# Patient Record
Sex: FEMALE | Race: WHITE | NOT HISPANIC OR LATINO | Employment: OTHER | URBAN - METROPOLITAN AREA
[De-identification: names, ages, dates, MRNs, and addresses within clinical notes are randomized per-mention and may not be internally consistent; named-entity substitution may affect disease eponyms.]

---

## 2017-02-23 ENCOUNTER — HOSPITAL ENCOUNTER (OUTPATIENT)
Dept: RADIOLOGY | Facility: MEDICAL CENTER | Age: 67
End: 2017-02-23
Attending: NEUROLOGICAL SURGERY
Payer: MEDICARE

## 2017-02-23 ENCOUNTER — HOSPITAL ENCOUNTER (OUTPATIENT)
Dept: RADIOLOGY | Facility: MEDICAL CENTER | Age: 67
DRG: 460 | End: 2017-02-23
Attending: NEUROLOGICAL SURGERY
Payer: MEDICARE

## 2017-02-23 DIAGNOSIS — M48.061 SPINAL STENOSIS, LUMBAR REGION, WITHOUT NEUROGENIC CLAUDICATION: ICD-10-CM

## 2017-02-23 DIAGNOSIS — Z01.812 PRE-OPERATIVE LABORATORY EXAMINATION: ICD-10-CM

## 2017-02-23 LAB
25(OH)D3 SERPL-MCNC: 29 NG/ML (ref 30–100)
ANION GAP SERPL CALC-SCNC: 10 MMOL/L (ref 0–11.9)
APPEARANCE UR: CLEAR
APTT PPP: 31.6 SEC (ref 24.7–36)
BACTERIA #/AREA URNS HPF: ABNORMAL /HPF
BASOPHILS # BLD AUTO: 0.5 % (ref 0–1.8)
BASOPHILS # BLD: 0.02 K/UL (ref 0–0.12)
BILIRUB UR QL STRIP.AUTO: NEGATIVE
BUN SERPL-MCNC: 25 MG/DL (ref 8–22)
CALCIUM SERPL-MCNC: 10.8 MG/DL (ref 8.5–10.5)
CHLORIDE SERPL-SCNC: 104 MMOL/L (ref 96–112)
CO2 SERPL-SCNC: 24 MMOL/L (ref 20–33)
COLOR UR: ABNORMAL
CREAT SERPL-MCNC: 0.93 MG/DL (ref 0.5–1.4)
CULTURE IF INDICATED INDCX: YES UA CULTURE
EOSINOPHIL # BLD AUTO: 0.1 K/UL (ref 0–0.51)
EOSINOPHIL NFR BLD: 2.4 % (ref 0–6.9)
ERYTHROCYTE [DISTWIDTH] IN BLOOD BY AUTOMATED COUNT: 41.8 FL (ref 35.9–50)
GFR SERPL CREATININE-BSD FRML MDRD: >60 ML/MIN/1.73 M 2
GLUCOSE SERPL-MCNC: 94 MG/DL (ref 65–99)
GLUCOSE UR STRIP.AUTO-MCNC: NEGATIVE MG/DL
HCT VFR BLD AUTO: 38.8 % (ref 37–47)
HGB BLD-MCNC: 13.2 G/DL (ref 12–16)
IMM GRANULOCYTES # BLD AUTO: 0.01 K/UL (ref 0–0.11)
IMM GRANULOCYTES NFR BLD AUTO: 0.2 % (ref 0–0.9)
INR PPP: 1.11 (ref 0.87–1.13)
KETONES UR STRIP.AUTO-MCNC: NEGATIVE MG/DL
LEUKOCYTE ESTERASE UR QL STRIP.AUTO: ABNORMAL
LYMPHOCYTES # BLD AUTO: 1.29 K/UL (ref 1–4.8)
LYMPHOCYTES NFR BLD: 30.7 % (ref 22–41)
MCH RBC QN AUTO: 31.3 PG (ref 27–33)
MCHC RBC AUTO-ENTMCNC: 34 G/DL (ref 33.6–35)
MCV RBC AUTO: 91.9 FL (ref 81.4–97.8)
MICRO URNS: ABNORMAL
MONOCYTES # BLD AUTO: 0.19 K/UL (ref 0–0.85)
MONOCYTES NFR BLD AUTO: 4.5 % (ref 0–13.4)
MUCOUS THREADS #/AREA URNS HPF: ABNORMAL /HPF
NEUTROPHILS # BLD AUTO: 2.59 K/UL (ref 2–7.15)
NEUTROPHILS NFR BLD: 61.7 % (ref 44–72)
NITRITE UR QL STRIP.AUTO: NEGATIVE
NRBC # BLD AUTO: 0 K/UL
NRBC BLD AUTO-RTO: 0 /100 WBC
PH UR STRIP.AUTO: 7 [PH]
PLATELET # BLD AUTO: 101 K/UL (ref 164–446)
PMV BLD AUTO: 9.9 FL (ref 9–12.9)
POTASSIUM SERPL-SCNC: 3.9 MMOL/L (ref 3.6–5.5)
PROT UR QL STRIP: NEGATIVE MG/DL
PROTHROMBIN TIME: 14.7 SEC (ref 12–14.6)
RBC # BLD AUTO: 4.22 M/UL (ref 4.2–5.4)
RBC # URNS HPF: ABNORMAL /HPF
RBC UR QL AUTO: NEGATIVE
SODIUM SERPL-SCNC: 138 MMOL/L (ref 135–145)
SP GR UR STRIP.AUTO: 1.02
WBC # BLD AUTO: 4.2 K/UL (ref 4.8–10.8)
WBC #/AREA URNS HPF: ABNORMAL /HPF

## 2017-02-23 PROCEDURE — 72110 X-RAY EXAM L-2 SPINE 4/>VWS: CPT

## 2017-02-23 PROCEDURE — 81001 URINALYSIS AUTO W/SCOPE: CPT

## 2017-02-23 PROCEDURE — 82306 VITAMIN D 25 HYDROXY: CPT

## 2017-02-23 PROCEDURE — 85610 PROTHROMBIN TIME: CPT

## 2017-02-23 PROCEDURE — 87086 URINE CULTURE/COLONY COUNT: CPT

## 2017-02-23 PROCEDURE — 87077 CULTURE AEROBIC IDENTIFY: CPT

## 2017-02-23 PROCEDURE — 87186 SC STD MICRODIL/AGAR DIL: CPT

## 2017-02-23 PROCEDURE — 71010 DX-CHEST-LIMITED (1 VIEW): CPT

## 2017-02-23 PROCEDURE — 85730 THROMBOPLASTIN TIME PARTIAL: CPT

## 2017-02-23 PROCEDURE — 36415 COLL VENOUS BLD VENIPUNCTURE: CPT

## 2017-02-23 PROCEDURE — 85025 COMPLETE CBC W/AUTO DIFF WBC: CPT

## 2017-02-23 PROCEDURE — 80048 BASIC METABOLIC PNL TOTAL CA: CPT

## 2017-02-23 RX ORDER — FLUOXETINE 10 MG/1
10 CAPSULE ORAL DAILY
Status: ON HOLD | COMMUNITY
End: 2019-12-19

## 2017-02-23 RX ORDER — NICOTINE POLACRILEX 4 MG/1
1 GUM, CHEWING ORAL DAILY
Status: ON HOLD | COMMUNITY
End: 2019-12-05

## 2017-02-23 RX ORDER — ALLOPURINOL 100 MG/1
100 TABLET ORAL DAILY
Status: ON HOLD | COMMUNITY
End: 2019-12-19

## 2017-02-24 LAB — EKG IMPRESSION: NORMAL

## 2017-02-25 LAB
BACTERIA UR CULT: ABNORMAL
BACTERIA UR CULT: ABNORMAL
SIGNIFICANT IND 70042: ABNORMAL
SITE SITE: ABNORMAL
SOURCE SOURCE: ABNORMAL

## 2017-03-08 ENCOUNTER — HOSPITAL ENCOUNTER (INPATIENT)
Facility: MEDICAL CENTER | Age: 67
LOS: 3 days | DRG: 460 | End: 2017-03-11
Attending: NEUROLOGICAL SURGERY | Admitting: NEUROLOGICAL SURGERY
Payer: MEDICARE

## 2017-03-08 ENCOUNTER — APPOINTMENT (OUTPATIENT)
Dept: RADIOLOGY | Facility: MEDICAL CENTER | Age: 67
DRG: 460 | End: 2017-03-08
Attending: NEUROLOGICAL SURGERY
Payer: MEDICARE

## 2017-03-08 PROBLEM — M48.061 SPINAL STENOSIS, LUMBAR REGION, WITHOUT NEUROGENIC CLAUDICATION: Status: ACTIVE | Noted: 2017-03-08

## 2017-03-08 LAB
ABO GROUP BLD: NORMAL
ABO GROUP BLD: NORMAL
BLD GP AB SCN SERPL QL: NORMAL
ERYTHROCYTE [DISTWIDTH] IN BLOOD BY AUTOMATED COUNT: 42.2 FL (ref 35.9–50)
GLUCOSE BLD-MCNC: 115 MG/DL (ref 65–99)
GLUCOSE BLD-MCNC: 117 MG/DL (ref 65–99)
HCT VFR BLD AUTO: 36.6 % (ref 37–47)
HGB BLD-MCNC: 12.3 G/DL (ref 12–16)
MCH RBC QN AUTO: 30.9 PG (ref 27–33)
MCHC RBC AUTO-ENTMCNC: 33.6 G/DL (ref 33.6–35)
MCV RBC AUTO: 92 FL (ref 81.4–97.8)
PLATELET # BLD AUTO: 90 K/UL (ref 164–446)
PMV BLD AUTO: 10.1 FL (ref 9–12.9)
RBC # BLD AUTO: 3.98 M/UL (ref 4.2–5.4)
RH BLD: NORMAL
WBC # BLD AUTO: 3.8 K/UL (ref 4.8–10.8)

## 2017-03-08 PROCEDURE — 500819 HCHG MARKERS, PASSIVE REFLECTIVE: Performed by: NEUROLOGICAL SURGERY

## 2017-03-08 PROCEDURE — 700111 HCHG RX REV CODE 636 W/ 250 OVERRIDE (IP): Performed by: PHYSICIAN ASSISTANT

## 2017-03-08 PROCEDURE — 501838 HCHG SUTURE GENERAL: Performed by: NEUROLOGICAL SURGERY

## 2017-03-08 PROCEDURE — C1713 ANCHOR/SCREW BN/BN,TIS/BN: HCPCS | Performed by: NEUROLOGICAL SURGERY

## 2017-03-08 PROCEDURE — A9270 NON-COVERED ITEM OR SERVICE: HCPCS | Performed by: PHYSICIAN ASSISTANT

## 2017-03-08 PROCEDURE — 700102 HCHG RX REV CODE 250 W/ 637 OVERRIDE(OP)

## 2017-03-08 PROCEDURE — 770001 HCHG ROOM/CARE - MED/SURG/GYN PRIV*

## 2017-03-08 PROCEDURE — 86850 RBC ANTIBODY SCREEN: CPT

## 2017-03-08 PROCEDURE — 503195 HCHG SEALER, BIPOLAR AQUAMANTYS: Performed by: NEUROLOGICAL SURGERY

## 2017-03-08 PROCEDURE — 700111 HCHG RX REV CODE 636 W/ 250 OVERRIDE (IP)

## 2017-03-08 PROCEDURE — 01NB0ZZ RELEASE LUMBAR NERVE, OPEN APPROACH: ICD-10-PCS | Performed by: NEUROLOGICAL SURGERY

## 2017-03-08 PROCEDURE — 86901 BLOOD TYPING SEROLOGIC RH(D): CPT

## 2017-03-08 PROCEDURE — A9270 NON-COVERED ITEM OR SERVICE: HCPCS

## 2017-03-08 PROCEDURE — 160035 HCHG PACU - 1ST 60 MINS PHASE I: Performed by: NEUROLOGICAL SURGERY

## 2017-03-08 PROCEDURE — 82962 GLUCOSE BLOOD TEST: CPT

## 2017-03-08 PROCEDURE — 500105 HCHG BONE MILL BM200: Performed by: NEUROLOGICAL SURGERY

## 2017-03-08 PROCEDURE — 502240 HCHG MISC OR SUPPLY RC 0272: Performed by: NEUROLOGICAL SURGERY

## 2017-03-08 PROCEDURE — 700101 HCHG RX REV CODE 250

## 2017-03-08 PROCEDURE — 700102 HCHG RX REV CODE 250 W/ 637 OVERRIDE(OP): Performed by: PHYSICIAN ASSISTANT

## 2017-03-08 PROCEDURE — 86900 BLOOD TYPING SEROLOGIC ABO: CPT

## 2017-03-08 PROCEDURE — 160002 HCHG RECOVERY MINUTES (STAT): Performed by: NEUROLOGICAL SURGERY

## 2017-03-08 PROCEDURE — A4606 OXYGEN PROBE USED W OXIMETER: HCPCS | Performed by: NEUROLOGICAL SURGERY

## 2017-03-08 PROCEDURE — A4314 CATH W/DRAINAGE 2-WAY LATEX: HCPCS | Performed by: NEUROLOGICAL SURGERY

## 2017-03-08 PROCEDURE — 160041 HCHG SURGERY MINUTES - EA ADDL 1 MIN LEVEL 4: Performed by: NEUROLOGICAL SURGERY

## 2017-03-08 PROCEDURE — 160009 HCHG ANES TIME/MIN: Performed by: NEUROLOGICAL SURGERY

## 2017-03-08 PROCEDURE — 160029 HCHG SURGERY MINUTES - 1ST 30 MINS LEVEL 4: Performed by: NEUROLOGICAL SURGERY

## 2017-03-08 PROCEDURE — 160048 HCHG OR STATISTICAL LEVEL 1-5: Performed by: NEUROLOGICAL SURGERY

## 2017-03-08 PROCEDURE — 500367 HCHG DRAIN KIT, HEMOVAC: Performed by: NEUROLOGICAL SURGERY

## 2017-03-08 PROCEDURE — 110371 HCHG SHELL REV 272: Performed by: NEUROLOGICAL SURGERY

## 2017-03-08 PROCEDURE — 700111 HCHG RX REV CODE 636 W/ 250 OVERRIDE (IP): Performed by: NEUROLOGICAL SURGERY

## 2017-03-08 PROCEDURE — 500002 HCHG ADHESIVE, DERMABOND: Performed by: NEUROLOGICAL SURGERY

## 2017-03-08 PROCEDURE — A6222 GAUZE <=16 IN NO W/SAL W/O B: HCPCS | Performed by: NEUROLOGICAL SURGERY

## 2017-03-08 PROCEDURE — 72100 X-RAY EXAM L-S SPINE 2/3 VWS: CPT

## 2017-03-08 PROCEDURE — 0QH004Z INSERTION OF INTERNAL FIXATION DEVICE INTO LUMBAR VERTEBRA, OPEN APPROACH: ICD-10-PCS | Performed by: NEUROLOGICAL SURGERY

## 2017-03-08 PROCEDURE — 0SG00A1 FUSION OF LUMBAR VERTEBRAL JOINT WITH INTERBODY FUSION DEVICE, POSTERIOR APPROACH, POSTERIOR COLUMN, OPEN APPROACH: ICD-10-PCS | Performed by: NEUROLOGICAL SURGERY

## 2017-03-08 PROCEDURE — 160036 HCHG PACU - EA ADDL 30 MINS PHASE I: Performed by: NEUROLOGICAL SURGERY

## 2017-03-08 PROCEDURE — 502626 HCHG SURGIFLO HEMOSTATIC MATRIX 6ML: Performed by: NEUROLOGICAL SURGERY

## 2017-03-08 PROCEDURE — 502708 HCHG CATHETER, ON-Q SILVER SKR: Performed by: NEUROLOGICAL SURGERY

## 2017-03-08 PROCEDURE — 95940 IONM IN OPERATNG ROOM 15 MIN: CPT | Performed by: NEUROLOGICAL SURGERY

## 2017-03-08 PROCEDURE — 502000 HCHG MISC OR IMPLANTS RC 0278: Performed by: NEUROLOGICAL SURGERY

## 2017-03-08 PROCEDURE — 700112 HCHG RX REV CODE 229: Performed by: PHYSICIAN ASSISTANT

## 2017-03-08 PROCEDURE — 110382 HCHG SHELL REV 271: Performed by: NEUROLOGICAL SURGERY

## 2017-03-08 PROCEDURE — 85027 COMPLETE CBC AUTOMATED: CPT

## 2017-03-08 DEVICE — DBM CHUNKY PROGENIX PLS 10.CC: Type: IMPLANTABLE DEVICE | Status: FUNCTIONAL

## 2017-03-08 DEVICE — IMPLANTABLE DEVICE: Type: IMPLANTABLE DEVICE | Status: FUNCTIONAL

## 2017-03-08 RX ORDER — CEFAZOLIN SODIUM 2 G/100ML
2 INJECTION, SOLUTION INTRAVENOUS EVERY 8 HOURS
Status: COMPLETED | OUTPATIENT
Start: 2017-03-08 | End: 2017-03-09

## 2017-03-08 RX ORDER — ONDANSETRON 4 MG/1
4 TABLET, ORALLY DISINTEGRATING ORAL EVERY 4 HOURS PRN
Status: DISCONTINUED | OUTPATIENT
Start: 2017-03-08 | End: 2017-03-11 | Stop reason: HOSPADM

## 2017-03-08 RX ORDER — SODIUM CHLORIDE AND POTASSIUM CHLORIDE 150; 900 MG/100ML; MG/100ML
INJECTION, SOLUTION INTRAVENOUS CONTINUOUS
Status: DISCONTINUED | OUTPATIENT
Start: 2017-03-08 | End: 2017-03-11 | Stop reason: HOSPADM

## 2017-03-08 RX ORDER — METHOCARBAMOL 750 MG/1
750 TABLET, FILM COATED ORAL EVERY 8 HOURS PRN
Status: DISCONTINUED | OUTPATIENT
Start: 2017-03-08 | End: 2017-03-11 | Stop reason: HOSPADM

## 2017-03-08 RX ORDER — OXYCODONE HCL 5 MG/5 ML
SOLUTION, ORAL ORAL
Status: COMPLETED
Start: 2017-03-08 | End: 2017-03-08

## 2017-03-08 RX ORDER — ROPIVACAINE HYDROCHLORIDE 2 MG/ML
INJECTION, SOLUTION EPIDURAL; INFILTRATION; PERINEURAL
Status: DISCONTINUED | OUTPATIENT
Start: 2017-03-08 | End: 2017-03-08 | Stop reason: HOSPADM

## 2017-03-08 RX ORDER — LISINOPRIL 20 MG/1
20 TABLET ORAL DAILY
Status: DISCONTINUED | OUTPATIENT
Start: 2017-03-08 | End: 2017-03-11 | Stop reason: HOSPADM

## 2017-03-08 RX ORDER — BISACODYL 10 MG
10 SUPPOSITORY, RECTAL RECTAL
Status: DISCONTINUED | OUTPATIENT
Start: 2017-03-08 | End: 2017-03-11 | Stop reason: HOSPADM

## 2017-03-08 RX ORDER — HYDROCHLOROTHIAZIDE 25 MG/1
50 TABLET ORAL DAILY
Status: DISCONTINUED | OUTPATIENT
Start: 2017-03-08 | End: 2017-03-11 | Stop reason: HOSPADM

## 2017-03-08 RX ORDER — DEXAMETHASONE SODIUM PHOSPHATE 4 MG/ML
4 INJECTION, SOLUTION INTRA-ARTICULAR; INTRALESIONAL; INTRAMUSCULAR; INTRAVENOUS; SOFT TISSUE EVERY 6 HOURS
Status: COMPLETED | OUTPATIENT
Start: 2017-03-08 | End: 2017-03-09

## 2017-03-08 RX ORDER — DIPHENHYDRAMINE HCL 25 MG
25 TABLET ORAL EVERY 6 HOURS PRN
Status: DISCONTINUED | OUTPATIENT
Start: 2017-03-08 | End: 2017-03-11 | Stop reason: HOSPADM

## 2017-03-08 RX ORDER — DOCUSATE SODIUM 100 MG/1
100 CAPSULE, LIQUID FILLED ORAL 2 TIMES DAILY
Status: DISCONTINUED | OUTPATIENT
Start: 2017-03-08 | End: 2017-03-11 | Stop reason: HOSPADM

## 2017-03-08 RX ORDER — SODIUM CHLORIDE, SODIUM LACTATE, POTASSIUM CHLORIDE, CALCIUM CHLORIDE 600; 310; 30; 20 MG/100ML; MG/100ML; MG/100ML; MG/100ML
1000 INJECTION, SOLUTION INTRAVENOUS
Status: COMPLETED | OUTPATIENT
Start: 2017-03-08 | End: 2017-03-08

## 2017-03-08 RX ORDER — GLIMEPIRIDE 2 MG/1
1 TABLET ORAL EVERY MORNING
Status: DISCONTINUED | OUTPATIENT
Start: 2017-03-08 | End: 2017-03-11 | Stop reason: HOSPADM

## 2017-03-08 RX ORDER — VANCOMYCIN HCL 900 MCG/MG
POWDER (GRAM) MISCELLANEOUS
Status: DISCONTINUED | OUTPATIENT
Start: 2017-03-08 | End: 2017-03-08 | Stop reason: HOSPADM

## 2017-03-08 RX ORDER — ZOLPIDEM TARTRATE 5 MG/1
5 TABLET ORAL
Status: DISCONTINUED | OUTPATIENT
Start: 2017-03-09 | End: 2017-03-11 | Stop reason: HOSPADM

## 2017-03-08 RX ORDER — MAGNESIUM HYDROXIDE 1200 MG/15ML
LIQUID ORAL
Status: DISCONTINUED | OUTPATIENT
Start: 2017-03-08 | End: 2017-03-08 | Stop reason: HOSPADM

## 2017-03-08 RX ORDER — ATORVASTATIN CALCIUM 20 MG/1
20 TABLET, FILM COATED ORAL DAILY
COMMUNITY

## 2017-03-08 RX ORDER — LIDOCAINE HYDROCHLORIDE 10 MG/ML
INJECTION, SOLUTION INFILTRATION; PERINEURAL
Status: COMPLETED
Start: 2017-03-08 | End: 2017-03-08

## 2017-03-08 RX ORDER — ACETAMINOPHEN 500 MG
TABLET ORAL
Status: DISPENSED
Start: 2017-03-08 | End: 2017-03-09

## 2017-03-08 RX ORDER — ONDANSETRON 2 MG/ML
4 INJECTION INTRAMUSCULAR; INTRAVENOUS EVERY 4 HOURS PRN
Status: DISCONTINUED | OUTPATIENT
Start: 2017-03-08 | End: 2017-03-11 | Stop reason: HOSPADM

## 2017-03-08 RX ORDER — CELECOXIB 200 MG/1
CAPSULE ORAL
Status: DISPENSED
Start: 2017-03-08 | End: 2017-03-09

## 2017-03-08 RX ORDER — SODIUM CHLORIDE, SODIUM LACTATE, POTASSIUM CHLORIDE, AND CALCIUM CHLORIDE .6; .31; .03; .02 G/100ML; G/100ML; G/100ML; G/100ML
IRRIGANT IRRIGATION
Status: DISCONTINUED | OUTPATIENT
Start: 2017-03-08 | End: 2017-03-08 | Stop reason: HOSPADM

## 2017-03-08 RX ORDER — HYDROMORPHONE HYDROCHLORIDE 2 MG/ML
INJECTION, SOLUTION INTRAMUSCULAR; INTRAVENOUS; SUBCUTANEOUS
Status: COMPLETED
Start: 2017-03-08 | End: 2017-03-08

## 2017-03-08 RX ORDER — AMOXICILLIN 250 MG
1 CAPSULE ORAL NIGHTLY
Status: DISCONTINUED | OUTPATIENT
Start: 2017-03-08 | End: 2017-03-11 | Stop reason: HOSPADM

## 2017-03-08 RX ORDER — MEPERIDINE HYDROCHLORIDE 25 MG/ML
INJECTION INTRAMUSCULAR; INTRAVENOUS; SUBCUTANEOUS
Status: COMPLETED
Start: 2017-03-08 | End: 2017-03-08

## 2017-03-08 RX ORDER — ALLOPURINOL 100 MG/1
100 TABLET ORAL DAILY
Status: DISCONTINUED | OUTPATIENT
Start: 2017-03-08 | End: 2017-03-11 | Stop reason: HOSPADM

## 2017-03-08 RX ORDER — AMOXICILLIN 250 MG
1 CAPSULE ORAL
Status: DISCONTINUED | OUTPATIENT
Start: 2017-03-08 | End: 2017-03-11 | Stop reason: HOSPADM

## 2017-03-08 RX ORDER — ATORVASTATIN CALCIUM 40 MG/1
20 TABLET, FILM COATED ORAL DAILY
Status: DISCONTINUED | OUTPATIENT
Start: 2017-03-08 | End: 2017-03-11 | Stop reason: HOSPADM

## 2017-03-08 RX ORDER — POLYETHYLENE GLYCOL 3350 17 G/17G
1 POWDER, FOR SOLUTION ORAL 2 TIMES DAILY PRN
Status: DISCONTINUED | OUTPATIENT
Start: 2017-03-08 | End: 2017-03-11 | Stop reason: HOSPADM

## 2017-03-08 RX ORDER — FLUOXETINE 10 MG/1
10 CAPSULE ORAL DAILY
Status: DISCONTINUED | OUTPATIENT
Start: 2017-03-08 | End: 2017-03-11 | Stop reason: HOSPADM

## 2017-03-08 RX ORDER — BUPIVACAINE HYDROCHLORIDE AND EPINEPHRINE 5; 5 MG/ML; UG/ML
INJECTION, SOLUTION EPIDURAL; INTRACAUDAL; PERINEURAL
Status: DISCONTINUED | OUTPATIENT
Start: 2017-03-08 | End: 2017-03-08 | Stop reason: HOSPADM

## 2017-03-08 RX ORDER — GABAPENTIN 300 MG/1
CAPSULE ORAL
Status: DISPENSED
Start: 2017-03-08 | End: 2017-03-09

## 2017-03-08 RX ORDER — ENEMA 19; 7 G/133ML; G/133ML
1 ENEMA RECTAL
Status: DISCONTINUED | OUTPATIENT
Start: 2017-03-08 | End: 2017-03-11 | Stop reason: HOSPADM

## 2017-03-08 RX ORDER — DIPHENHYDRAMINE HYDROCHLORIDE 50 MG/ML
25 INJECTION INTRAMUSCULAR; INTRAVENOUS EVERY 6 HOURS PRN
Status: DISCONTINUED | OUTPATIENT
Start: 2017-03-08 | End: 2017-03-11 | Stop reason: HOSPADM

## 2017-03-08 RX ORDER — LABETALOL HYDROCHLORIDE 5 MG/ML
10 INJECTION, SOLUTION INTRAVENOUS
Status: DISCONTINUED | OUTPATIENT
Start: 2017-03-08 | End: 2017-03-11 | Stop reason: HOSPADM

## 2017-03-08 RX ADMIN — DEXAMETHASONE SODIUM PHOSPHATE 4 MG: 4 INJECTION, SOLUTION INTRAMUSCULAR; INTRAVENOUS at 19:28

## 2017-03-08 RX ADMIN — DEXAMETHASONE SODIUM PHOSPHATE 4 MG: 4 INJECTION, SOLUTION INTRAMUSCULAR; INTRAVENOUS at 23:31

## 2017-03-08 RX ADMIN — STANDARDIZED SENNA CONCENTRATE AND DOCUSATE SODIUM 1 TABLET: 8.6; 5 TABLET, FILM COATED ORAL at 19:28

## 2017-03-08 RX ADMIN — OXYCODONE HYDROCHLORIDE 10 MG: 5 SOLUTION ORAL at 16:55

## 2017-03-08 RX ADMIN — HYDROMORPHONE HYDROCHLORIDE 0.5 MG: 2 INJECTION INTRAMUSCULAR; INTRAVENOUS; SUBCUTANEOUS at 17:06

## 2017-03-08 RX ADMIN — CEFAZOLIN SODIUM 2 G: 2 INJECTION, SOLUTION INTRAVENOUS at 19:28

## 2017-03-08 RX ADMIN — FENTANYL CITRATE 25 MCG: 50 INJECTION, SOLUTION INTRAMUSCULAR; INTRAVENOUS at 16:50

## 2017-03-08 RX ADMIN — FENTANYL CITRATE 25 MCG: 50 INJECTION, SOLUTION INTRAMUSCULAR; INTRAVENOUS at 17:19

## 2017-03-08 RX ADMIN — SODIUM CHLORIDE, SODIUM LACTATE, POTASSIUM CHLORIDE, CALCIUM CHLORIDE 1000 ML: 600; 310; 30; 20 INJECTION, SOLUTION INTRAVENOUS at 13:45

## 2017-03-08 RX ADMIN — HYDROMORPHONE HYDROCHLORIDE: 2 INJECTION INTRAMUSCULAR; INTRAVENOUS; SUBCUTANEOUS at 18:15

## 2017-03-08 RX ADMIN — FENTANYL CITRATE 25 MCG: 50 INJECTION, SOLUTION INTRAMUSCULAR; INTRAVENOUS at 16:59

## 2017-03-08 RX ADMIN — FENTANYL CITRATE 25 MCG: 50 INJECTION, SOLUTION INTRAMUSCULAR; INTRAVENOUS at 16:42

## 2017-03-08 RX ADMIN — DOCUSATE SODIUM 100 MG: 100 CAPSULE ORAL at 19:28

## 2017-03-08 RX ADMIN — MEPERIDINE HYDROCHLORIDE 25 MG: 25 INJECTION INTRAMUSCULAR; INTRAVENOUS; SUBCUTANEOUS at 17:16

## 2017-03-08 RX ADMIN — HYDROMORPHONE HYDROCHLORIDE 0.5 MG: 2 INJECTION INTRAMUSCULAR; INTRAVENOUS; SUBCUTANEOUS at 16:43

## 2017-03-08 RX ADMIN — LIDOCAINE HYDROCHLORIDE: 10 INJECTION, SOLUTION INFILTRATION; PERINEURAL at 13:00

## 2017-03-08 RX ADMIN — HYDROMORPHONE HYDROCHLORIDE 0.5 MG: 2 INJECTION INTRAMUSCULAR; INTRAVENOUS; SUBCUTANEOUS at 17:29

## 2017-03-08 RX ADMIN — HYDROMORPHONE HYDROCHLORIDE 0.5 MG: 2 INJECTION INTRAMUSCULAR; INTRAVENOUS; SUBCUTANEOUS at 16:54

## 2017-03-08 ASSESSMENT — PAIN SCALES - GENERAL
PAINLEVEL_OUTOF10: ASSUMED PAIN PRESENT
PAINLEVEL_OUTOF10: 6
PAINLEVEL_OUTOF10: 9
PAINLEVEL_OUTOF10: 8
PAINLEVEL_OUTOF10: 5
PAINLEVEL_OUTOF10: 8
PAINLEVEL_OUTOF10: 5
PAINLEVEL_OUTOF10: 5

## 2017-03-08 ASSESSMENT — LIFESTYLE VARIABLES
EVER_SMOKED: YES
ALCOHOL_USE: NO

## 2017-03-08 NOTE — IP AVS SNAPSHOT
3/11/2017          Olga Lockhart  [address]  [facility] [address]    Dear Olga:    [facility] wants to ensure your discharge home is safe and you or your loved ones have had all your questions answered regarding your care after you leave the hospital.    You may receive a telephone call within two days of your discharge.  This call is to make certain you understand your discharge instructions as well as ensure we provided you with the best care possible during your stay with us.     The call will only last approximately 3-5 minutes and will be done by a nurse.    Once again, we want to ensure your discharge home is safe and that you have a clear understanding of any next steps in your care.  If you have any questions or concerns, please do not hesitate to contact us, we are here for you.  Thank you for choosing [facility] for your healthcare needs.    Sincerely,    Rehan Wallace    [facility]

## 2017-03-08 NOTE — IP AVS SNAPSHOT
" <p align=\"LEFT\"><IMG SRC=\"[email or web address]\" alt=\"Image\" WIDTH=\"50%\" HEIGHT=\"200\" BORDER=\"\"></p>                   Name:Olga Lockhart  Medical Record Number:[id number]  CSN: [id number]    YOB: 1950   Age: 66 y.o.  Sex: female  HT:1.651 m (5' 5\") WT: 93.1 kg (205 lb 4 oz)          Admit Date: 3/8/2017     Discharge Date:   Today's Date: 3/11/2017  Attending Doctor:  Yamilet Wild M.D.                  Allergies:  Morphine          Follow-up Information     1. Follow up with Yamilet Wild M.D..    Specialty:  Neurosurgery    Why:  As needed    Contact information    [id number]  [place] [address]  [phone]           Medication List      Take these Medications        Instructions    allopurinol 100 MG Tabs   Commonly known as:  ZYLOPRIM    Take 100 mg by mouth every day.   Dose:  100 mg       atorvastatin 20 MG Tabs   Commonly known as:  LIPITOR    Take 20 mg by mouth every day.   Dose:  20 mg       cephALEXin 500 MG Caps   Commonly known as:  KEFLEX    Take 1 Cap by mouth 4 times a day.   Dose:  500 mg       fluoxetine 10 MG Caps   Commonly known as:  PROZAC    Take 10 mg by mouth every day.   Dose:  10 mg       glimepiride 1 MG tablet   Commonly known as:  AMARYL    Take 1 mg by mouth every morning.   Dose:  1 mg       hydrochlorothiazide 50 MG Tabs   Commonly known as:  HYDRODIURIL    Take 50 mg by mouth every day.   Dose:  50 mg       lisinopril 20 MG Tabs   Commonly known as:  PRINIVIL    Take 20 mg by mouth every day.   Dose:  20 mg       metformin 1000 MG tablet   Commonly known as:  GLUCOPHAGE    Take 1,000 mg by mouth 2 times a day, with meals.   Dose:  1000 mg       omeprazole 20 MG Tbec delayed-release tablet   Commonly known as:  PRILOSEC    Take 1 Tab by mouth every day.   Dose:  1 Tab       ondansetron 4 MG Tabs tablet   Commonly known as:  ZOFRAN    Take 1-2 Tabs by mouth every 6 hours as needed for Nausea/Vomiting.   Dose:  4-8 mg       oxycodone-acetaminophen 5-325 MG " Tabs   Commonly known as:  PERCOCET    Take 1-2 Tabs by mouth every four hours as needed for Severe Pain.   Dose:  1-2 Tab       tizanidine 4 MG Tabs   Commonly known as:  ZANAFLEX    Take 1 Tab by mouth every 6 hours as needed.   Dose:  4 mg       vitamin D 1000 UNIT Tabs   Commonly known as:  cholecalciferol    Take 1,000 Units by mouth every day.   Dose:  1000 Units       VITAMIN E PO    Take 1 Tab by mouth every day.   Dose:  1 Tab

## 2017-03-08 NOTE — IP AVS SNAPSHOT
" Home Care Instructions                                                                                                                  Name:Olga Lockhart  Medical Record Number:[id number]  CSN: [id number]    YOB: 1950   Age: 66 y.o.  Sex: female  HT:1.651 m (5' 5\") WT: 93.1 kg (205 lb 4 oz)          Admit Date: 3/8/2017     Discharge Date:   Today's Date: 3/11/2017  Attending Doctor:  Yamilet Wild M.D.                  Allergies:  Morphine            Discharge Instructions       Discharge Instructions    Discharged to home by car with relative. Discharged via wheelchair, hospital escort: Yes.  Special equipment needed: LSO    Be sure to schedule a follow-up appointment with your primary care doctor or any specialists as instructed.     Discharge Plan:   Diet Plan: Discussed  Activity Level: Discussed  Confirmed Follow up Appointment: Patient to Call and Schedule Appointment  Confirmed Symptoms Management: Discussed  Medication Reconciliation Updated: Yes  Influenza Vaccine Indication: Not indicated: Previously immunized this influenza season and > 8 years of age    I understand that a diet low in cholesterol, fat, and sodium is recommended for good health. Unless I have been given specific instructions below for another diet, I accept this instruction as my diet prescription.   Other diet: Diabetic     Special Instructions: None    · Is patient discharged on Warfarin / Coumadin?   No     · Is patient Post Blood Transfusion?  No    Spinal Fusion  Spinal fusion is a procedure to make 2 or more of the bones in your spinal column (vertebrae) grow together (fuse). This procedure stops movement between the vertebrae and can relieve pain and prevent deformity.   Spinal fusion is used to treat the following conditions:  · Fractures of the spine.  · Herniated disk (the spongy material [cartilage] between the vertebrae).  · Abnormal curvatures of the spine, such as scoliosis or kyphosis.  · A weak or an unstable " spine, caused by infections or tumor.  RISKS AND COMPLICATIONS  Complications associated with spinal fusion are rare, but they can occur. Possible complications include:  · Bleeding.  · Infection near the incision.  · Nerve damage. Signs of nerve damage are back pain, pain in one or both legs, weakness, or numbness.  · Spinal fluid leakage.  · Blood clot in your leg, which can move to your lungs.  · Difficulty controlling urination or bowel movements.  BEFORE THE PROCEDURE  · A medical evaluation will be done. This will include a physical exam, blood tests, and imaging exams.  · You will talk with an anesthesiologist. This is the person who will be in charge of the anesthesia during the procedure. Spinal fusion usually requires that you are asleep during the procedure (general anesthesia).  · You will need to stop taking certain medicines, particularly those associated with an increased risk of bleeding. Ask your caregiver about changing or stopping your regular medicines.  · If you smoke, you will need to stop at least 2 weeks before the procedure. Smoking can slow down the healing process, especially fusion of the vertebrae, and increase the risk of complications.  · Do not eat or drink anything for at least 8 hours before the procedure.  PROCEDURE   A cut (incision) is made over the vertebrae that will be fused. The back muscles are  from the vertebrae. If you are having this procedure to treat a herniated disk, the disc material pressing on the nerve root is removed (decompression). The area where the disk is removed is then filled with extra bone. Bone from another part of your body (autogenous bone) or bone from a bone donor (allograft bone) may be used. The extra bone promotes fusion between the vertebrae. Sometimes, specific medicines are added to the fusion area to promote bone healing. In most cases, screws and rods or metal plates will be used to attach the vertebrae to stabilize them while they  "fuse.   AFTER THE PROCEDURE   · You will stay in a recovery area until the anesthesia has worn off. Your blood pressure and pulse will be checked frequently.  · You will be given antibiotics to prevent infection.  · You may continue to receive fluids through an intravenous (IV) tube while you are still in the hospital.  · Pain after surgery is normal. You will be given pain medicine.  · You will be taught how to move correctly and how to stand and walk. While in bed, you will be instructed to turn frequently, using a \"log rolling\" technique, in which the entire body is moved without twisting the back.     This information is not intended to replace advice given to you by your health care provider. Make sure you discuss any questions you have with your health care provider.     Document Released: 09/15/2004 Document Revised: 03/11/2013 Document Reviewed: 03/01/2012  [employer] Interactive Patient Education ©2016 [employer]    Spinal Fusion, Care After  Refer to this sheet in the next few weeks. These instructions provide you with information on caring for yourself after your procedure. Your caregiver may also give you more specific instructions. Your treatment has been planned according to current medical practices, but problems sometimes occur. Call your caregiver if you have any problems or questions after your procedure.  HOME CARE INSTRUCTIONS   · Take whatever pain medicine has been prescribed by your caregiver. Do not take over-the-counter pain medicine unless directed otherwise by your caregiver.  · Do not drive if you are taking narcotic pain medicines.  · Change your bandage (dressing) if necessary or as directed by your caregiver.  · Do not get your surgical cut (incision) wet. After a few days you may take quick showers (rather than baths), but keep your incision clean and dry. Covering the incision with plastic wrap while you shower should keep your incision dry. A few weeks after surgery, once your " incision has healed and your caregiver says it is okay, you can take baths or go swimming.  · If you have been prescribed medicine to prevent your blood from clotting, follow the directions carefully.  · Check the area around your incision often. Look for redness and swelling. Also, look for anything leaking from your wound. You can use a mirror or have a family member inspect your incision if it is in a place where it is difficult for you to see.  · Ask your caregiver what activities you should avoid and for how long.  · Walk as much as possible.  · Do not lift anything heavier than 10 pounds (4.5 kilograms) until your caregiver says it is safe.  · Do not twist or bend for a few weeks. Try not to pull on things. Avoid sitting for long periods of time. Change positions at least every hour.  · Ask your caregiver what kinds of exercise you should do to make your back stronger and when you should begin doing these exercises.  SEEK IMMEDIATE MEDICAL CARE IF:   · Pain suddenly becomes much worse.  · The incision area is red, swollen, bleeding, or leaking fluid.  · Your legs or feet become increasingly painful, numb, weak, or swollen.  · You have trouble controlling urination or bowel movements.  · You have trouble breathing.  · You have chest pain.  · You have a fever.  MAKE SURE YOU:  · Understand these instructions.  · Will watch your condition.  · Will get help right away if you are not doing well or get worse.     This information is not intended to replace advice given to you by your health care provider. Make sure you discuss any questions you have with your health care provider.     Document Released: 07/07/2006 Document Revised: 01/08/2016 Document Reviewed: 03/01/2012  [employer] Interactive Patient Education ©2016 [employer]      Lumbar Laminectomy, Care After  Refer to this sheet in the next few weeks. These instructions provide you with information on caring for yourself after your procedure. Your health  care provider may also give you more specific instructions. Your treatment has been planned according to current medical practices, but problems sometimes occur. Call your health care provider if you have any problems or questions after your procedure.  HOME CARE INSTRUCTIONS   · Check the incision twice a day for signs of infection. Some signs may include a foul-smelling, greenish or yellowish discharge from the wound, increased pain, or increased redness over the incision.  · Change your bandages about 24-36 hours after surgery, or as directed.  · You may shower once the bandage is removed, or as directed. Avoid tub baths, swimming, and hot tubs for 3 weeks or until your incision has healed completely. If you have stitches or staples, they may be removed 2-3 weeks after surgery, or as directed by your doctor.  · Daily exercise is helpful to prevent the return of problems. Walking is permitted. You may use a treadmill without an incline. Cut down on activities and exercise if you have discomfort. You may also go up and down stairs as much as you can tolerate.  · Do not lift anything heavier than 15 lb. Avoid bending or twisting at the waist. Always bend your knees.  · Maintain strength and range of motion as instructed.  · Do not drive for 2-3 weeks, or as directed by your doctor. You may be a passenger for 20-30 minutes at a time. Lying back in the passenger seat may be more comfortable for you.  · Limit your sitting to intervals of 20-30 minutes. You should lie down or walk in between sitting periods. There are no limitations for sitting in a recliner chair.  · Only take over-the-counter or prescription medicines for pain, discomfort, or fever as directed by your health care provider.  SEEK MEDICAL CARE IF:   · There is increased bleeding (more than a small spot) from the wound.  · You notice redness, swelling, or increasing pain in the wound.  · Pus is coming from the wound.  · You have a fever for more than 2-3  days.  · You notice a foul smell coming from the wound or dressing.  · You have increasing pain in your wound.  SEEK IMMEDIATE MEDICAL CARE IF:   · You develop a rash.  · You have difficulty breathing.  · You have any allergic problems.  · You develop a headache or stiff neck that does not respond to pain relievers.  · You are unable to urinate.  · You develop new onset of pain, numbness, or weakness in the buttocks or lower extremities.  MAKE SURE YOU:   · Understand these instructions.  · Will watch your condition.  · Will get help right away if you are not doing well or get worse.     This information is not intended to replace advice given to you by your health care provider. Make sure you discuss any questions you have with your health care provider.     Document Released: 11/21/2005 Document Revised: 08/20/2014 Document Reviewed: 05/15/2014  [employer] Interactive Patient Education ©2016 [employer]    Lumbar Diskectomy, Care After  Refer to this sheet in the next few weeks. These instructions provide you with information about caring for yourself after your procedure. Your health care provider may also give you more specific instructions. Your treatment has been planned according to current medical practices, but problems sometimes occur. Call your health care provider if you have any problems or questions after your procedure.  WHAT TO EXPECT AFTER THE PROCEDURE  After your procedure, it is common to have:  · Pain.  · Numbness.  · Weakness.  HOME CARE INSTRUCTIONS  Medicines  · Take medicines only as directed by your health care provider.  · If you were prescribed an antibiotic medicine, finish all of it even if you start to feel better.  Incision Care  · There are many different ways to close and cover an incision, including stitches (sutures), skin glue, and adhesive strips. Follow your health care provider's instructions about:  · Incision care.  · Bandage (dressing) changes and removal.  · Incision  closure removal.  · Check your incision area every day for signs of infection. If you cannot see your incision, have someone check it for you. Watch for:  · Redness, swelling, or pain.  · Fluid, blood, or pus.   Activities  · Avoid sitting for longer than 20 minutes at a time or as directed by your health care provider.  · Do not climb stairs more than once each day until your health care provider approves.  · Do not bend at your waist. To pick things up, bend your knees.  · Do not lift anything that is heavier than 10 lb (4.5 kg) or as directed by your health care provider.  · Do not drive a car until your health care provider approves.  · Ask your health care provider when you may return to your normal activities, such as playing sports and going back to work.  · Work with your physical therapist to learn safe movement and exercises to help healing. Do these exercises as directed.  · Take short walks often.  General Instructions  · Do not use any tobacco products, including cigarettes, chewing tobacco, or electronic cigarettes. If you need help quitting, ask your health care provider.  · Follow your health care provider's instructions about bathing. Do not take baths, shower, swim, or use a hot tub until your health care provider approves.  · Wear your back brace as directed by your health care provider.  · To prevent constipation:  ¨ Drink enough fluid to keep your urine clear or pale yellow.  ¨ Eat plenty of fruits, vegetables, and whole grains.  · Keep all follow-up visits as directed by your health care provider. This is important. This includes any follow-up visits with your physical therapist.  SEEK MEDICAL CARE IF:  · You have a fever.  · You have redness, swelling, or pain in your incision area.  · Your pain is not controlled with medicine.  · You have pain, numbness, or weakness that lasts longer than three weeks after surgery.  · You become constipated.  SEEK IMMEDIATE MEDICAL CARE IF:  · You have fluid,  blood, or pus coming from your incision.  · You have increasing pain, numbness, or weakness.  · You lose control of when you urinate or have a bowel movement (incontinence).  · You have chest pain.  · You have trouble breathing.     This information is not intended to replace advice given to you by your health care provider. Make sure you discuss any questions you have with your health care provider.     Document Released: 11/22/2005 Document Revised: 01/08/2016 Document Reviewed: 08/12/2015  [employer] Interactive Patient Education ©2016 [employer]       Depression / Suicide Risk    As you are discharged from this [facility] facility, it is important to learn how to keep safe from harming yourself.    Recognize the warning signs:  · Abrupt changes in personality, positive or negative- including increase in energy   · Giving away possessions  · Change in eating patterns- significant weight changes-  positive or negative  · Change in sleeping patterns- unable to sleep or sleeping all the time   · Unwillingness or inability to communicate  · Depression  · Unusual sadness, discouragement and loneliness  · Talk of wanting to die  · Neglect of personal appearance   · Rebelliousness- reckless behavior  · Withdrawal from people/activities they love  · Confusion- inability to concentrate     If you or a loved one observes any of these behaviors or has concerns about self-harm, here's what you can do:  · Talk about it- your feelings and reasons for harming yourself  · Remove any means that you might use to hurt yourself (examples: pills, rope, extension cords, firearm)  · Get professional help from the community (Mental Health, Substance Abuse, psychological counseling)  · Do not be alone:Call your Safe Contact- someone whom you trust who will be there for you.  · Call your local CRISIS HOTLINE 524-0811 or 154-861-9539  · Call your local Children's Mobile Crisis Response Team [place] (236) 320-5429 or  www.LiveV[employer].NSH [place]  · Call the toll free National Suicide Prevention Hotlines   · National Suicide Prevention Lifeline 662-573-DESG (0945)  · National Hope Line Network 800-SUICIDE (748-0725)        Follow-up Information     1. Follow up with Yamilet Wild M.D..    Specialty:  Neurosurgery    Why:  As needed    Contact information    [id number]  [place] [id number]  [phone]           Discharge Medication Instructions:    Below are the medications your physician expects you to take upon discharge:    Review all your home medications and newly ordered medications with your doctor and/or pharmacist. Follow medication instructions as directed by your doctor and/or pharmacist.    Please keep your medication list with you and share with your physician.               Medication List      START taking these medications        Instructions    cephALEXin 500 MG Caps   Commonly known as:  KEFLEX    Take 1 Cap by mouth 4 times a day.   Dose:  500 mg       ondansetron 4 MG Tabs tablet   Commonly known as:  ZOFRAN    Take 1-2 Tabs by mouth every 6 hours as needed for Nausea/Vomiting.   Dose:  4-8 mg       oxycodone-acetaminophen 5-325 MG Tabs   Last time this was given:  2 Tabs on 3/11/2017  8:12 AM   Commonly known as:  PERCOCET    Take 1-2 Tabs by mouth every four hours as needed for Severe Pain.   Dose:  1-2 Tab       tizanidine 4 MG Tabs   Commonly known as:  ZANAFLEX    Take 1 Tab by mouth every 6 hours as needed.   Dose:  4 mg         CONTINUE taking these medications        Instructions    allopurinol 100 MG Tabs   Last time this was given:  100 mg on 3/11/2017  8:11 AM   Commonly known as:  ZYLOPRIM    Take 100 mg by mouth every day.   Dose:  100 mg       atorvastatin 20 MG Tabs   Last time this was given:  20 mg on 3/11/2017  8:11 AM   Commonly known as:  LIPITOR    Take 20 mg by mouth every day.   Dose:  20 mg       fluoxetine 10 MG Caps   Last time this was given:  10 mg on 3/11/2017  8:10 AM   Commonly  known as:  PROZAC    Take 10 mg by mouth every day.   Dose:  10 mg       glimepiride 1 MG tablet   Last time this was given:  1 mg on 3/11/2017  8:09 AM   Commonly known as:  AMARYL    Take 1 mg by mouth every morning.   Dose:  1 mg       hydrochlorothiazide 50 MG Tabs   Last time this was given:  50 mg on 3/11/2017  8:11 AM   Commonly known as:  HYDRODIURIL    Take 50 mg by mouth every day.   Dose:  50 mg       lisinopril 20 MG Tabs   Last time this was given:  20 mg on 3/11/2017  8:12 AM   Commonly known as:  PRINIVIL    Take 20 mg by mouth every day.   Dose:  20 mg       metformin 1000 MG tablet   Last time this was given:  1,000 mg on 3/11/2017  8:11 AM   Commonly known as:  GLUCOPHAGE    Take 1,000 mg by mouth 2 times a day, with meals.   Dose:  1000 mg       omeprazole 20 MG Tbec delayed-release tablet   Commonly known as:  PRILOSEC    Take 1 Tab by mouth every day.   Dose:  1 Tab       vitamin D 1000 UNIT Tabs   Last time this was given:  5,000 Units on 3/11/2017  8:10 AM   Commonly known as:  cholecalciferol    Take 1,000 Units by mouth every day.   Dose:  1000 Units       VITAMIN E PO    Take 1 Tab by mouth every day.   Dose:  1 Tab         STOP taking these medications     CIPRO PO       hydrocodone/acetaminophen  MG Tabs   Commonly known as:  NORCO               Instructions           Diet / Nutrition:    Follow any diet instructions given to you by your doctor or the dietician, including how much salt (sodium) you are allowed each day.    If you are overweight, talk to your doctor about a weight reduction plan.    Activity:    Remain physically active following your doctor's instructions about exercise and activity.    Rest often.     Any time you become even a little tired or short of breath, SIT DOWN and rest.    Worsening Symptoms:    Report any of the following signs and symptoms to the doctor's office immediately:    *Pain of jaw, arm, or neck  *Chest pain not relieved by medication                                *Dizziness or loss of consciousness  *Difficulty breathing even when at rest   *More tired than usual                                       *Bleeding drainage or swelling of surgical site  *Swelling of feet, ankles, legs or stomach                 *Fever (>100ºF)  *Pink or blood tinged sputum  *Weight gain (3lbs/day or 5lbs /week)           *Shock from internal defibrillator (if applicable)  *Palpitations or irregular heartbeats                *Cool and/or numb extremities    Stroke Awareness    Common Risk Factors for Stroke include:    Age  Atrial Fibrillation  Carotid Artery Stenosis  Diabetes Mellitus  Excessive alcohol consumption  High blood pressure  Overweight   Physical inactivity  Smoking    Warning signs and symptoms of a stroke include:    *Sudden numbness or weakness of the face, arm or leg (especially on one side of the body).  *Sudden confusion, trouble speaking or understanding.  *Sudden trouble seeing in one or both eyes.  *Sudden trouble walking, dizziness, loss of balance or coordination.Sudden severe headache with no known cause.    It is very important to get treatment quickly when a stroke occurs. If you experience any of the above warning signs, call 911 immediately.                   Disclaimer         Quit Smoking / Tobacco Use:    I understand the use of any tobacco products increases my chance of suffering from future heart disease or stroke and could cause other illnesses which may shorten my life. Quitting the use of tobacco products is the single most important thing I can do to improve my health. For further information on smoking / tobacco cessation call a Toll Free Quit Line at 1-140.633.8470 (*[employer] [place]) or 1-638.155.1208 (American Lung Association) or you can access the web based program at www.lungusa.org.    [place] Tobacco Users Help Line:  (839) 771-2524       Toll Free: 1-568.653.8662  Quit Tobacco Program [facility]  (785) 685-7175    Crisis Hotline:    [place] Crisis Hotline:  7-479-KYAAGQF or 1-956.839.9736    [place] Crisis Hotline:    1-193.926.7973 or 307-190-5332    Discharge Survey:   Thank you for choosing [facility]. We hope we did everything we could to make your hospital stay a pleasant one. You may be receiving a phone survey and we would appreciate your time and participation in answering the questions. Your input is very valuable to us in our efforts to improve our service to our patients and their families.        My signature on this form indicates that:    1. I have reviewed and understand the above information.  2. My questions regarding this information have been answered to my satisfaction.  3. I have formulated a plan with my discharge nurse to obtain my prescribed medications for home.                  Disclaimer         __________________________________                     __________       ________                       Patient Signature                                                 Date                    Time

## 2017-03-08 NOTE — IP AVS SNAPSHOT
Simbiosis Access Code: [email or web address]  Expires: 4/10/2017 10:47 AM    Your email address is not on file at [facility].  Email Addresses are required for you to sign up for Simbiosis, please contact [phone] to verify your personal information and to provide your email address prior to attempting to register for Simbiosis.    Olga Lockhart  [address]  [place] [address]    Simbiosis  A secure, online tool to manage your health information     [facility]’s Simbiosis® is a secure, online tool that connects you to your personalized health information from the privacy of your home -- day or night - making it very easy for you to manage your healthcare. Once the activation process is completed, you can even access your medical information using the Simbiosis maria e, which is available for free in the Apple Maria E store or Google Play store.     To learn more about Simbiosis, visit [email or web address]    There are two levels of access available (as shown below):   My Chart Features  [facility] Primary Care Doctor [facility]  Specialists [facility]  Urgent  Care Non-[facility] Primary Care Doctor   Email your healthcare team securely and privately 24/7 X X X    Manage appointments: schedule your next appointment; view details of past/upcoming appointments X      Request prescription refills. X      View recent personal medical records, including lab and immunizations X X X X   View health record, including health history, allergies, medications X X X X   Read reports about your outpatient visits, procedures, consult and ER notes X X X X   See your discharge summary, which is a recap of your hospital and/or ER visit that includes your diagnosis, lab results, and care plan X X  X     How to register for BoxFoxt:  Once your e-mail address has been verified, follow the following steps to sign up for BoxFoxt.     1. Go to  [email or web address]  2. Click on the Sign Up Now box, which takes you to the New Member Sign Up page. You will need to  provide the following information:  a. Enter your Funplus Access Code exactly as it appears at the top of this page. (You will not need to use this code after you’ve completed the sign-up process. If you do not sign up before the expiration date, you must request a new code.)   b. Enter your date of birth.   c. Enter your home email address.   d. Click Submit, and follow the next screen’s instructions.  3. Create a Funplus ID. This will be your Funplus login ID and cannot be changed, so think of one that is secure and easy to remember.  4. Create a Funplus password. You can change your password at any time.  5. Enter your Password Reset Question and Answer. This can be used at a later time if you forget your password.   6. Enter your e-mail address. This allows you to receive e-mail notifications when new information is available in Funplus.  7. Click Sign Up. You can now view your health information.    For assistance activating your Funplus account, call [phone]

## 2017-03-08 NOTE — LETTER
[facility] ([facility] [place]) - [phone]  EHR eReferral Notification Requirements    To be sent by secure email to [email or web address] or   by fax to [phone]       FIELDS ARE REQUIRED TO BE COMPLETED     Patient Name:  Olga Lockhart  MRN:  [id number]   Account Number: [id number]                YOB: 1950    Date Roomed in ED:         Date First Observation Order Placed: 3/8/2017   11:19 AM  Date First Inpatient Order Placed: 3/8/2017   2:05 PM    Date of Previous Admission (Needed For Readmission Reviews Only):     Discharge Date and Time (if applicable): No discharge date for patient encounter.     PLEASE CHECK OFF TYPE OF REVIEW & CURRENT ADMISSION STATUS FROM LISTS BELOW  Type of Review:  Admission review    Dates to be Reviewed: 3/8/16, 3/9/16        Current Admission Status:  Inpatient    / Contact Number for EHR outcome/recommendation call:    Megan Villa [phone]     Attending Physician/ Contact Number (if not the same as in electronic record):  Dr Wild [phone]     Comments:  Patient has scheduled surgery, please review to see if pt met inpt      Additional Information being Emailed or Faxed:  attached below    Fax Handwritten Supporting Documents to EHR at [phone]        DATE OF SERVICE:  03/08/2017     SURGEON:  Yamilet Wild MD     ASSISTANT:  Sunny Quinonez PA-C     ANESTHESIOLOGIST:  Carlos Ramirez MD     PREOPERATIVE DIAGNOSES:  1.  An L4-L5 degenerative spondylolisthesis with some movement on flexion and    extension.  2.  Spinal stenosis L3-L4, L4-L5, and L5-S1 with right-sided L5-S1 disk    protrusion.  3.  Failed conservative therapy.  4.  Mechanical back pain.     POSTOPERATIVE DIAGNOSES:  1.  An L4-L5 degenerative spondylolisthesis with some movement on flexion and    extension.  2.  Spinal stenosis L3-L4, L4-L5, and L5-S1 with right-sided L5-S1 disk    protrusion.  3.  Failed conservative therapy.  4.  Mechanical back pain.     INDICATIONS:   The patient is a 66-year-old woman.  She has a ____.  She has a    lot of heavy work.  Her  has esophageal cancer.  She has had back    symptoms for many years.  She had an injury in the past.  She has gone through   all the conservative measures.  She still struggles with right-sided back    pain, it is mechanical.  It gets worse when she stands and walks.  She gets    pain down the leg ____, but she does have symptoms in the legs.  When I saw    her in the office last time she had numbness in the left thigh and pain in the   right posterior thigh.  She has failed all conservative measures.  She was    consequently offered surgery.     Her past medical history is significant for an atrophic left kidney as well as   diabetes and nonalcoholic liver disease.  Her preoperative physical    examination showed a reduced range of motion of the lumbar spine.  She is    tender over the facet joints on the right side.  She also has some tenderness    of the trochanteric bursa.  We talked about decompressing her and possibly    stabilization in view of the spondylolisthesis.  In view of the mechanical    back pain, she leaned towards and instrumented fusion.  The risks, benefits,    and alternatives were outlined in the consultation note.     TITLE OF PROCEDURE:  1.  L3-4, L4-5, L5-S1 decompressive laminectomy and bilateral foraminotomies.  2.  Transpedicular decompression of the exiting L5 nerve root with medial    facetectomies at L4-5 and decompression of the exiting L4 nerve root.  3.  O-arm navigation with screw placement.  4.  L4-5 nonsegmental fixation using Legacy pedicle screws and PEEK rods.  5.  L4-5 posterolateral fusion using a mixture of local morcellized autograft    as well as Progenix and MagniFuse allograft.  6.  Microscopic microdissection.     OPERATIVE FINDINGS:  She had a degree of lateral recess stenosis that was    moderate to severe.  She also had a dynamic spondylolisthesis at L4-5.        Significantly, she had a right-sided disk protrusion, but once she was    decompressed, the S1 nerve root was adequately decompressed, therefore, I did    not do a diskectomy.     OPERATIVE DETAILS:  After a fully informed consent, the patient was brought to   the operating room at [facility].  General anesthesia was   administered.  She was given intravenous antibiotic.  Richardson catheter was    placed.  She was then turned prone on the OSI operating table in the Luis Armando    frame.  All pressure points padded.  The posterior lumbar region was prepped    and draped in a standard fashion.  Midline incision after fluoroscopic    localization was then affected over the spinous processes from L3-S1.     Bilateral subperiosteal exposure was affected.  After the transverse processes   of L4 and L5, these were decorticated for later bone grafting.  A right iliac   spike was placed.  The O-arm was brought in and spin was affected.  I then    turned to cannulating and placing the screws at L4-L5.  In each case, the    screws were cannulated, palpated, tapped, stimulated and then 6.5x50 mm screws   were placed.  Bone purchase was good.  After placing the screws, an AP and    lateral x-ray was taken.  Microscope was brought in the field of view.  I then   performed a decompression.  This involved a laminectomy of the inferior 2/3    of L3, all of L4, all of L5, and portion of S1 and facetectomies at L4-5.  I    thinned down the lamina using an AM-8 drill bit under the microscope then    using 4, 3 and 2 mm Kerrison punches and completed the laminectomy.     Sequentially, I followed the L3, L4, L5 and S1 nerve roots bilaterally    transpedicular decompression of the L4 nerve roots at the L4-5 foramen were    affected bilaterally at the level of spondylolisthesis.  After the    decompression, all the nerve roots were free.  Hemostasis obtained.  The bone    graft was morcellized.  This was mixed with Progenix  and MagniFuse allograft    and placed in the decorticated posterolateral gutters.  Hemostasis was    obtained.  Two 40 mm PEEK rods were secured with locking caps.  Final AP and    lateral x-ray was taken.  Hemostasis was obtained.  Closure was then affected    over x2 suction subfascial Hemovacs.  Two paraspinal On-Q pain catheters were    placed.  The wound was closed using multiple interrupted Vicryl sutures and    then staples to skin.     COMPLICATIONS:  Nil.     ESTIMATED BLOOD LOSS:  200 mL.     The surgery went well.  We will see how things progress.        ____________________________________     VIOLA AVITIA MD     LHS / SYBIL     DD:  2017 16:40:34  DT:  2017 18:13:33     D#:  [id number]  Job#:  [id number]     cc: MINH MUÑIZ [facility], Jade Powers DO            Last signed by: Viola Avitia M.D. at 3/8/2017  6:41 PM          Progress Note                Author: Sunny Quinonez  Date & Time created: 3/9/2017  8:02 AM      Interval History:  NSX  POD #1 seen w/ Dr. Avitia  Pain controlled  Mobilizing  Dylan D/C'd this am  Eating and Drinking    No N/V    Review of Systems:  ROS    Physical Exam:  Physical Exam    Wound C/D/I  LE motor 5/5 throughout bilaterally  Labs stable  VSS  hemovacs 130 and 50cc    Labs:        Invalid input(s): [id number]      Recent Labs       17   0203    SODIUM   134*    POTASSIUM   4.1    CHLORIDE   103    CO2   25    BUN   21    CREATININE   1.00    CALCIUM   9.7    Recent Labs       17   0203    GLUCOSE   235*    Recent Labs       17   1336   17   0203    RBC   3.98*   3.98*    HEMOGLOBIN   12.3   12.4    HEMATOCRIT   36.6*   37.1    PLATELETCT   90*   102*    Recent Labs       17   1336   17   0203    WBC   3.8*   6.7    Hemodynamics:  Temp (24hrs), Av.6 °C (97.9 °F), Min:36.2 °C (97.1 °F), Max:37 °C (98.6 °F)  Temperature: 36.2 °C (97.1 °F)  Pulse  Av.6  Min: 66  Max: 87Heart Rate (Monitored): 85  Blood Pressure : 104/71  "mmHg, NIBP: 135/78 mmHg    Respiratory:  Respiration: 16, Pulse Oximetry: 99 %  RUL Breath Sounds: Clear, RML Breath Sounds: Clear, RLL Breath Sounds: Diminished, CITLALLI Breath Sounds: Clear, LLL Breath Sounds: Diminished  Fluids:    Intake/Output Summary (Last 24 hours) at 03/09/17 0802  Last data filed at 03/09/17 0600    Gross per 24 hour    Intake    3224 ml    Output    2030 ml    Net    1194 ml      Weight: 93.1 kg (205 lb 4 oz)  GI/Nutrition:  Orders Placed This Encounter    Procedures    •  DIET ORDER        Standing Status:  Standing          Number of Occurrences:  1          Standing Expiration Date:          Order Specific Question:   Diet:        Answer:   Diabetic [3]    Medical Decision Making, by Problem:  Active Hospital Problems      Diagnosis    •  Spinal stenosis, lumbar region, without neurogenic claudication [M48.06]        Plan:  1. MObilize with PT/OT  2. D/C PCA-start orals  3. Keep hemovacs in today  4. Try for home tomorrow    Core Measures    Vitals (last day)      Date/Time Temp BP NIBP Patient BP Position BP Location Pulse Resp SpO2 O2 (LPM) FIO2% O2 Daily Delivery Resp Device  Height Weight Nurse Pain Scale 0 - 10  Who     03/09/17 0927 -- -- -- -- -- -- -- -- -- -- -- -- -- 7 MM     03/09/17 0800 36.2 °C (97.1 °F) 118/68 mmHg -- -- -- 69 16 98 % 0 -- -- -- -- -- CW     03/09/17 0730 -- -- -- -- -- -- -- -- -- -- -- -- -- 6 SB     03/09/17 0140 -- -- -- -- -- -- -- -- -- -- -- -- -- 2 SB     03/09/17 0040 36.2 °C (97.1 °F) 104/71 mmHg -- Sitting Right;Upper Arm 66 16 99 % 2 -- -- -- -- -- AT     03/09/17 0000 36.6 °C (97.9 °F) 106/70 mmHg -- Sitting Right;Upper Arm 73 16 94 % 2 -- -- -- -- -- AT     03/08/17 2000 36.2 °C (97.2 °F) 130/70 mmHg -- Sitting Right;Upper Arm 67 16 97 % 2 -- -- 1.651 m (5' 5\") 93.1 kg (205 lb 4 oz) -- AT     03/08/17 1944 -- -- -- -- -- -- -- -- -- -- -- -- -- 5 SB     03/08/17 1845 36.6 °C (97.8 °F) 137/87 mmHg -- Sitting Right;Upper Arm 75 16 -- 3 -- -- -- -- " "-- MB     17 1801 -- -- 135/78 mmHg -- -- 83 21 100 % 3 -- -- -- -- 5      17 1746 -- -- 122/69 mmHg -- -- 87 20 99 % 3 -- -- -- -- 5      17 1716 -- -- 133/62 mmHg -- -- 81 20 99 % 3 -- -- -- -- 6      17 1701 -- -- 118/66 mmHg -- -- 81 18 98 % 3 -- -- -- -- --      17 1700 -- -- -- -- -- -- -- -- -- -- -- -- -- 8      17 1645 -- -- 149/79 mmHg -- -- -- -- -- -- -- -- -- -- 8      17 1636 37 °C (98.6 °F) -- 145/83 mmHg -- -- -- 16 96 % 5 -- -- -- -- Assumed Pain Present      17 1340 -- -- -- -- -- -- -- -- -- -- -- -- -- 9      17 1334 -- -- -- -- -- -- -- -- -- -- -- 1.651 m (5' 5\") 89.2 kg (196 lb 10.4 oz) --      17 1240 37 °C (98.6 °F) -- 122/72 mmHg -- -- 67 16 95 % 0 -- -- -- -- -- SK               H&P Note      No notes of this type exist for this encounter.          Wound Care Notes      No notes of this type exist for this encounter.          Consult Notes      No notes of this type exist for this encounter.          Medications      Continuous      Medication Dose/Rate, Route, Frequency Last Action     0.9 % NaCl with KCl 20 mEq infusion 100 mL/hr, IV, Continuous New Ba/09 0139     ropivacaine 0.2% (NAROPIN) 540 mg for On-Q pump infusion 4 mL/hr, OTHER, Continuous Ordered            Scheduled      Medication Dose/Rate, Route, Frequency Last Action     allopurinol (ZYLOPRIM) tablet 100 mg 100 mg, PO, DAILY Given: 931     atorvastatin (LIPITOR) tablet 20 mg 20 mg, PO, DAILY Given: 934     docusate sodium (COLACE) capsule 100 mg 100 mg, PO, BID Given: 932     fluoxetine (PROZAC) capsule 10 mg 10 mg, PO, DAILY Given: 932     glimepiride (AMARYL) tablet 1 mg 1 mg, PO, QAM Given: 932     hydrochlorothiazide (HYDRODIURIL) tablet 50 mg 50 mg, PO, DAILY Given: 932     lisinopril (PRINIVIL) tablet 20 mg 20 mg, PO, DAILY Given: 932     metformin (GLUCOPHAGE) tablet 1,000 mg 1,000 mg, " PO, BID WITH MEALS Given: 03/09 0931     senna-docusate (PERICOLACE or SENOKOT S) 8.6-50 MG per tablet 1 Tab 1 Tab, PO, Nightly Given: 03/08 1928     vitamin D (cholecalciferol) tablet 5,000 Units 5,000 Units, PO, DAILY Given: 03/09 0931            PRN      Medication Dose/Rate, Route, Frequency Last Action     benzocaine-menthol (CEPACOL) lozenge 1 Lozenge 1 Lozenge, MT, Q2HRS PRN Ordered     bisacodyl (DULCOLAX) suppository 10 mg 10 mg, MT, Q24HRS PRN Ordered     diphenhydrAMINE (BENADRYL) injection 25 mg 25 mg, IV, Q6HRS PRN Ordered     diphenhydrAMINE (BENADRYL) tablet/capsule 25 mg 25 mg, PO, Q6HRS PRN Ordered     fleet enema 133 mL 1 Each, MT, Once PRN Ordered     hydrocodone-acetaminophen (NORCO) 5-325 MG per tablet 1-2 Tab 1-2 Tab, PO, Q4HRS PRN Ordered     labetalol (NORMODYNE,TRANDATE) injection 10 mg 10 mg, IV, Q HOUR PRN Ordered     magnesium hydroxide (MILK OF MAGNESIA) suspension 30 mL 30 mL, PO, QDAY PRN Ordered     MD ALERT...Do not administer NSAIDS or ASPIRIN unless ORDERED By Neurosurgery 1 Each 1 Each, OTHER, PRN Ordered     methocarbamol (ROBAXIN) tablet 750 mg 750 mg, PO, Q8HRS PRN Given: 03/09 0728     ondansetron (ZOFRAN ODT) dispertab 4 mg 4 mg, PO, Q4HRS PRN Ordered     ondansetron (ZOFRAN) syringe/vial injection 4 mg 4 mg, IV, Q4HRS PRN Ordered     oxycodone-acetaminophen (PERCOCET) 5-325 MG per tablet 1-2 Tab 2 Tab, PO, Q4HRS PRN Given: 03/09 0931     polyethylene glycol/lytes (MIRALAX) PACKET 1 Packet 1 Packet, PO, BID PRN Ordered     senna-docusate (PERICOLACE or SENOKOT S) 8.6-50 MG per tablet 1 Tab 1 Tab, PO, Q24HRS PRN Ordered     zolpidem (AMBIEN) tablet 5 mg 5 mg, PO, HS PRN - MR X 1 Ordered                 Results - Last 24 Hours      Procedure Component Value Units Date/Time     Basic Metabolic Panel (BMP) [[id number]] (Abnormal) Collected: 03/09/17 0203     Order Status: Completed Specimen Information: Blood Updated: 03/09/17 0346      Sodium 134 (L) mmol/L       Potassium 4.1  mmol/L       Chloride 103 mmol/L       Co2 25 mmol/L       Glucose 235 (H) mg/dL       Bun 21 mg/dL       Creatinine 1.00 mg/dL       Calcium 9.7 mg/dL       Anion Gap 6.0      ESTIMATED GFR [[id number]] (Abnormal) Collected: 03/09/17 0203     Order Status: Completed Updated: 03/09/17 0346      GFR If African American >60 mL/min/1.73 m 2       GFR If Non  55 (A) mL/min/1.73 m 2      CBC without Differential [[id number]] (Abnormal) Collected: 03/09/17 0203     Order Status: Completed Specimen Information: Blood Updated: 03/09/17 0314      WBC 6.7 K/uL       RBC 3.98 (L) M/uL       Hemoglobin 12.4 g/dL       Hematocrit 37.1 %       MCV 93.2 fL       MCH 31.2 pg       MCHC 33.4 (L) g/dL       RDW 43.5 fL       Platelet Count 102 (L) K/uL       MPV 10.3 fL      COD (ADULT) [[id number]] Collected: 03/08/17 1311     Order Status: Completed Updated: 03/08/17 1423      ABO Grouping Only A       Rh Grouping Only NEG       Antibody Screen-Cod NEG      ABO CONFIRMATION [[id number]] Collected: 03/08/17 1316     Order Status: Completed Updated: 03/08/17 1423      Second ABO Typing With Cod A      CBC WITHOUT DIFFERENTIAL [[id number]] (Abnormal) Collected: 03/08/17 1336     Order Status: Completed Specimen Information: Blood Updated: 03/08/17 1341      WBC 3.8 (L) K/uL       RBC 3.98 (L) M/uL       Hemoglobin 12.3 g/dL       Hematocrit 36.6 (L) %       MCV 92.0 fL       MCH 30.9 pg       MCHC 33.6 g/dL       RDW 42.2 fL       Platelet Count 90 (L) K/uL       MPV 10.1 fL               Imaging Results - Last 24 Hours        DX-LUMBAR SPINE-2 OR 3 VIEWS [[id number]]  Resulted: 03/08/17 1644, Result status: Final result     Ordering provider: Yamilet Wild M.D.  03/08/17 0772 Order status: Completed     Resulted by: Gilmer Núñez M.D. Performed: 03/08/17 1405 - 03/08/17 1643     Resulting lab: RADIOLOGY       Narrative:          3/8/2017 2:05 PM    HISTORY/REASON FOR EXAM:  Intraoperative evaluation of the lumbar spine  surgery.      TECHNIQUE/ EXAM DESCRIPTION AND NUMBER OF VIEWS:  2 views of the lumbar spine.    COMPARISON: Lumbar spine radiographs 2/23/2017    FINDINGS:  2 images were obtained in the operating room.    A total of 6 seconds of fluoroscopy time utilized.    There are bilateral pedicle screw at L4-L5.    There are associated laminectomy.     Impression:          Intraoperative images as described.       DX-PORTABLE FLUORO > 1 HOUR [[id number]]  Resulted: 03/08/17 2001, Result status: Final result     Ordering provider: Yamilet Wild M.D.  03/08/17 0731 Order status: Completed     Resulted by: Gab Wilkins M.D. Performed: 03/08/17 1405 - 03/08/17 1640     Resulting lab: RADIOLOGY       Narrative:          3/8/2017 2:05 PM    HISTORY/REASON FOR EXAM:  Lumbar fusion, Main OR      TECHNIQUE/EXAM DESCRIPTION AND NUMBER OF VIEWS:  Portable fluoroscopy for greater than one hour      FINDINGS:      The portable fluoroscopy unit was obligated to the procedure for greater than one hour.   Actual fluoro time was 6 seconds.     Impression:            Portable fluoroscopy utilized for 6 seconds.       DX-O-ARM [[id number]]  Resulted: 03/08/17 2001, Result status: Final result     Ordering provider: Yamilet Wild M.D.  03/08/17 0731 Order status: Completed     Resulted by: Gab Wilkins M.D. Performed: 03/08/17 1405 - 03/08/17 1521     Resulting lab: RADIOLOGY       Narrative:          3/8/2017 2:05 PM    HISTORY/REASON FOR EXAM:  Lumbar fusion    TECHNIQUE/EXAM DESCRIPTION AND NUMBER OF VIEWS:  Portable O-arm    FINDINGS:  The O-arm unit was provided for intraoperative guidance in the OR for less than one hour. Actual fluoro time was 4.83 seconds.     Impression:          Portable O-arm utilized for 4.83 seconds.         [facility]  [address] [place] [address]  [phone]  [email or web address]    Updated December 17, 2014

## 2017-03-09 ENCOUNTER — PATIENT OUTREACH (OUTPATIENT)
Dept: HEALTH INFORMATION MANAGEMENT | Facility: OTHER | Age: 67
End: 2017-03-09

## 2017-03-09 LAB
ANION GAP SERPL CALC-SCNC: 6 MMOL/L (ref 0–11.9)
BUN SERPL-MCNC: 21 MG/DL (ref 8–22)
CALCIUM SERPL-MCNC: 9.7 MG/DL (ref 8.5–10.5)
CHLORIDE SERPL-SCNC: 103 MMOL/L (ref 96–112)
CO2 SERPL-SCNC: 25 MMOL/L (ref 20–33)
CREAT SERPL-MCNC: 1 MG/DL (ref 0.5–1.4)
ERYTHROCYTE [DISTWIDTH] IN BLOOD BY AUTOMATED COUNT: 43.5 FL (ref 35.9–50)
GFR SERPL CREATININE-BSD FRML MDRD: 55 ML/MIN/1.73 M 2
GLUCOSE BLD-MCNC: 97 MG/DL (ref 65–99)
GLUCOSE SERPL-MCNC: 235 MG/DL (ref 65–99)
HCT VFR BLD AUTO: 37.1 % (ref 37–47)
HGB BLD-MCNC: 12.4 G/DL (ref 12–16)
MCH RBC QN AUTO: 31.2 PG (ref 27–33)
MCHC RBC AUTO-ENTMCNC: 33.4 G/DL (ref 33.6–35)
MCV RBC AUTO: 93.2 FL (ref 81.4–97.8)
PLATELET # BLD AUTO: 102 K/UL (ref 164–446)
PMV BLD AUTO: 10.3 FL (ref 9–12.9)
POTASSIUM SERPL-SCNC: 4.1 MMOL/L (ref 3.6–5.5)
RBC # BLD AUTO: 3.98 M/UL (ref 4.2–5.4)
SODIUM SERPL-SCNC: 134 MMOL/L (ref 135–145)
WBC # BLD AUTO: 6.7 K/UL (ref 4.8–10.8)

## 2017-03-09 PROCEDURE — 700102 HCHG RX REV CODE 250 W/ 637 OVERRIDE(OP): Performed by: PHYSICIAN ASSISTANT

## 2017-03-09 PROCEDURE — 97535 SELF CARE MNGMENT TRAINING: CPT

## 2017-03-09 PROCEDURE — 97161 PT EVAL LOW COMPLEX 20 MIN: CPT

## 2017-03-09 PROCEDURE — 700112 HCHG RX REV CODE 229: Performed by: PHYSICIAN ASSISTANT

## 2017-03-09 PROCEDURE — 36415 COLL VENOUS BLD VENIPUNCTURE: CPT

## 2017-03-09 PROCEDURE — A9270 NON-COVERED ITEM OR SERVICE: HCPCS | Performed by: PHYSICIAN ASSISTANT

## 2017-03-09 PROCEDURE — G8980 MOBILITY D/C STATUS: HCPCS | Mod: CI

## 2017-03-09 PROCEDURE — 82962 GLUCOSE BLOOD TEST: CPT

## 2017-03-09 PROCEDURE — G8979 MOBILITY GOAL STATUS: HCPCS | Mod: CI

## 2017-03-09 PROCEDURE — G8987 SELF CARE CURRENT STATUS: HCPCS | Mod: CI

## 2017-03-09 PROCEDURE — 770001 HCHG ROOM/CARE - MED/SURG/GYN PRIV*

## 2017-03-09 PROCEDURE — G8988 SELF CARE GOAL STATUS: HCPCS | Mod: CI

## 2017-03-09 PROCEDURE — 80048 BASIC METABOLIC PNL TOTAL CA: CPT

## 2017-03-09 PROCEDURE — G8978 MOBILITY CURRENT STATUS: HCPCS | Mod: CI

## 2017-03-09 PROCEDURE — 97165 OT EVAL LOW COMPLEX 30 MIN: CPT

## 2017-03-09 PROCEDURE — 700101 HCHG RX REV CODE 250: Performed by: PHYSICIAN ASSISTANT

## 2017-03-09 PROCEDURE — 85027 COMPLETE CBC AUTOMATED: CPT

## 2017-03-09 PROCEDURE — G8989 SELF CARE D/C STATUS: HCPCS | Mod: CI

## 2017-03-09 PROCEDURE — 700111 HCHG RX REV CODE 636 W/ 250 OVERRIDE (IP): Performed by: PHYSICIAN ASSISTANT

## 2017-03-09 RX ORDER — OXYCODONE HYDROCHLORIDE AND ACETAMINOPHEN 5; 325 MG/1; MG/1
1-2 TABLET ORAL EVERY 4 HOURS PRN
Status: DISCONTINUED | OUTPATIENT
Start: 2017-03-09 | End: 2017-03-11 | Stop reason: HOSPADM

## 2017-03-09 RX ORDER — HYDROCODONE BITARTRATE AND ACETAMINOPHEN 5; 325 MG/1; MG/1
1-2 TABLET ORAL EVERY 4 HOURS PRN
Status: DISCONTINUED | OUTPATIENT
Start: 2017-03-09 | End: 2017-03-11 | Stop reason: HOSPADM

## 2017-03-09 RX ADMIN — METFORMIN HYDROCHLORIDE 1000 MG: 500 TABLET, FILM COATED ORAL at 09:31

## 2017-03-09 RX ADMIN — ALLOPURINOL 100 MG: 100 TABLET ORAL at 09:31

## 2017-03-09 RX ADMIN — FLUOXETINE 10 MG: 10 CAPSULE ORAL at 09:32

## 2017-03-09 RX ADMIN — OXYCODONE HYDROCHLORIDE AND ACETAMINOPHEN 2 TABLET: 5; 325 TABLET ORAL at 09:31

## 2017-03-09 RX ADMIN — LISINOPRIL 20 MG: 20 TABLET ORAL at 09:32

## 2017-03-09 RX ADMIN — METFORMIN HYDROCHLORIDE 1000 MG: 500 TABLET, FILM COATED ORAL at 17:27

## 2017-03-09 RX ADMIN — METHOCARBAMOL 750 MG: 750 TABLET ORAL at 07:28

## 2017-03-09 RX ADMIN — VITAMIN D, TAB 1000IU (100/BT) 5000 UNITS: 25 TAB at 09:31

## 2017-03-09 RX ADMIN — GLIMEPIRIDE 1 MG: 2 TABLET ORAL at 09:32

## 2017-03-09 RX ADMIN — STANDARDIZED SENNA CONCENTRATE AND DOCUSATE SODIUM 1 TABLET: 8.6; 5 TABLET, FILM COATED ORAL at 21:40

## 2017-03-09 RX ADMIN — HYDROCHLOROTHIAZIDE 50 MG: 25 TABLET ORAL at 09:32

## 2017-03-09 RX ADMIN — OXYCODONE HYDROCHLORIDE AND ACETAMINOPHEN 2 TABLET: 5; 325 TABLET ORAL at 13:59

## 2017-03-09 RX ADMIN — POTASSIUM CHLORIDE AND SODIUM CHLORIDE: 900; 150 INJECTION, SOLUTION INTRAVENOUS at 01:39

## 2017-03-09 RX ADMIN — OXYCODONE HYDROCHLORIDE AND ACETAMINOPHEN 2 TABLET: 5; 325 TABLET ORAL at 21:40

## 2017-03-09 RX ADMIN — DEXAMETHASONE SODIUM PHOSPHATE 4 MG: 4 INJECTION, SOLUTION INTRAMUSCULAR; INTRAVENOUS at 05:12

## 2017-03-09 RX ADMIN — OXYCODONE HYDROCHLORIDE AND ACETAMINOPHEN 2 TABLET: 5; 325 TABLET ORAL at 18:11

## 2017-03-09 RX ADMIN — ATORVASTATIN CALCIUM 20 MG: 40 TABLET, FILM COATED ORAL at 09:34

## 2017-03-09 RX ADMIN — DOCUSATE SODIUM 100 MG: 100 CAPSULE ORAL at 09:32

## 2017-03-09 RX ADMIN — DOCUSATE SODIUM 100 MG: 100 CAPSULE ORAL at 21:40

## 2017-03-09 RX ADMIN — CEFAZOLIN SODIUM 2 G: 2 INJECTION, SOLUTION INTRAVENOUS at 02:47

## 2017-03-09 ASSESSMENT — GAIT ASSESSMENTS
GAIT LEVEL OF ASSIST: STAND BY ASSIST
DISTANCE (FEET): 200
ASSISTIVE DEVICE: FRONT WHEEL WALKER

## 2017-03-09 ASSESSMENT — PAIN SCALES - GENERAL
PAINLEVEL_OUTOF10: 2
PAINLEVEL_OUTOF10: 6
PAINLEVEL_OUTOF10: 7
PAINLEVEL_OUTOF10: 7
PAINLEVEL_OUTOF10: 6

## 2017-03-09 ASSESSMENT — ACTIVITIES OF DAILY LIVING (ADL): TOILETING: INDEPENDENT

## 2017-03-09 NOTE — OP REPORT
DATE OF SERVICE:  03/08/2017    SURGEON:  Yamilet Wild MD    ASSISTANT:  Sunny Quinonez PA-C    ANESTHESIOLOGIST:  Carlos Ramirez MD    PREOPERATIVE DIAGNOSES:  1.  An L4-L5 degenerative spondylolisthesis with some movement on flexion and   extension.  2.  Spinal stenosis L3-L4, L4-L5, and L5-S1 with right-sided L5-S1 disk   protrusion.  3.  Failed conservative therapy.  4.  Mechanical back pain.    POSTOPERATIVE DIAGNOSES:  1.  An L4-L5 degenerative spondylolisthesis with some movement on flexion and   extension.  2.  Spinal stenosis L3-L4, L4-L5, and L5-S1 with right-sided L5-S1 disk   protrusion.  3.  Failed conservative therapy.  4.  Mechanical back pain.    INDICATIONS:  The patient is a 66-year-old woman.  She has a ____.  She has a   lot of heavy work.  Her  has esophageal cancer.  She has had back   symptoms for many years.  She had an injury in the past.  She has gone through   all the conservative measures.  She still struggles with right-sided back   pain, it is mechanical.  It gets worse when she stands and walks.  She gets   pain down the leg ____, but she does have symptoms in the legs.  When I saw   her in the office last time she had numbness in the left thigh and pain in the   right posterior thigh.  She has failed all conservative measures.  She was   consequently offered surgery.    Her past medical history is significant for an atrophic left kidney as well as   diabetes and nonalcoholic liver disease.  Her preoperative physical   examination showed a reduced range of motion of the lumbar spine.  She is   tender over the facet joints on the right side.  She also has some tenderness   of the trochanteric bursa.  We talked about decompressing her and possibly   stabilization in view of the spondylolisthesis.  In view of the mechanical   back pain, she leaned towards and instrumented fusion.  The risks, benefits,   and alternatives were outlined in the consultation note.    TITLE OF  PROCEDURE:  1.  L3-4, L4-5, L5-S1 decompressive laminectomy and bilateral foraminotomies.  2.  Transpedicular decompression of the exiting L5 nerve root with medial   facetectomies at L4-5 and decompression of the exiting L4 nerve root.  3.  O-arm navigation with screw placement.  4.  L4-5 nonsegmental fixation using Legacy pedicle screws and PEEK rods.  5.  L4-5 posterolateral fusion using a mixture of local morcellized autograft   as well as Progenix and MagniFuse allograft.  6.  Microscopic microdissection.    OPERATIVE FINDINGS:  She had a degree of lateral recess stenosis that was   moderate to severe.  She also had a dynamic spondylolisthesis at L4-5.    Significantly, she had a right-sided disk protrusion, but once she was   decompressed, the S1 nerve root was adequately decompressed, therefore, I did   not do a diskectomy.    OPERATIVE DETAILS:  After a fully informed consent, the patient was brought to   the operating room at [facility].  General anesthesia was   administered.  She was given intravenous antibiotic.  Richardson catheter was   placed.  She was then turned prone on the OSI operating table in the Luis Armando   frame.  All pressure points padded.  The posterior lumbar region was prepped   and draped in a standard fashion.  Midline incision after fluoroscopic   localization was then affected over the spinous processes from L3-S1.    Bilateral subperiosteal exposure was affected.  After the transverse processes   of L4 and L5, these were decorticated for later bone grafting.  A right iliac   spike was placed.  The O-arm was brought in and spin was affected.  I then   turned to cannulating and placing the screws at L4-L5.  In each case, the   screws were cannulated, palpated, tapped, stimulated and then 6.5x50 mm screws   were placed.  Bone purchase was good.  After placing the screws, an AP and   lateral x-ray was taken.  Microscope was brought in the field of view.  I then   performed a  decompression.  This involved a laminectomy of the inferior 2/3   of L3, all of L4, all of L5, and portion of S1 and facetectomies at L4-5.  I   thinned down the lamina using an AM-8 drill bit under the microscope then   using 4, 3 and 2 mm Kerrison punches and completed the laminectomy.    Sequentially, I followed the L3, L4, L5 and S1 nerve roots bilaterally   transpedicular decompression of the L4 nerve roots at the L4-5 foramen were   affected bilaterally at the level of spondylolisthesis.  After the   decompression, all the nerve roots were free.  Hemostasis obtained.  The bone   graft was morcellized.  This was mixed with Progenix and MagniFuse allograft   and placed in the decorticated posterolateral gutters.  Hemostasis was   obtained.  Two 40 mm PEEK rods were secured with locking caps.  Final AP and   lateral x-ray was taken.  Hemostasis was obtained.  Closure was then affected   over x2 suction subfascial Hemovacs.  Two paraspinal On-Q pain catheters were   placed.  The wound was closed using multiple interrupted Vicryl sutures and   then staples to skin.    COMPLICATIONS:  Nil.    ESTIMATED BLOOD LOSS:  200 mL.    The surgery went well.  We will see how things progress.       ____________________________________     MD GOVIND MORALES / SYBIL    DD:  03/08/2017 16:40:34  DT:  03/08/2017 18:13:33    D#:  [id number]  Job#:  [id number]    cc: Jade COBOS DO

## 2017-03-09 NOTE — OR SURGEON
Immediate Post-Operative Note      PreOp Diagnosis: spinal stenosis  PostOp Diagnosis: spinal stenosis    Procedure(s):  LUMBAR FUSION O-ARM L4-5 INSTRUMENTED - Wound Class: Clean with Drain  LUMBAR L3-S1 DECOMPRESSIVE LAMINECTOMY RT L5-S1 MICRODISKECTOMY IF NEEDED - Wound Class: Clean with Drain  FORAMINOTOMY - Wound Class: Clean with Drain    Surgeon(s):  Yamilet Wild M.D.    Anesthesiologist/Type of Anesthesia:  Anesthesiologist: Carlos Ramirez M.D./General    Surgical Staff:  Circulator: Angelina Aggarwal R.N.  Relief Circulator: Trevor Haley R.N.  Scrub Person: Lelia Sarah; Rosalva Rojas Assist: Sunny Quinonez PA-C  Radiology Technician: Olga Singh    Assistants: Sunny Quinonez PA-C,  Specimen: nil    Estimated Blood Loss: 200 cc    Findings: n/a    Complications: nil        3/8/2017 4:32 PM Yamilet Wild

## 2017-03-09 NOTE — THERAPY
"Physical Therapy Evaluation completed.   Bed Mobility:  Supine to Sit: Stand by Assist (good use of log roll.)  Transfers: Sit to Stand: Contact Guard Assist  Gait: Level Of Assist: Stand by Assist with Front-Wheel Walker       Plan of Care: Patient with no further skilled PT needs in the acute care setting at this time  Discharge Recommendations: Equipment: No Equipment Needed. Post-acute therapy recommended after discharged home.  Pt presents with tolerance for ambulation x 200 feet using FWW and up/down 5 stairs. Pt needed min A on stairs for review of sequencing to avoid taxing her sore R knee (chronic). Pt will have assist at home from family as needed, and has been instructed in spinal precautions and technique to don/doff brace. No further inpt PT needs.     See \"Rehab Therapy-Acute\" Patient Summary Report for complete documentation.     "

## 2017-03-09 NOTE — DISCHARGE PLANNING
Care Transition Team Assessment    IHD met with patient bedside. She stated that her daughter in law lives in [place], but visits regularly and is a CNA, so she and her  are well taken care of. She is not sure what her needs are after the hospital, but feels comfortable going home. She mentioned she is taking depression medication because her  has stage 4 cancer, and she takes her medication regularly. No major needs or concerns identified.     Information Source  Orientation : Oriented x 4  Information Given By: Patient  Informant's Name: Olga  Who is responsible for making decisions for patient? : Patient    Readmission Evaluation  Is this a readmission?: No    Elopement Risk  Legal Hold: No  Ambulatory or Self Mobile in Wheelchair: No-Not an Elopement Risk  Elopement Risk: Not at Risk for Elopement    Interdisciplinary Discharge Planning  Primary Care Physician: Olga DIAZ ([place])  Lives with - Patient's Self Care Capacity: Spouse  Patient or legal guardian wants to designate a caregiver (see row info): No  Support Systems: Family Member(s), Spouse / Significant Other  Housing / Facility: [address]  Do You Take your Prescribed Medications Regularly: Yes  Able to Return to Previous ADL's: Yes  Mobility Issues: Yes  Prior Services: None  Patient Expects to be Discharged to:: Home  Assistance Needed: No  Durable Medical Equipment: Walker, Other - Specify (Cane)    Discharge Preparedness  What is your plan after discharge?: Home with help  What are your discharge supports?: Child, Spouse  Prior Functional Level: Ambulatory, Uses Cane, Uses Walker, Independent with Activities of Daily Living  Difficulity with ADLs: Walking  Difficulty with ADLs Comment: Uses walker and cane   Difficulity with IADLs: Driving  Difficulity with IADL Comments: /family drives    Functional Assesment  Prior Functional Level: Ambulatory, Uses Cane, Uses Walker, Independent with Activities of Daily  Living    Finances  Financial Barriers to Discharge: No  Prescription Coverage: Yes ([facility] on [address])    Vision / Hearing Impairment  Vision Impairment : Yes  Right Eye Vision: Impaired, Wears Glasses  Left Eye Vision: Impaired, Wears Glasses  Hearing Impairment : No    Values / Beliefs / Concerns  Values / Beliefs Concerns : No  Spiritual Requests During Hospitalization: No    Advance Directive  Advance Directive?: None  Advance Directive offered?: AD Booklet refused    Domestic Abuse  Have you ever been the victim of abuse or violence?: No  Physical Abuse or Sexual Abuse: No  Verbal Abuse or Emotional Abuse: No  Possible Abuse Reported to:: Not Applicable    Psychological Assessment  History of Substance Abuse: None  History of Psychiatric Problems: Yes (Depression)  Non-compliant with Treatment: No  Newly Diagnosed Illness: No    Discharge Risks or Barriers  Discharge risks or barriers?: No    Anticipated Discharge Information  Anticipated discharge disposition: Discharge needs currently unknown  Discharge Address: [address] [place] [id number]  Discharge Contact Phone Number: [phone]

## 2017-03-09 NOTE — PROGRESS NOTES
Progress Note               Author: Sunny Quinonez Date & Time created: 3/9/2017  8:02 AM     Interval History:  NSX  POD #1 seen w/ Dr. Wild  Pain controlled  Mobilizing  Richardson D/C'd this am  Eating and Drinking   No N/V    Review of Systems:  ROS    Physical Exam:  Physical Exam   Wound C/D/I  LE motor 5/5 throughout bilaterally  Labs stable  VSS  hemovacs 130 and 50cc    Labs:        Invalid input(s): [id number]      Recent Labs      17   0203   SODIUM  134*   POTASSIUM  4.1   CHLORIDE  103   CO2  25   BUN  21   CREATININE  1.00   CALCIUM  9.7     Recent Labs      17   0203   GLUCOSE  235*     Recent Labs      17   1336  17   0203   RBC  3.98*  3.98*   HEMOGLOBIN  12.3  12.4   HEMATOCRIT  36.6*  37.1   PLATELETCT  90*  102*     Recent Labs      17   1336  17   0203   WBC  3.8*  6.7     Hemodynamics:  Temp (24hrs), Av.6 °C (97.9 °F), Min:36.2 °C (97.1 °F), Max:37 °C (98.6 °F)  Temperature: 36.2 °C (97.1 °F)  Pulse  Av.6  Min: 66  Max: 87Heart Rate (Monitored): 85  Blood Pressure : 104/71 mmHg, NIBP: 135/78 mmHg     Respiratory:    Respiration: 16, Pulse Oximetry: 99 %        RUL Breath Sounds: Clear, RML Breath Sounds: Clear, RLL Breath Sounds: Diminished, CITLALLI Breath Sounds: Clear, LLL Breath Sounds: Diminished  Fluids:    Intake/Output Summary (Last 24 hours) at 17 0802  Last data filed at 17 0600   Gross per 24 hour   Intake   3224 ml   Output   2030 ml   Net   1194 ml     Weight: 93.1 kg (205 lb 4 oz)  GI/Nutrition:  Orders Placed This Encounter   Procedures   • DIET ORDER     Standing Status: Standing      Number of Occurrences: 1      Standing Expiration Date:      Order Specific Question:  Diet:     Answer:  Diabetic [3]     Medical Decision Making, by Problem:  Active Hospital Problems    Diagnosis   • Spinal stenosis, lumbar region, without neurogenic claudication [M48.06]       Plan:  1. MObilize with PT/OT  2. D/C PCA-start orals  3. Keep  hemovacs in today  4. Try for home tomorrow    Core Measures

## 2017-03-09 NOTE — PROGRESS NOTES
Patient alert and oriented. Denies numbness and tingling. PCA hanging at beginning of shift. IVF running. Dressing clean dry and intact. 2 hemovacs in place. ON q pump intact. Patient sleeping but awakes easily. Plan of care discussed with Patient. Bed alarm on. Call light within reach. Bed locked and in low position. Patient encouraged to call with any needs/concerns.

## 2017-03-09 NOTE — THERAPY
"Occupational Therapy Evaluation completed.   Functional Status:  Supv supine > < EOB, supv transfers with FWW, supv LB dressing without AE, supv toileting, supv standing oral care  Plan of Care: Patient with no further skilled OT needs in the acute care setting at this time  Discharge Recommendations:  Equipment: No Equipment Needed. Post-acute therapy recommended after discharged home.    See \"Rehab Therapy-Acute\" Patient Summary Report for complete documentation.    66 y.o. female s/p L3-S1 lami/fusion. OT provided education/training on safe body mechanics/neutral spine during functional activity as well as compensatory techniques. Good demo and understanding. Recommended shower chair for home use. Pt states she will use plastic pation chair with supv from spouse. Also educated pt on safe technique for kodi hygiene using anterior approach, but still wiping front to back. Pt is completing basic ADL and mobility with no more than supv. No further acute OT needs at this time.     "

## 2017-03-10 PROBLEM — M48.061 SPINAL STENOSIS, LUMBAR REGION, WITHOUT NEUROGENIC CLAUDICATION: Status: RESOLVED | Noted: 2017-03-08 | Resolved: 2017-03-10

## 2017-03-10 LAB
ANION GAP SERPL CALC-SCNC: 7 MMOL/L (ref 0–11.9)
BUN SERPL-MCNC: 25 MG/DL (ref 8–22)
CALCIUM SERPL-MCNC: 9.9 MG/DL (ref 8.5–10.5)
CHLORIDE SERPL-SCNC: 105 MMOL/L (ref 96–112)
CO2 SERPL-SCNC: 27 MMOL/L (ref 20–33)
CREAT SERPL-MCNC: 0.93 MG/DL (ref 0.5–1.4)
ERYTHROCYTE [DISTWIDTH] IN BLOOD BY AUTOMATED COUNT: 43.9 FL (ref 35.9–50)
GFR SERPL CREATININE-BSD FRML MDRD: >60 ML/MIN/1.73 M 2
GLUCOSE SERPL-MCNC: 135 MG/DL (ref 65–99)
HCT VFR BLD AUTO: 31.8 % (ref 37–47)
HGB BLD-MCNC: 10.7 G/DL (ref 12–16)
MCH RBC QN AUTO: 31.2 PG (ref 27–33)
MCHC RBC AUTO-ENTMCNC: 33.6 G/DL (ref 33.6–35)
MCV RBC AUTO: 92.7 FL (ref 81.4–97.8)
PLATELET # BLD AUTO: 110 K/UL (ref 164–446)
PMV BLD AUTO: 10.6 FL (ref 9–12.9)
POTASSIUM SERPL-SCNC: 4 MMOL/L (ref 3.6–5.5)
RBC # BLD AUTO: 3.43 M/UL (ref 4.2–5.4)
SODIUM SERPL-SCNC: 139 MMOL/L (ref 135–145)
WBC # BLD AUTO: 6.5 K/UL (ref 4.8–10.8)

## 2017-03-10 PROCEDURE — 85027 COMPLETE CBC AUTOMATED: CPT

## 2017-03-10 PROCEDURE — A9270 NON-COVERED ITEM OR SERVICE: HCPCS | Performed by: PHYSICIAN ASSISTANT

## 2017-03-10 PROCEDURE — 700112 HCHG RX REV CODE 229: Performed by: PHYSICIAN ASSISTANT

## 2017-03-10 PROCEDURE — 770001 HCHG ROOM/CARE - MED/SURG/GYN PRIV*

## 2017-03-10 PROCEDURE — 36415 COLL VENOUS BLD VENIPUNCTURE: CPT

## 2017-03-10 PROCEDURE — 700111 HCHG RX REV CODE 636 W/ 250 OVERRIDE (IP): Performed by: PHYSICIAN ASSISTANT

## 2017-03-10 PROCEDURE — 700102 HCHG RX REV CODE 250 W/ 637 OVERRIDE(OP): Performed by: PHYSICIAN ASSISTANT

## 2017-03-10 PROCEDURE — 80048 BASIC METABOLIC PNL TOTAL CA: CPT

## 2017-03-10 RX ORDER — OXYCODONE HYDROCHLORIDE AND ACETAMINOPHEN 5; 325 MG/1; MG/1
1-2 TABLET ORAL EVERY 4 HOURS PRN
Qty: 90 TAB | Refills: 0 | Status: SHIPPED | OUTPATIENT
Start: 2017-03-10 | End: 2018-07-13

## 2017-03-10 RX ORDER — TIZANIDINE 4 MG/1
4 TABLET ORAL EVERY 6 HOURS PRN
Qty: 90 TAB | Refills: 0 | Status: SHIPPED | OUTPATIENT
Start: 2017-03-10 | End: 2018-07-13

## 2017-03-10 RX ORDER — OMEPRAZOLE 20 MG/1
20 CAPSULE, DELAYED RELEASE ORAL DAILY
Status: DISCONTINUED | OUTPATIENT
Start: 2017-03-10 | End: 2017-03-11 | Stop reason: HOSPADM

## 2017-03-10 RX ORDER — CEPHALEXIN 500 MG/1
500 CAPSULE ORAL 4 TIMES DAILY
Qty: 20 CAP | Refills: 0 | Status: SHIPPED | OUTPATIENT
Start: 2017-03-10 | End: 2018-07-13

## 2017-03-10 RX ADMIN — ALLOPURINOL 100 MG: 100 TABLET ORAL at 09:27

## 2017-03-10 RX ADMIN — OXYCODONE HYDROCHLORIDE AND ACETAMINOPHEN 2 TABLET: 5; 325 TABLET ORAL at 15:30

## 2017-03-10 RX ADMIN — ATORVASTATIN CALCIUM 20 MG: 40 TABLET, FILM COATED ORAL at 09:25

## 2017-03-10 RX ADMIN — METFORMIN HYDROCHLORIDE 1000 MG: 500 TABLET, FILM COATED ORAL at 17:44

## 2017-03-10 RX ADMIN — DOCUSATE SODIUM 100 MG: 100 CAPSULE ORAL at 09:26

## 2017-03-10 RX ADMIN — OMEPRAZOLE 20 MG: 20 CAPSULE, DELAYED RELEASE ORAL at 15:30

## 2017-03-10 RX ADMIN — OXYCODONE HYDROCHLORIDE AND ACETAMINOPHEN 2 TABLET: 5; 325 TABLET ORAL at 02:26

## 2017-03-10 RX ADMIN — STANDARDIZED SENNA CONCENTRATE AND DOCUSATE SODIUM 1 TABLET: 8.6; 5 TABLET, FILM COATED ORAL at 19:56

## 2017-03-10 RX ADMIN — METHOCARBAMOL 750 MG: 750 TABLET ORAL at 17:45

## 2017-03-10 RX ADMIN — LISINOPRIL 20 MG: 20 TABLET ORAL at 09:25

## 2017-03-10 RX ADMIN — METHOCARBAMOL 750 MG: 750 TABLET ORAL at 09:28

## 2017-03-10 RX ADMIN — GLIMEPIRIDE 1 MG: 2 TABLET ORAL at 09:27

## 2017-03-10 RX ADMIN — ONDANSETRON 4 MG: 2 INJECTION, SOLUTION INTRAMUSCULAR; INTRAVENOUS at 00:08

## 2017-03-10 RX ADMIN — HYDROCHLOROTHIAZIDE 50 MG: 25 TABLET ORAL at 09:28

## 2017-03-10 RX ADMIN — METFORMIN HYDROCHLORIDE 1000 MG: 500 TABLET, FILM COATED ORAL at 09:27

## 2017-03-10 RX ADMIN — VITAMIN D, TAB 1000IU (100/BT) 5000 UNITS: 25 TAB at 09:25

## 2017-03-10 RX ADMIN — DOCUSATE SODIUM 100 MG: 100 CAPSULE ORAL at 19:56

## 2017-03-10 RX ADMIN — OXYCODONE HYDROCHLORIDE AND ACETAMINOPHEN 2 TABLET: 5; 325 TABLET ORAL at 19:57

## 2017-03-10 RX ADMIN — OXYCODONE HYDROCHLORIDE AND ACETAMINOPHEN 2 TABLET: 5; 325 TABLET ORAL at 06:23

## 2017-03-10 RX ADMIN — OXYCODONE HYDROCHLORIDE AND ACETAMINOPHEN 2 TABLET: 5; 325 TABLET ORAL at 11:30

## 2017-03-10 RX ADMIN — FLUOXETINE 10 MG: 10 CAPSULE ORAL at 09:27

## 2017-03-10 ASSESSMENT — PAIN SCALES - GENERAL
PAINLEVEL_OUTOF10: 7
PAINLEVEL_OUTOF10: 3
PAINLEVEL_OUTOF10: 4
PAINLEVEL_OUTOF10: 3

## 2017-03-10 NOTE — PROGRESS NOTES
Paged on call for Dr. Wild regarding On-Q pump. One of the lines came completely out.     Per Mele Francois, instructed to clamp the line and let the other continue running.

## 2017-03-10 NOTE — PROGRESS NOTES
Pt is experiencing moderate indigestion. Pt states she normally takes Omeprazole at home. Paged on call for  to obtain orders.   Per Sunny Quinonez, orders via telephone w/readback for Omeprazole 20 mg PO Daily.

## 2017-03-10 NOTE — PROGRESS NOTES
Patient alert and oriented. Denies numbness and tingling. 1 assist with walker. Pain controlled with prn medications. 2 hemovacs intact. ON Q pump intact. Plan of care discussed with patient. Bed alarm on. Call light within reach. Bed locked and in low position. Patient encouraged to call with any needs/concerns.

## 2017-03-10 NOTE — PROGRESS NOTES
Received report from Rosemarie ROA. Pt is A&Ox4. Denies any n/v or n/t. States back pain of 7/10. Medicated appropriately per MAR w/positive results. Pt states pain is a 2-4 while resting, but increases w/movement. Pt updated on POC. All needs met at this time. Pt is instructed to call when in need of assistance. Bed in lowest and locked position. Call light within reach. Bed alarm and hourly rounding in place.

## 2017-03-10 NOTE — PROGRESS NOTES
Progress Note               Author: Sunny Quinonez Date & Time created: 3/10/2017  8:20 AM     Interval History:  NSX  POD #2  Pain controlled w/ orals  Mobilizing  Voiding  Eating and Drinking   No N/V    Review of Systems:  ROS    Physical Exam:  Physical Exam   Wound C/D/I  LE motor 5/5 throughout bilaterally  Labs stable  VSS  hemovacs 80 and 50cc  Mobilizing yesterday    Labs:        Invalid input(s): [id number]      Recent Labs      173  03/10/17   0224   SODIUM  134*  139   POTASSIUM  4.1  4.0   CHLORIDE  103  105   CO2  25  27   BUN  21  25*   CREATININE  1.00  0.93   CALCIUM  9.7  9.9     Recent Labs      173  03/10/17   0224   GLUCOSE  235*  135*     Recent Labs      17   1336  173  03/10/17   0224   RBC  3.98*  3.98*  3.43*   HEMOGLOBIN  12.3  12.4  10.7*   HEMATOCRIT  36.6*  37.1  31.8*   PLATELETCT  90*  102*  110*     Recent Labs      176  03/09/17   0203  03/10/17   0224   WBC  3.8*  6.7  6.5     Hemodynamics:  Temp (24hrs), Av.6 °C (97.9 °F), Min:36.6 °C (97.8 °F), Max:36.7 °C (98.1 °F)  Temperature: 36.6 °C (97.8 °F)  Pulse  Av.4  Min: 59  Max: 87   Blood Pressure : (!) 87/47 mmHg (Rn Notified)     Respiratory:    Respiration: 16, Pulse Oximetry: 97 %        RUL Breath Sounds: Clear, RML Breath Sounds: Clear, RLL Breath Sounds: Diminished, CITLALLI Breath Sounds: Clear, LLL Breath Sounds: Diminished  Fluids:    Intake/Output Summary (Last 24 hours) at 17 0802  Last data filed at 17 0600   Gross per 24 hour   Intake   3224 ml   Output   2030 ml   Net   1194 ml        GI/Nutrition:  Orders Placed This Encounter   Procedures   • DIET ORDER     Standing Status: Standing      Number of Occurrences: 1      Standing Expiration Date:      Order Specific Question:  Diet:     Answer:  Diabetic [3]     Medical Decision Making, by Problem:  Active Hospital Problems    Diagnosis   • Spinal stenosis, lumbar region, without neurogenic  claudication [M48.06]       Plan:  1. MObilize with PT/OT  2. Keep hemovacs today  3. Try for home tomorrow am    Core Measures

## 2017-03-11 VITALS
WEIGHT: 205.25 LBS | BODY MASS INDEX: 34.2 KG/M2 | SYSTOLIC BLOOD PRESSURE: 104 MMHG | HEIGHT: 65 IN | HEART RATE: 84 BPM | OXYGEN SATURATION: 94 % | RESPIRATION RATE: 18 BRPM | DIASTOLIC BLOOD PRESSURE: 67 MMHG | TEMPERATURE: 98.1 F

## 2017-03-11 PROCEDURE — 700111 HCHG RX REV CODE 636 W/ 250 OVERRIDE (IP): Performed by: PHYSICIAN ASSISTANT

## 2017-03-11 PROCEDURE — 700102 HCHG RX REV CODE 250 W/ 637 OVERRIDE(OP): Performed by: PHYSICIAN ASSISTANT

## 2017-03-11 PROCEDURE — A9270 NON-COVERED ITEM OR SERVICE: HCPCS | Performed by: PHYSICIAN ASSISTANT

## 2017-03-11 PROCEDURE — 700112 HCHG RX REV CODE 229: Performed by: PHYSICIAN ASSISTANT

## 2017-03-11 RX ORDER — ONDANSETRON 4 MG/1
4-8 TABLET, FILM COATED ORAL EVERY 6 HOURS PRN
Qty: 20 TAB | Refills: 0 | Status: ON HOLD | OUTPATIENT
Start: 2017-03-11 | End: 2019-12-05

## 2017-03-11 RX ADMIN — DOCUSATE SODIUM 100 MG: 100 CAPSULE ORAL at 08:12

## 2017-03-11 RX ADMIN — OXYCODONE HYDROCHLORIDE AND ACETAMINOPHEN 2 TABLET: 5; 325 TABLET ORAL at 08:12

## 2017-03-11 RX ADMIN — HYDROCHLOROTHIAZIDE 50 MG: 25 TABLET ORAL at 08:11

## 2017-03-11 RX ADMIN — ATORVASTATIN CALCIUM 20 MG: 40 TABLET, FILM COATED ORAL at 08:11

## 2017-03-11 RX ADMIN — GLIMEPIRIDE 1 MG: 2 TABLET ORAL at 08:09

## 2017-03-11 RX ADMIN — OMEPRAZOLE 20 MG: 20 CAPSULE, DELAYED RELEASE ORAL at 08:11

## 2017-03-11 RX ADMIN — LISINOPRIL 20 MG: 20 TABLET ORAL at 08:12

## 2017-03-11 RX ADMIN — ONDANSETRON 4 MG: 4 TABLET, ORALLY DISINTEGRATING ORAL at 08:18

## 2017-03-11 RX ADMIN — FLUOXETINE 10 MG: 10 CAPSULE ORAL at 08:10

## 2017-03-11 RX ADMIN — METFORMIN HYDROCHLORIDE 1000 MG: 500 TABLET, FILM COATED ORAL at 08:11

## 2017-03-11 RX ADMIN — VITAMIN D, TAB 1000IU (100/BT) 5000 UNITS: 25 TAB at 08:10

## 2017-03-11 RX ADMIN — ALLOPURINOL 100 MG: 100 TABLET ORAL at 08:11

## 2017-03-11 ASSESSMENT — LIFESTYLE VARIABLES
EVER_SMOKED: NEVER
EVER_SMOKED: NEVER

## 2017-03-11 ASSESSMENT — PAIN SCALES - GENERAL: PAINLEVEL_OUTOF10: 8

## 2017-03-11 NOTE — PROGRESS NOTES
Received report from Sandra ROA. Pt is A&Ox4. Denies n/t. Pt had a bout of nausea. Medicated per MAR w/Zofran w/positive results. Pt states back pain of 8/10. Medicated appropriately per MAR w/positive results. Pt updated on POC. All needs met at this time. Pt is instructed to call when in need of assistance. Bed in lowest and locked position. Call light within reach. Bed alarm and hourly rounding in place.

## 2017-03-11 NOTE — DISCHARGE INSTRUCTIONS
Discharge Instructions    Discharged to home by car with relative. Discharged via wheelchair, hospital escort: Yes.  Special equipment needed: LSO    Be sure to schedule a follow-up appointment with your primary care doctor or any specialists as instructed.     Discharge Plan:   Diet Plan: Discussed  Activity Level: Discussed  Confirmed Follow up Appointment: Patient to Call and Schedule Appointment  Confirmed Symptoms Management: Discussed  Medication Reconciliation Updated: Yes  Influenza Vaccine Indication: Not indicated: Previously immunized this influenza season and > 8 years of age    I understand that a diet low in cholesterol, fat, and sodium is recommended for good health. Unless I have been given specific instructions below for another diet, I accept this instruction as my diet prescription.   Other diet: Diabetic     Special Instructions: None    · Is patient discharged on Warfarin / Coumadin?   No     · Is patient Post Blood Transfusion?  No    Spinal Fusion  Spinal fusion is a procedure to make 2 or more of the bones in your spinal column (vertebrae) grow together (fuse). This procedure stops movement between the vertebrae and can relieve pain and prevent deformity.   Spinal fusion is used to treat the following conditions:  · Fractures of the spine.  · Herniated disk (the spongy material [cartilage] between the vertebrae).  · Abnormal curvatures of the spine, such as scoliosis or kyphosis.  · A weak or an unstable spine, caused by infections or tumor.  RISKS AND COMPLICATIONS  Complications associated with spinal fusion are rare, but they can occur. Possible complications include:  · Bleeding.  · Infection near the incision.  · Nerve damage. Signs of nerve damage are back pain, pain in one or both legs, weakness, or numbness.  · Spinal fluid leakage.  · Blood clot in your leg, which can move to your lungs.  · Difficulty controlling urination or bowel movements.  BEFORE THE PROCEDURE  · A medical  evaluation will be done. This will include a physical exam, blood tests, and imaging exams.  · You will talk with an anesthesiologist. This is the person who will be in charge of the anesthesia during the procedure. Spinal fusion usually requires that you are asleep during the procedure (general anesthesia).  · You will need to stop taking certain medicines, particularly those associated with an increased risk of bleeding. Ask your caregiver about changing or stopping your regular medicines.  · If you smoke, you will need to stop at least 2 weeks before the procedure. Smoking can slow down the healing process, especially fusion of the vertebrae, and increase the risk of complications.  · Do not eat or drink anything for at least 8 hours before the procedure.  PROCEDURE   A cut (incision) is made over the vertebrae that will be fused. The back muscles are  from the vertebrae. If you are having this procedure to treat a herniated disk, the disc material pressing on the nerve root is removed (decompression). The area where the disk is removed is then filled with extra bone. Bone from another part of your body (autogenous bone) or bone from a bone donor (allograft bone) may be used. The extra bone promotes fusion between the vertebrae. Sometimes, specific medicines are added to the fusion area to promote bone healing. In most cases, screws and rods or metal plates will be used to attach the vertebrae to stabilize them while they fuse.   AFTER THE PROCEDURE   · You will stay in a recovery area until the anesthesia has worn off. Your blood pressure and pulse will be checked frequently.  · You will be given antibiotics to prevent infection.  · You may continue to receive fluids through an intravenous (IV) tube while you are still in the hospital.  · Pain after surgery is normal. You will be given pain medicine.  · You will be taught how to move correctly and how to stand and walk. While in bed, you will be  "instructed to turn frequently, using a \"log rolling\" technique, in which the entire body is moved without twisting the back.     This information is not intended to replace advice given to you by your health care provider. Make sure you discuss any questions you have with your health care provider.     Document Released: 09/15/2004 Document Revised: 03/11/2013 Document Reviewed: 03/01/2012  [employer] Interactive Patient Education ©2016 [employer]    Spinal Fusion, Care After  Refer to this sheet in the next few weeks. These instructions provide you with information on caring for yourself after your procedure. Your caregiver may also give you more specific instructions. Your treatment has been planned according to current medical practices, but problems sometimes occur. Call your caregiver if you have any problems or questions after your procedure.  HOME CARE INSTRUCTIONS   · Take whatever pain medicine has been prescribed by your caregiver. Do not take over-the-counter pain medicine unless directed otherwise by your caregiver.  · Do not drive if you are taking narcotic pain medicines.  · Change your bandage (dressing) if necessary or as directed by your caregiver.  · Do not get your surgical cut (incision) wet. After a few days you may take quick showers (rather than baths), but keep your incision clean and dry. Covering the incision with plastic wrap while you shower should keep your incision dry. A few weeks after surgery, once your incision has healed and your caregiver says it is okay, you can take baths or go swimming.  · If you have been prescribed medicine to prevent your blood from clotting, follow the directions carefully.  · Check the area around your incision often. Look for redness and swelling. Also, look for anything leaking from your wound. You can use a mirror or have a family member inspect your incision if it is in a place where it is difficult for you to see.  · Ask your caregiver what " activities you should avoid and for how long.  · Walk as much as possible.  · Do not lift anything heavier than 10 pounds (4.5 kilograms) until your caregiver says it is safe.  · Do not twist or bend for a few weeks. Try not to pull on things. Avoid sitting for long periods of time. Change positions at least every hour.  · Ask your caregiver what kinds of exercise you should do to make your back stronger and when you should begin doing these exercises.  SEEK IMMEDIATE MEDICAL CARE IF:   · Pain suddenly becomes much worse.  · The incision area is red, swollen, bleeding, or leaking fluid.  · Your legs or feet become increasingly painful, numb, weak, or swollen.  · You have trouble controlling urination or bowel movements.  · You have trouble breathing.  · You have chest pain.  · You have a fever.  MAKE SURE YOU:  · Understand these instructions.  · Will watch your condition.  · Will get help right away if you are not doing well or get worse.     This information is not intended to replace advice given to you by your health care provider. Make sure you discuss any questions you have with your health care provider.     Document Released: 07/07/2006 Document Revised: 01/08/2016 Document Reviewed: 03/01/2012  [employer] Interactive Patient Education ©2016 [employer]      Lumbar Laminectomy, Care After  Refer to this sheet in the next few weeks. These instructions provide you with information on caring for yourself after your procedure. Your health care provider may also give you more specific instructions. Your treatment has been planned according to current medical practices, but problems sometimes occur. Call your health care provider if you have any problems or questions after your procedure.  HOME CARE INSTRUCTIONS   · Check the incision twice a day for signs of infection. Some signs may include a foul-smelling, greenish or yellowish discharge from the wound, increased pain, or increased redness over the  incision.  · Change your bandages about 24-36 hours after surgery, or as directed.  · You may shower once the bandage is removed, or as directed. Avoid tub baths, swimming, and hot tubs for 3 weeks or until your incision has healed completely. If you have stitches or staples, they may be removed 2-3 weeks after surgery, or as directed by your doctor.  · Daily exercise is helpful to prevent the return of problems. Walking is permitted. You may use a treadmill without an incline. Cut down on activities and exercise if you have discomfort. You may also go up and down stairs as much as you can tolerate.  · Do not lift anything heavier than 15 lb. Avoid bending or twisting at the waist. Always bend your knees.  · Maintain strength and range of motion as instructed.  · Do not drive for 2-3 weeks, or as directed by your doctor. You may be a passenger for 20-30 minutes at a time. Lying back in the passenger seat may be more comfortable for you.  · Limit your sitting to intervals of 20-30 minutes. You should lie down or walk in between sitting periods. There are no limitations for sitting in a recliner chair.  · Only take over-the-counter or prescription medicines for pain, discomfort, or fever as directed by your health care provider.  SEEK MEDICAL CARE IF:   · There is increased bleeding (more than a small spot) from the wound.  · You notice redness, swelling, or increasing pain in the wound.  · Pus is coming from the wound.  · You have a fever for more than 2-3 days.  · You notice a foul smell coming from the wound or dressing.  · You have increasing pain in your wound.  SEEK IMMEDIATE MEDICAL CARE IF:   · You develop a rash.  · You have difficulty breathing.  · You have any allergic problems.  · You develop a headache or stiff neck that does not respond to pain relievers.  · You are unable to urinate.  · You develop new onset of pain, numbness, or weakness in the buttocks or lower extremities.  MAKE SURE YOU:    · Understand these instructions.  · Will watch your condition.  · Will get help right away if you are not doing well or get worse.     This information is not intended to replace advice given to you by your health care provider. Make sure you discuss any questions you have with your health care provider.     Document Released: 11/21/2005 Document Revised: 08/20/2014 Document Reviewed: 05/15/2014  [employer] Interactive Patient Education ©2016 [employer]    Lumbar Diskectomy, Care After  Refer to this sheet in the next few weeks. These instructions provide you with information about caring for yourself after your procedure. Your health care provider may also give you more specific instructions. Your treatment has been planned according to current medical practices, but problems sometimes occur. Call your health care provider if you have any problems or questions after your procedure.  WHAT TO EXPECT AFTER THE PROCEDURE  After your procedure, it is common to have:  · Pain.  · Numbness.  · Weakness.  HOME CARE INSTRUCTIONS  Medicines  · Take medicines only as directed by your health care provider.  · If you were prescribed an antibiotic medicine, finish all of it even if you start to feel better.  Incision Care  · There are many different ways to close and cover an incision, including stitches (sutures), skin glue, and adhesive strips. Follow your health care provider's instructions about:  · Incision care.  · Bandage (dressing) changes and removal.  · Incision closure removal.  · Check your incision area every day for signs of infection. If you cannot see your incision, have someone check it for you. Watch for:  · Redness, swelling, or pain.  · Fluid, blood, or pus.   Activities  · Avoid sitting for longer than 20 minutes at a time or as directed by your health care provider.  · Do not climb stairs more than once each day until your health care provider approves.  · Do not bend at your waist. To pick things up,  bend your knees.  · Do not lift anything that is heavier than 10 lb (4.5 kg) or as directed by your health care provider.  · Do not drive a car until your health care provider approves.  · Ask your health care provider when you may return to your normal activities, such as playing sports and going back to work.  · Work with your physical therapist to learn safe movement and exercises to help healing. Do these exercises as directed.  · Take short walks often.  General Instructions  · Do not use any tobacco products, including cigarettes, chewing tobacco, or electronic cigarettes. If you need help quitting, ask your health care provider.  · Follow your health care provider's instructions about bathing. Do not take baths, shower, swim, or use a hot tub until your health care provider approves.  · Wear your back brace as directed by your health care provider.  · To prevent constipation:  ¨ Drink enough fluid to keep your urine clear or pale yellow.  ¨ Eat plenty of fruits, vegetables, and whole grains.  · Keep all follow-up visits as directed by your health care provider. This is important. This includes any follow-up visits with your physical therapist.  SEEK MEDICAL CARE IF:  · You have a fever.  · You have redness, swelling, or pain in your incision area.  · Your pain is not controlled with medicine.  · You have pain, numbness, or weakness that lasts longer than three weeks after surgery.  · You become constipated.  SEEK IMMEDIATE MEDICAL CARE IF:  · You have fluid, blood, or pus coming from your incision.  · You have increasing pain, numbness, or weakness.  · You lose control of when you urinate or have a bowel movement (incontinence).  · You have chest pain.  · You have trouble breathing.     This information is not intended to replace advice given to you by your health care provider. Make sure you discuss any questions you have with your health care provider.     Document Released: 11/22/2005 Document Revised:  01/08/2016 Document Reviewed: 08/12/2015  [employer] Interactive Patient Education ©2016 [employer]       Depression / Suicide Risk    As [facility] are [facility], it is important to learn how to keep safe from harming yourself.    Recognize the warning signs:  · Abrupt changes in personality, positive or negative- including increase in energy   · Giving away possessions  · Change in eating patterns- significant weight changes-  positive or negative  · Change in sleeping patterns- unable to sleep or sleeping all the time   · Unwillingness or inability to communicate  · Depression  · Unusual sadness, discouragement and loneliness  · Talk of wanting to die  · Neglect of personal appearance   · Rebelliousness- reckless behavior  · Withdrawal from people/activities they love  · Confusion- inability to concentrate     If you or a loved one observes any of these behaviors or has concerns about self-harm, here's what you can do:  · Talk about it- your feelings and reasons for harming yourself  · Remove any means that you might use to hurt yourself (examples: pills, rope, extension cords, firearm)  · Get professional help from the community (Mental Health, Substance Abuse, psychological counseling)  · Do not be alone:Call your Safe Contact- someone whom you trust who will be there for you.  · Call your local CRISIS HOTLINE 722-1825 or 415-452-3927  · Call your local Children's Mobile Crisis Response Team [place] (062) 756-3194 or www.[employer]  · Call the toll free National Suicide Prevention Hotlines   · National Suicide Prevention Lifeline 078-709-WHQQ (7572)  · National Hope Line Network 800-SUICIDE (799-9552)

## 2017-03-11 NOTE — PROGRESS NOTES
Received report from Rosemarie ROA. Pt is A&Ox4. Denies any n/v or n/t. States back pain of 4/10. Medicated appropriately per MAR w/positive results. Pt updated on POC. All needs met at this time. Pt is instructed to call when in need of assistance. Bed in lowest and locked position. Call light within reach. Bed alarm and hourly rounding in place.

## 2017-03-11 NOTE — PROGRESS NOTES
Progress Note               Author: Sunny Quinonez Date & Time created: 3/11/2017  9:40 AM     Interval History:  NSX  POD #3  Pain controlled w/ orals  Mobilizing  Voiding  Eating and Drinking   No N/V  Hemovacs 50 and 5cc    Review of Systems:  ROS    Physical Exam:  Physical Exam   Wound C/D/I  LE motor 5/5 throughout bilaterally  Labs stable  VSS    Labs:        Invalid input(s): [id number]      Recent Labs      173  03/10/17   0224   SODIUM  134*  139   POTASSIUM  4.1  4.0   CHLORIDE  103  105   CO2  25  27   BUN  21  25*   CREATININE  1.00  0.93   CALCIUM  9.7  9.9     Recent Labs      17   0203  03/10/17   022   GLUCOSE  235*  135*     Recent Labs      17   1336  03/09/17   0203  03/10/17   0224   RBC  3.98*  3.98*  3.43*   HEMOGLOBIN  12.3  12.4  10.7*   HEMATOCRIT  36.6*  37.1  31.8*   PLATELETCT  90*  102*  110*     Recent Labs      176  03/09/17   0203  03/10/17   0224   WBC  3.8*  6.7  6.5     Hemodynamics:  Temp (24hrs), Av.8 °C (98.2 °F), Min:36.6 °C (97.9 °F), Max:37 °C (98.6 °F)  Temperature: 36.7 °C (98.1 °F)  Pulse  Av.4  Min: 59  Max: 87   Blood Pressure : 104/67 mmHg     Respiratory:    Respiration: 18, Pulse Oximetry: 94 %        RUL Breath Sounds: Clear, RML Breath Sounds: Clear, RLL Breath Sounds: Diminished, CITLALLI Breath Sounds: Clear, LLL Breath Sounds: Diminished  Fluids:    Intake/Output Summary (Last 24 hours) at 17 0802  Last data filed at 17 0600   Gross per 24 hour   Intake   3224 ml   Output   2030 ml   Net   1194 ml        GI/Nutrition:  Orders Placed This Encounter   Procedures   • DIET ORDER     Standing Status: Standing      Number of Occurrences: 1      Standing Expiration Date:      Order Specific Question:  Diet:     Answer:  Diabetic [3]     Medical Decision Making, by Problem:  Active Hospital Problems    Diagnosis   • Spinal stenosis, lumbar region, without neurogenic claudication [M48.06]       Plan:  1. D/C  hemovacs now  2. On Q Dc'd  3. D.C home today    Core Measures

## 2017-03-11 NOTE — PROGRESS NOTES
Discharge instructions provided and reviewed w/pt and daughter-in-law. All questions answered. Pt left via wheelchair with hospital escort. All belongings taken.

## 2017-03-12 NOTE — DISCHARGE SUMMARY
DISCHARGE DIAGNOSES:  1.  Status post L3-S1 decompressive laminectomy.  2.  L4-L5 posterior lumbar fusion.  3.  L4-L5 degenerative spondylolisthesis with spinal stenosis at L3-L4, L4-L5   and L5-S1.    OPERATION PERFORMED:  See above.    REASON FOR ADMISSION:  The patient is a pleasant 66-year-old female who has   been followed by our office.  She has been showing with right-sided back pain   that would be mechanical.  She would get worse pain when she stand and walk.    She would get pain if lying down the leg with ambulation.  She also had   numbness quality this in the left thigh and pain in the posterior aspect of   her right thigh.  She underwent multiple conservative therapies with   persistent symptoms.  She had an MRI scan, which showed the above levels of   stenosis.  Due to these findings with persistent symptoms, she was offered the   above-mentioned procedure and she did consent.    HOSPITAL COURSE:  The patient underwent the above operation without any   complications and was transferred to the neurosciences floor.  On   postoperative day #1, her pain was controlled with IV analgesia and switched   to p.o. anesthetics.  She began to mobilize and her Richardson was discontinued and   she was voiding.  On postoperative day #2, she begun progressed with her   mobility.  Her Hemovac drains were not ready to be discontinued and was kept   another night.  On postoperative postoperative day #3, the Hemovac drains had   dried up nicely.  She is eating, drinking, mobilizing, and voiding.  At that   time, it was determined she met the criteria for discharge and was discharged   home in stable condition.    DISCHARGE INSTRUCTIONS:  She is to be extremely cautious with any bending,   flexing, or twisting about the low back.  She is not to lift, push, or pull   any objects greater than 10 pounds.  She is to avoid nonsteroidal   anti-inflammatory medications.  She has followup appointments with our office   in 2 and 6  weeks' time and has been instructed to keep these.  She may shower   at this time, but she is not to submerge her wound until further instructed.    DISCHARGE MEDICATIONS:  1.  Percocet 5/325.  2.  Zofran 4 mg.  3.  Robaxin 750 mg.  4.  Keflex 500 mg.    I once again have answered all of her questions to the best of my ability.    She is now again discharged home in stable condition.       ____________________________________     JOSE DAWKINS / SYBIL    DD:  03/11/2017 09:44:19  DT:  03/11/2017 19:20:35    D#:  [id number]  Job#:  [id number]    cc: Jade COBOS DO

## 2017-03-15 ENCOUNTER — PATIENT OUTREACH (OUTPATIENT)
Dept: HEALTH INFORMATION MANAGEMENT | Facility: OTHER | Age: 67
End: 2017-03-15

## 2017-03-22 ENCOUNTER — PATIENT OUTREACH (OUTPATIENT)
Dept: HEALTH INFORMATION MANAGEMENT | Facility: OTHER | Age: 67
End: 2017-03-22

## 2017-04-06 ENCOUNTER — PATIENT OUTREACH (OUTPATIENT)
Dept: HEALTH INFORMATION MANAGEMENT | Facility: OTHER | Age: 67
End: 2017-04-06

## 2017-04-06 NOTE — Clinical Note
April 6, 2017        Olga Lockhart  [address]  [facility] [address]        Dear Olga:    My name is Shira and I am a Nurse Care Coordinator with [facility] that follows up with everyone that comes in [facility] from the [facility].      I have been trying to reach you by telephone to follow up with you after your hospital stay and see if there was anything you needed and check and see how you are doing since being home.    If you would like additional assistance or information regarding your healthcare, managing your health care challenges or connecting with community resources, please contact Shira at [phone].    If you have any questions or concerns, please don't hesitate to call.        Sincerely,    Shira Ramirez RN BSN   Care Coordination   [facility] [place]  [address]  P: [phone]   [email or web address]    Electronically Signed

## 2017-04-06 NOTE — PROGRESS NOTES
Message left with name and number 3rd time for [place] care coordination.     After three unsuccessful telephone attempts patient has not returned call to CC to discuss care coordination management. Letter sent to address on file.     Closed case secondary to lack of engagement on part of patient.

## 2017-04-21 ENCOUNTER — HOSPITAL ENCOUNTER (OUTPATIENT)
Dept: RADIOLOGY | Facility: MEDICAL CENTER | Age: 67
End: 2017-04-21
Attending: PHYSICIAN ASSISTANT
Payer: MEDICARE

## 2017-04-21 DIAGNOSIS — M43.10 SPONDYLOLISTHESIS, GRADE 1: ICD-10-CM

## 2017-04-21 PROCEDURE — 72100 X-RAY EXAM L-S SPINE 2/3 VWS: CPT

## 2017-05-30 ENCOUNTER — HOSPITAL ENCOUNTER (OUTPATIENT)
Dept: RADIOLOGY | Facility: MEDICAL CENTER | Age: 67
End: 2017-05-30
Attending: PHYSICIAN ASSISTANT
Payer: MEDICARE

## 2017-05-30 DIAGNOSIS — M43.10 SPONDYLOLISTHESIS, UNSPECIFIED SPINAL REGION: ICD-10-CM

## 2017-05-30 PROCEDURE — 72110 X-RAY EXAM L-2 SPINE 4/>VWS: CPT

## 2018-07-13 ENCOUNTER — HOSPITAL ENCOUNTER (OUTPATIENT)
Dept: RADIOLOGY | Facility: REHABILITATION | Age: 68
End: 2018-07-13
Attending: PHYSICAL MEDICINE & REHABILITATION
Payer: MEDICARE

## 2018-07-13 ENCOUNTER — HOSPITAL ENCOUNTER (OUTPATIENT)
Dept: PAIN MANAGEMENT | Facility: REHABILITATION | Age: 68
End: 2018-07-13
Attending: PHYSICAL MEDICINE & REHABILITATION
Payer: MEDICARE

## 2018-07-13 VITALS
OXYGEN SATURATION: 96 % | TEMPERATURE: 98.8 F | RESPIRATION RATE: 15 BRPM | SYSTOLIC BLOOD PRESSURE: 118 MMHG | BODY MASS INDEX: 31.59 KG/M2 | HEART RATE: 70 BPM | WEIGHT: 189.6 LBS | DIASTOLIC BLOOD PRESSURE: 80 MMHG | HEIGHT: 65 IN

## 2018-07-13 PROCEDURE — 700111 HCHG RX REV CODE 636 W/ 250 OVERRIDE (IP)

## 2018-07-13 PROCEDURE — 64493 INJ PARAVERT F JNT L/S 1 LEV: CPT

## 2018-07-13 PROCEDURE — 700117 HCHG RX CONTRAST REV CODE 255

## 2018-07-13 PROCEDURE — 700101 HCHG RX REV CODE 250

## 2018-07-13 PROCEDURE — 64494 INJ PARAVERT F JNT L/S 2 LEV: CPT

## 2018-07-13 RX ORDER — LIDOCAINE HYDROCHLORIDE 10 MG/ML
INJECTION, SOLUTION EPIDURAL; INFILTRATION; INTRACAUDAL; PERINEURAL
Status: COMPLETED
Start: 2018-07-13 | End: 2018-07-13

## 2018-07-13 RX ORDER — LIDOCAINE HYDROCHLORIDE 20 MG/ML
INJECTION, SOLUTION EPIDURAL; INFILTRATION; INTRACAUDAL; PERINEURAL
Status: COMPLETED
Start: 2018-07-13 | End: 2018-07-13

## 2018-07-13 RX ORDER — METHYLPREDNISOLONE ACETATE 80 MG/ML
INJECTION, SUSPENSION INTRA-ARTICULAR; INTRALESIONAL; INTRAMUSCULAR; SOFT TISSUE
Status: COMPLETED
Start: 2018-07-13 | End: 2018-07-13

## 2018-07-13 RX ORDER — BUPIVACAINE HYDROCHLORIDE 5 MG/ML
INJECTION, SOLUTION EPIDURAL; INTRACAUDAL
Status: COMPLETED
Start: 2018-07-13 | End: 2018-07-13

## 2018-07-13 RX ORDER — DEXAMETHASONE SODIUM PHOSPHATE 10 MG/ML
INJECTION, SOLUTION INTRAMUSCULAR; INTRAVENOUS
Status: COMPLETED
Start: 2018-07-13 | End: 2018-07-13

## 2018-07-13 RX ADMIN — LIDOCAINE HYDROCHLORIDE 5 ML: 10 INJECTION, SOLUTION EPIDURAL; INFILTRATION; INTRACAUDAL; PERINEURAL at 12:30

## 2018-07-13 RX ADMIN — LIDOCAINE HYDROCHLORIDE 5 ML: 20 INJECTION, SOLUTION EPIDURAL; INFILTRATION; INTRACAUDAL; PERINEURAL at 12:30

## 2018-07-13 RX ADMIN — IOHEXOL 3 ML: 240 INJECTION, SOLUTION INTRATHECAL; INTRAVASCULAR; INTRAVENOUS; ORAL at 12:30

## 2018-07-13 ASSESSMENT — PAIN SCALES - GENERAL
PAINLEVEL_OUTOF10: 0
PAINLEVEL_OUTOF10: 5

## 2018-07-13 NOTE — PROGRESS NOTES
Med reconciliation completed. Pt has . Patient denies taking any blood thinners, antibiotics, &/or non steroidal anti inflammatories. Discharge instructions reviewed & copy given to patient.

## 2018-07-13 NOTE — PROGRESS NOTES
.Fluids tolerated well. Ice compress applied to the affected area. Reviewed home care instructions and understood by patient.Patient right leg is numbed and unable to walk independently. Dr. Link notified and evaluated the patient.

## 2018-07-13 NOTE — PROCEDURES
Lumbar Medial Branch Blocks**     LEVEL(S): L4, L5 medial branches (corresponding to the L5-S1 facet joints, respectively)  SIDE: BILATERAL     PRE PROCEDURE DIAGNOSIS: Lumbar spondylosis  POST PROCEDURE DIAGNOSIS: Same  PHYSICIAN: Karl Link MD  ANESTHESIA: Local  The patient was interviewed and the medical record reviewed.  There were no medical, pharmacologic, radiographic or other structural contraindications to attempting fluoroscopically guided lumbar medial branch blocks.  Risks and expected side effects (including, but not limited to, potential for failure of the procedure or worsening of pain, bleeding, infection, nerve injury, systemic reaction to anesthetic, anaphylactic shock, and seizure) as well as potential benefit of the procedure were reviewed with the patient and all concerns addressed.  The printed consent form was signed and witnessed. A standard time-out procedure was performed.  DESCRIPTION OF PROCEDURE:    The patient was placed in the prone position on the fluoroscopy table. The skin entry points for approaching the anatomic target points of the segmental medial branches of the above documented levels were identified with fluoroscopy  and marked.  Following thorough Chlorhexidine preparation of the skin, sterile draping, and infiltration of the skin entry points and subcutaneous tissues with 1% lidocaine, a 22 gauge spinal needle was placed under fluoroscopic guidance down on to the junction of the superior articular process and transverse process of L5 on the above documented side(s), adjacent to the corresponding medial branches. In addition, a 22 gauge spinal needle was placed under fluoroscopic guidance down on to the sacral ala(s) on the above documented side(s), adjacent to the corresponding L5 dorsal ramus. Needle positioning was confirmed in A/P, oblique and lateral views. After negative aspiration for blood and CSF, 0.25 ml of Omnipaque 240 was injected with clear spread over the  targets without evidence of intravascular uptake. 0.5 ml 2% lidocaine was then injected at each medial branch target site.     Follow up plans and appointments were discussed. The patient was instructed to keep careful note of how the usual pain was modified by these injections. After having met discharge criteria, the patient was discharged from the office.  Comments: No complications

## 2018-07-27 ENCOUNTER — HOSPITAL ENCOUNTER (OUTPATIENT)
Dept: RADIOLOGY | Facility: REHABILITATION | Age: 68
End: 2018-07-27
Attending: PHYSICAL MEDICINE & REHABILITATION
Payer: MEDICARE

## 2018-07-27 ENCOUNTER — HOSPITAL ENCOUNTER (OUTPATIENT)
Dept: PAIN MANAGEMENT | Facility: REHABILITATION | Age: 68
End: 2018-07-27
Attending: PHYSICAL MEDICINE & REHABILITATION
Payer: MEDICARE

## 2018-07-27 VITALS
RESPIRATION RATE: 16 BRPM | OXYGEN SATURATION: 95 % | BODY MASS INDEX: 31.55 KG/M2 | HEIGHT: 65 IN | DIASTOLIC BLOOD PRESSURE: 81 MMHG | WEIGHT: 189.38 LBS | TEMPERATURE: 97.8 F | SYSTOLIC BLOOD PRESSURE: 121 MMHG | HEART RATE: 85 BPM

## 2018-07-27 PROCEDURE — 64635 DESTROY LUMB/SAC FACET JNT: CPT

## 2018-07-27 PROCEDURE — 700111 HCHG RX REV CODE 636 W/ 250 OVERRIDE (IP)

## 2018-07-27 PROCEDURE — 99152 MOD SED SAME PHYS/QHP 5/>YRS: CPT

## 2018-07-27 RX ORDER — MIDAZOLAM HYDROCHLORIDE 1 MG/ML
INJECTION INTRAMUSCULAR; INTRAVENOUS
Status: COMPLETED
Start: 2018-07-27 | End: 2018-07-27

## 2018-07-27 RX ORDER — LIDOCAINE HYDROCHLORIDE 10 MG/ML
INJECTION, SOLUTION EPIDURAL; INFILTRATION; INTRACAUDAL; PERINEURAL
Status: COMPLETED
Start: 2018-07-27 | End: 2018-07-27

## 2018-07-27 RX ORDER — BETAMETHASONE SODIUM PHOSPHATE AND BETAMETHASONE ACETATE 3; 3 MG/ML; MG/ML
INJECTION, SUSPENSION INTRA-ARTICULAR; INTRALESIONAL; INTRAMUSCULAR; SOFT TISSUE
Status: COMPLETED
Start: 2018-07-27 | End: 2018-07-27

## 2018-07-27 RX ADMIN — MIDAZOLAM HYDROCHLORIDE 1 MG: 1 INJECTION, SOLUTION INTRAMUSCULAR; INTRAVENOUS at 14:16

## 2018-07-27 RX ADMIN — FENTANYL CITRATE 50 MCG: 50 INJECTION, SOLUTION INTRAMUSCULAR; INTRAVENOUS at 14:17

## 2018-07-27 ASSESSMENT — PAIN SCALES - GENERAL
PAINLEVEL_OUTOF10: 9
PAINLEVEL_OUTOF10: 6
PAINLEVEL_OUTOF10: 6

## 2018-07-27 NOTE — PROCEDURES
LUMBAR RADIOFREQUENCY ABLATION    LEVEL(S): L4, L5-DR medial branches (corresponding to the L5-S1 facet joints, respectively)  SIDE: LEFT     PRE-PROCEDURE DIAGNOSIS: Lumbar spondylosis  POST-PROCEDURE DIAGNOSIS: Same  PHYSICIAN: Karl Link MD  ANESTHESIA: 1 mg Versed, 50 mcg Fentanyl IV    The patient was interviewed and the medical record reviewed.  There were no medical, pharmacologic, radiographic or other structural contraindications to attempting fluoroscopically guided radiofrequency ablation.  Risks and expected side effects (including, but not limited to, potential for failure of the procedure or worsened pain, bleeding, infection, nerve injury, systemic reaction to anesthetic, anaphylactic shock, seizure, spinal cord injury) as well as potential benefit of the procedure were reviewed with the patient and all concerns addressed.  The printed consent form was signed and witnessed. A standard time-out procedure was performed.    DESCRIPTION OF PROCEDURE:    The patient was placed in the prone position on the fluoroscopy table.  The skin entry points for approaching the anatomic target of the segmental medial branches of the above documented levels were identified with fluoroscopic guidance. Following thorough Chlorhexidine preparation of the skin and draping and 1% Lidocaine infiltration of the skin entry points and subcutaneous tissues, a single 18 gauge curved 10 cm 10mm active tip radiofrequency cannula was placed at the junction of the superior articular process and transverse process of L5, adjacent to the corresponding medial branches on the above documented side(s). Position was confirmed in A/P, oblique and lateral views.    Then, a single 18 gauge curved 10 cm 10mm active tip radiofrequency cannula was placed at the sacral ala, adjacent to the corresponding L5 dorsal ramus on the above documented side(s). Position was confirmed in A/P, oblique and lateral views.   Each placement was stimulated at  50 Hz and up to 0.5 volts in attempt to reproduce some component of the patient's usual pain.  If this response was accomplished, the cannula was left in place.  If it could not be accomplished after multiple attempts, the cannula was placed at what was felt to be the closest anatomic approximation to the segmental medial branch.  Each placement was stimulated at 2 Hz and up to 3 volts without any evidence of distal myotomal stimulation.  At each placement a continuous mode radiofrequency treatment was done at 80 degrees C for 90 seconds after injecting 0.5 ml of 1% lidocaine adjacent to each medial branch.    Follow up plans and appointments as well as post-procedure instructions were discussed. After having met discharge criteria, the patient was discharged in good condition.    Comments: No complications

## 2018-07-27 NOTE — PROGRESS NOTES
Current medications reviewed with pt, see medications reconciliation form. Pt arleth taking ASA or other blood thinners or anti-inflammatories.  Pt has a ride post-procedure(Rosangela to drive).  Printed and verbal discharge instructions given to pt who verbalized understanding.

## 2018-07-27 NOTE — PROGRESS NOTES
Pt positioned pre-procedure by RN, CNA, XRAY. Pillow placed under feet for support. Grounding pad to right calf. Lactated ringers IV tko

## 2018-08-10 ENCOUNTER — HOSPITAL ENCOUNTER (OUTPATIENT)
Dept: PAIN MANAGEMENT | Facility: REHABILITATION | Age: 68
End: 2018-08-10
Attending: PHYSICAL MEDICINE & REHABILITATION
Payer: MEDICARE

## 2018-08-10 ENCOUNTER — HOSPITAL ENCOUNTER (OUTPATIENT)
Dept: RADIOLOGY | Facility: REHABILITATION | Age: 68
End: 2018-08-10
Attending: PHYSICAL MEDICINE & REHABILITATION
Payer: MEDICARE

## 2018-08-10 VITALS
TEMPERATURE: 98.5 F | SYSTOLIC BLOOD PRESSURE: 132 MMHG | HEIGHT: 65 IN | BODY MASS INDEX: 31.85 KG/M2 | RESPIRATION RATE: 18 BRPM | WEIGHT: 191.14 LBS | HEART RATE: 66 BPM | OXYGEN SATURATION: 96 % | DIASTOLIC BLOOD PRESSURE: 75 MMHG

## 2018-08-10 PROCEDURE — 99152 MOD SED SAME PHYS/QHP 5/>YRS: CPT

## 2018-08-10 PROCEDURE — 700111 HCHG RX REV CODE 636 W/ 250 OVERRIDE (IP)

## 2018-08-10 PROCEDURE — 64635 DESTROY LUMB/SAC FACET JNT: CPT

## 2018-08-10 PROCEDURE — 64636 DESTROY L/S FACET JNT ADDL: CPT

## 2018-08-10 RX ORDER — BETAMETHASONE SODIUM PHOSPHATE AND BETAMETHASONE ACETATE 3; 3 MG/ML; MG/ML
INJECTION, SUSPENSION INTRA-ARTICULAR; INTRALESIONAL; INTRAMUSCULAR; SOFT TISSUE
Status: COMPLETED
Start: 2018-08-10 | End: 2018-08-10

## 2018-08-10 RX ORDER — LIDOCAINE HYDROCHLORIDE 10 MG/ML
INJECTION, SOLUTION EPIDURAL; INFILTRATION; INTRACAUDAL; PERINEURAL
Status: COMPLETED
Start: 2018-08-10 | End: 2018-08-10

## 2018-08-10 RX ORDER — MIDAZOLAM HYDROCHLORIDE 1 MG/ML
INJECTION INTRAMUSCULAR; INTRAVENOUS
Status: COMPLETED
Start: 2018-08-10 | End: 2018-08-10

## 2018-08-10 RX ADMIN — MIDAZOLAM HYDROCHLORIDE 1 MG: 1 INJECTION, SOLUTION INTRAMUSCULAR; INTRAVENOUS at 12:46

## 2018-08-10 RX ADMIN — FENTANYL CITRATE 50 MCG: 50 INJECTION, SOLUTION INTRAMUSCULAR; INTRAVENOUS at 12:46

## 2018-08-10 ASSESSMENT — PAIN SCALES - GENERAL
PAINLEVEL_OUTOF10: 0
PAINLEVEL_OUTOF10: 2
PAINLEVEL_OUTOF10: 0

## 2018-08-10 NOTE — PROGRESS NOTES
Received ambulatory accompanied by RN.  Patient awake, alert and verbally responsive. Tolerated fluids well.  Ice pack applied to affected area. Patient able to  move all extremities without difficulty voluntarily and on command. Reviewed home care instructions and understood by patient.

## 2018-08-10 NOTE — PROCEDURES
LEVEL(S): L4, L5-DR medial branches (corresponding to the L5-S1 facet joints, respectively)  SIDE: RIGHT     PRE-PROCEDURE DIAGNOSIS: Lumbar spondylosis  POST-PROCEDURE DIAGNOSIS: Same  PHYSICIAN: Karl Link MD  ANESTHESIA: 1 mg Versed, 50 mcg Fentanyl IV  The patient was interviewed and the medical record reviewed.  There were no medical, pharmacologic, radiographic or other structural contraindications to attempting fluoroscopically guided radiofrequency ablation.  Risks and expected side effects (including, but not limited to, potential for failure of the procedure or worsened pain, bleeding, infection, nerve injury, systemic reaction to anesthetic, anaphylactic shock, seizure, spinal cord injury) as well as potential benefit of the procedure were reviewed with the patient and all concerns addressed.  The printed consent form was signed and witnessed. A standard time-out procedure was performed.  DESCRIPTION OF PROCEDURE:    The patient was placed in the prone position on the fluoroscopy table.  The skin entry points for approaching the anatomic target of the segmental medial branches of the above documented levels were identified with fluoroscopic guidance. Following thorough Chlorhexidine preparation of the skin and draping and 1% Lidocaine infiltration of the skin entry points and subcutaneous tissues, a single 18 gauge curved 10 cm 10mm active tip radiofrequency cannula was placed at the junction of the superior articular process and transverse process of L5, adjacent to the corresponding medial branches on the above documented side(s). Position was confirmed in A/P, oblique and lateral views.    Then, a single 18 gauge curved 10 cm 10mm active tip radiofrequency cannula was placed at the sacral ala, adjacent to the corresponding L5 dorsal ramus on the above documented side(s). Position was confirmed in A/P, oblique and lateral views.   Each placement was stimulated at 50 Hz and up to 0.5 volts in attempt  to reproduce some component of the patient's usual pain.  If this response was accomplished, the cannula was left in place.  If it could not be accomplished after multiple attempts, the cannula was placed at what was felt to be the closest anatomic approximation to the segmental medial branch.  Each placement was stimulated at 2 Hz and up to 3 volts without any evidence of distal myotomal stimulation.  At each placement a continuous mode radiofrequency treatment was done at 80 degrees C for 90 seconds after injecting 0.5 ml of 1% lidocaine adjacent to each medial branch.  Follow up plans and appointments as well as post-procedure instructions were discussed. After having met discharge criteria, the patient was discharged in good condition.  Comments: No complications

## 2018-08-10 NOTE — PROGRESS NOTES
Medication reconciliation reviewed with patient. Denied taking any blood thinners and any  any anti- inflammatories medications. Patient had a  Rosangela / daughter .Home care form and verbal  instruction given to patient and verbalized understanding. Dr. Link made aware . Hand off reported to REJI Velasco RN.

## 2019-12-03 ENCOUNTER — APPOINTMENT (OUTPATIENT)
Dept: RADIOLOGY | Facility: MEDICAL CENTER | Age: 69
DRG: 552 | End: 2019-12-03
Attending: EMERGENCY MEDICINE
Payer: COMMERCIAL

## 2019-12-03 ENCOUNTER — HOSPITAL ENCOUNTER (INPATIENT)
Facility: MEDICAL CENTER | Age: 69
LOS: 2 days | DRG: 552 | End: 2019-12-05
Attending: EMERGENCY MEDICINE | Admitting: SURGERY
Payer: COMMERCIAL

## 2019-12-03 ENCOUNTER — APPOINTMENT (OUTPATIENT)
Dept: RADIOLOGY | Facility: MEDICAL CENTER | Age: 69
DRG: 552 | End: 2019-12-03
Payer: COMMERCIAL

## 2019-12-03 DIAGNOSIS — T14.90XA TRAUMA: ICD-10-CM

## 2019-12-03 DIAGNOSIS — S01.01XA SCALP LACERATION, INITIAL ENCOUNTER: ICD-10-CM

## 2019-12-03 DIAGNOSIS — S12.401A CLOSED NONDISPLACED FRACTURE OF FIFTH CERVICAL VERTEBRA, UNSPECIFIED FRACTURE MORPHOLOGY, INITIAL ENCOUNTER (HCC): ICD-10-CM

## 2019-12-03 PROBLEM — I10 HYPERTENSION: Status: ACTIVE | Noted: 2019-12-03

## 2019-12-03 PROBLEM — E78.5 HYPERLIPIDEMIA: Status: ACTIVE | Noted: 2019-12-03

## 2019-12-03 PROBLEM — S12.400A FRACTURE OF FIFTH CERVICAL VERTEBRA (HCC): Status: ACTIVE | Noted: 2019-12-03

## 2019-12-03 PROBLEM — E11.9 TYPE 2 DIABETES MELLITUS (HCC): Status: ACTIVE | Noted: 2019-12-03

## 2019-12-03 LAB
ABO GROUP BLD: NORMAL
ALBUMIN SERPL BCP-MCNC: 3.7 G/DL (ref 3.2–4.9)
ALBUMIN/GLOB SERPL: 1.6 G/DL
ALP SERPL-CCNC: 65 U/L (ref 30–99)
ALT SERPL-CCNC: 22 U/L (ref 2–50)
ANION GAP SERPL CALC-SCNC: 7 MMOL/L (ref 0–11.9)
APTT PPP: 26.8 SEC (ref 24.7–36)
AST SERPL-CCNC: 20 U/L (ref 12–45)
BILIRUB SERPL-MCNC: 1.8 MG/DL (ref 0.1–1.5)
BLD GP AB SCN SERPL QL: NORMAL
BUN SERPL-MCNC: 24 MG/DL (ref 8–22)
CALCIUM SERPL-MCNC: 9.8 MG/DL (ref 8.5–10.5)
CHLORIDE SERPL-SCNC: 105 MMOL/L (ref 96–112)
CO2 SERPL-SCNC: 25 MMOL/L (ref 20–33)
CREAT SERPL-MCNC: 0.99 MG/DL (ref 0.5–1.4)
ERYTHROCYTE [DISTWIDTH] IN BLOOD BY AUTOMATED COUNT: 51.2 FL (ref 35.9–50)
ETHANOL BLD-MCNC: 0 G/DL
GLOBULIN SER CALC-MCNC: 2.3 G/DL (ref 1.9–3.5)
GLUCOSE SERPL-MCNC: 205 MG/DL (ref 65–99)
HCG SERPL QL: NEGATIVE
HCT VFR BLD AUTO: 36.3 % (ref 37–47)
HGB BLD-MCNC: 11.8 G/DL (ref 12–16)
INR PPP: 1.13 (ref 0.87–1.13)
MCH RBC QN AUTO: 30.1 PG (ref 27–33)
MCHC RBC AUTO-ENTMCNC: 32.5 G/DL (ref 33.6–35)
MCV RBC AUTO: 92.6 FL (ref 81.4–97.8)
PLATELET # BLD AUTO: 82 K/UL (ref 164–446)
PMV BLD AUTO: 10.4 FL (ref 9–12.9)
POTASSIUM SERPL-SCNC: 4 MMOL/L (ref 3.6–5.5)
PROT SERPL-MCNC: 6 G/DL (ref 6–8.2)
PROTHROMBIN TIME: 14.8 SEC (ref 12–14.6)
RBC # BLD AUTO: 3.92 M/UL (ref 4.2–5.4)
RH BLD: NORMAL
SODIUM SERPL-SCNC: 137 MMOL/L (ref 135–145)
WBC # BLD AUTO: 8.1 K/UL (ref 4.8–10.8)

## 2019-12-03 PROCEDURE — 305308 HCHG STAPLER,SKIN,DISP.

## 2019-12-03 PROCEDURE — 71045 X-RAY EXAM CHEST 1 VIEW: CPT

## 2019-12-03 PROCEDURE — 0HQ0XZZ REPAIR SCALP SKIN, EXTERNAL APPROACH: ICD-10-PCS | Performed by: EMERGENCY MEDICINE

## 2019-12-03 PROCEDURE — 99285 EMERGENCY DEPT VISIT HI MDM: CPT

## 2019-12-03 PROCEDURE — 700111 HCHG RX REV CODE 636 W/ 250 OVERRIDE (IP): Performed by: EMERGENCY MEDICINE

## 2019-12-03 PROCEDURE — 86900 BLOOD TYPING SEROLOGIC ABO: CPT

## 2019-12-03 PROCEDURE — 303747 HCHG EXTRA SUTURE

## 2019-12-03 PROCEDURE — 305948 HCHG GREEN TRAUMA ACT PRE-NOTIFY NO CC

## 2019-12-03 PROCEDURE — 80053 COMPREHEN METABOLIC PANEL: CPT

## 2019-12-03 PROCEDURE — 86850 RBC ANTIBODY SCREEN: CPT

## 2019-12-03 PROCEDURE — 96375 TX/PRO/DX INJ NEW DRUG ADDON: CPT

## 2019-12-03 PROCEDURE — 85730 THROMBOPLASTIN TIME PARTIAL: CPT

## 2019-12-03 PROCEDURE — 304217 HCHG IRRIGATION SYSTEM

## 2019-12-03 PROCEDURE — 0HQ1XZZ REPAIR FACE SKIN, EXTERNAL APPROACH: ICD-10-PCS | Performed by: EMERGENCY MEDICINE

## 2019-12-03 PROCEDURE — 96374 THER/PROPH/DIAG INJ IV PUSH: CPT

## 2019-12-03 PROCEDURE — 84703 CHORIONIC GONADOTROPIN ASSAY: CPT

## 2019-12-03 PROCEDURE — 700101 HCHG RX REV CODE 250

## 2019-12-03 PROCEDURE — 304999 HCHG REPAIR-SIMPLE/INTERMED LEVEL 1

## 2019-12-03 PROCEDURE — 85027 COMPLETE CBC AUTOMATED: CPT

## 2019-12-03 PROCEDURE — 770006 HCHG ROOM/CARE - MED/SURG/GYN SEMI*

## 2019-12-03 PROCEDURE — 80307 DRUG TEST PRSMV CHEM ANLYZR: CPT

## 2019-12-03 PROCEDURE — 86901 BLOOD TYPING SEROLOGIC RH(D): CPT

## 2019-12-03 PROCEDURE — 85610 PROTHROMBIN TIME: CPT

## 2019-12-03 PROCEDURE — 72125 CT NECK SPINE W/O DYE: CPT

## 2019-12-03 RX ORDER — MORPHINE SULFATE 4 MG/ML
4 INJECTION, SOLUTION INTRAMUSCULAR; INTRAVENOUS ONCE
Status: COMPLETED | OUTPATIENT
Start: 2019-12-03 | End: 2019-12-03

## 2019-12-03 RX ORDER — ONDANSETRON 2 MG/ML
4 INJECTION INTRAMUSCULAR; INTRAVENOUS ONCE
Status: COMPLETED | OUTPATIENT
Start: 2019-12-03 | End: 2019-12-03

## 2019-12-03 RX ORDER — LIDOCAINE HYDROCHLORIDE 20 MG/ML
20 INJECTION, SOLUTION INFILTRATION; PERINEURAL ONCE
Status: COMPLETED | OUTPATIENT
Start: 2019-12-03 | End: 2019-12-03

## 2019-12-03 RX ORDER — LIDOCAINE HYDROCHLORIDE 20 MG/ML
INJECTION, SOLUTION INFILTRATION; PERINEURAL
Status: COMPLETED
Start: 2019-12-03 | End: 2019-12-03

## 2019-12-03 RX ADMIN — LIDOCAINE HYDROCHLORIDE 20 ML: 20 INJECTION, SOLUTION INFILTRATION; PERINEURAL at 21:00

## 2019-12-03 RX ADMIN — MORPHINE SULFATE 4 MG: 4 INJECTION INTRAVENOUS at 23:08

## 2019-12-03 RX ADMIN — ONDANSETRON 4 MG: 2 INJECTION INTRAMUSCULAR; INTRAVENOUS at 23:08

## 2019-12-03 ASSESSMENT — LIFESTYLE VARIABLES: DO YOU DRINK ALCOHOL: NO

## 2019-12-04 ENCOUNTER — HOSPITAL ENCOUNTER (OUTPATIENT)
Dept: RADIOLOGY | Facility: MEDICAL CENTER | Age: 69
End: 2019-12-04

## 2019-12-04 LAB
GLUCOSE BLD-MCNC: 150 MG/DL (ref 65–99)
GLUCOSE BLD-MCNC: 237 MG/DL (ref 65–99)
GLUCOSE BLD-MCNC: 250 MG/DL (ref 65–99)

## 2019-12-04 PROCEDURE — 700112 HCHG RX REV CODE 229: Performed by: NURSE PRACTITIONER

## 2019-12-04 PROCEDURE — 97161 PT EVAL LOW COMPLEX 20 MIN: CPT

## 2019-12-04 PROCEDURE — 700105 HCHG RX REV CODE 258: Performed by: NURSE PRACTITIONER

## 2019-12-04 PROCEDURE — 92523 SPEECH SOUND LANG COMPREHEN: CPT

## 2019-12-04 PROCEDURE — 99358 PROLONG SERVICE W/O CONTACT: CPT | Performed by: PHYSICAL MEDICINE & REHABILITATION

## 2019-12-04 PROCEDURE — 700111 HCHG RX REV CODE 636 W/ 250 OVERRIDE (IP): Performed by: NURSE PRACTITIONER

## 2019-12-04 PROCEDURE — A9270 NON-COVERED ITEM OR SERVICE: HCPCS | Performed by: NURSE PRACTITIONER

## 2019-12-04 PROCEDURE — 700102 HCHG RX REV CODE 250 W/ 637 OVERRIDE(OP): Performed by: NURSE PRACTITIONER

## 2019-12-04 PROCEDURE — 82962 GLUCOSE BLOOD TEST: CPT | Mod: 91

## 2019-12-04 PROCEDURE — 770006 HCHG ROOM/CARE - MED/SURG/GYN SEMI*

## 2019-12-04 PROCEDURE — 97535 SELF CARE MNGMENT TRAINING: CPT

## 2019-12-04 RX ORDER — AMOXICILLIN 250 MG
1 CAPSULE ORAL
Status: DISCONTINUED | OUTPATIENT
Start: 2019-12-04 | End: 2019-12-05 | Stop reason: HOSPADM

## 2019-12-04 RX ORDER — ONDANSETRON 2 MG/ML
4 INJECTION INTRAMUSCULAR; INTRAVENOUS EVERY 4 HOURS PRN
Status: DISCONTINUED | OUTPATIENT
Start: 2019-12-04 | End: 2019-12-05 | Stop reason: HOSPADM

## 2019-12-04 RX ORDER — OXYCODONE HYDROCHLORIDE 5 MG/1
5 TABLET ORAL
Status: DISCONTINUED | OUTPATIENT
Start: 2019-12-04 | End: 2019-12-05 | Stop reason: HOSPADM

## 2019-12-04 RX ORDER — DOCUSATE SODIUM 100 MG/1
100 CAPSULE, LIQUID FILLED ORAL 2 TIMES DAILY
Status: DISCONTINUED | OUTPATIENT
Start: 2019-12-04 | End: 2019-12-05 | Stop reason: HOSPADM

## 2019-12-04 RX ORDER — ACETAMINOPHEN 500 MG
1000 TABLET ORAL EVERY 6 HOURS
Status: DISCONTINUED | OUTPATIENT
Start: 2019-12-04 | End: 2019-12-05 | Stop reason: HOSPADM

## 2019-12-04 RX ORDER — BISACODYL 10 MG
10 SUPPOSITORY, RECTAL RECTAL
Status: DISCONTINUED | OUTPATIENT
Start: 2019-12-04 | End: 2019-12-05 | Stop reason: HOSPADM

## 2019-12-04 RX ORDER — CELECOXIB 200 MG/1
200 CAPSULE ORAL 2 TIMES DAILY
Status: DISCONTINUED | OUTPATIENT
Start: 2019-12-04 | End: 2019-12-05 | Stop reason: HOSPADM

## 2019-12-04 RX ORDER — POLYETHYLENE GLYCOL 3350 17 G/17G
1 POWDER, FOR SOLUTION ORAL 2 TIMES DAILY
Status: DISCONTINUED | OUTPATIENT
Start: 2019-12-04 | End: 2019-12-05 | Stop reason: HOSPADM

## 2019-12-04 RX ORDER — OXYCODONE HYDROCHLORIDE 10 MG/1
10 TABLET ORAL
Status: DISCONTINUED | OUTPATIENT
Start: 2019-12-04 | End: 2019-12-05 | Stop reason: HOSPADM

## 2019-12-04 RX ORDER — AMOXICILLIN 250 MG
1 CAPSULE ORAL NIGHTLY
Status: DISCONTINUED | OUTPATIENT
Start: 2019-12-04 | End: 2019-12-05 | Stop reason: HOSPADM

## 2019-12-04 RX ORDER — ENEMA 19; 7 G/133ML; G/133ML
1 ENEMA RECTAL
Status: DISCONTINUED | OUTPATIENT
Start: 2019-12-04 | End: 2019-12-05 | Stop reason: HOSPADM

## 2019-12-04 RX ORDER — MORPHINE SULFATE 4 MG/ML
4 INJECTION, SOLUTION INTRAMUSCULAR; INTRAVENOUS
Status: DISCONTINUED | OUTPATIENT
Start: 2019-12-04 | End: 2019-12-05 | Stop reason: HOSPADM

## 2019-12-04 RX ORDER — SODIUM CHLORIDE, SODIUM LACTATE, POTASSIUM CHLORIDE, CALCIUM CHLORIDE 600; 310; 30; 20 MG/100ML; MG/100ML; MG/100ML; MG/100ML
INJECTION, SOLUTION INTRAVENOUS CONTINUOUS
Status: DISCONTINUED | OUTPATIENT
Start: 2019-12-04 | End: 2019-12-05 | Stop reason: HOSPADM

## 2019-12-04 RX ADMIN — ACETAMINOPHEN 1000 MG: 500 TABLET ORAL at 00:32

## 2019-12-04 RX ADMIN — DOCUSATE SODIUM 100 MG: 100 CAPSULE, LIQUID FILLED ORAL at 18:17

## 2019-12-04 RX ADMIN — ACETAMINOPHEN 1000 MG: 500 TABLET ORAL at 12:19

## 2019-12-04 RX ADMIN — INSULIN HUMAN 2 UNITS: 100 INJECTION, SOLUTION PARENTERAL at 12:20

## 2019-12-04 RX ADMIN — SODIUM CHLORIDE, POTASSIUM CHLORIDE, SODIUM LACTATE AND CALCIUM CHLORIDE: 600; 310; 30; 20 INJECTION, SOLUTION INTRAVENOUS at 01:08

## 2019-12-04 RX ADMIN — OXYCODONE HYDROCHLORIDE 5 MG: 5 TABLET ORAL at 12:19

## 2019-12-04 RX ADMIN — ACETAMINOPHEN 1000 MG: 500 TABLET ORAL at 18:17

## 2019-12-04 RX ADMIN — ENOXAPARIN SODIUM 30 MG: 100 INJECTION SUBCUTANEOUS at 06:01

## 2019-12-04 RX ADMIN — POLYETHYLENE GLYCOL 3350 1 PACKET: 17 POWDER, FOR SOLUTION ORAL at 18:16

## 2019-12-04 RX ADMIN — INSULIN HUMAN 2 UNITS: 100 INJECTION, SOLUTION PARENTERAL at 20:54

## 2019-12-04 RX ADMIN — ENOXAPARIN SODIUM 30 MG: 100 INJECTION SUBCUTANEOUS at 18:16

## 2019-12-04 RX ADMIN — ACETAMINOPHEN 1000 MG: 500 TABLET ORAL at 06:01

## 2019-12-04 RX ADMIN — MORPHINE SULFATE 4 MG: 4 INJECTION INTRAVENOUS at 02:24

## 2019-12-04 RX ADMIN — CELECOXIB 200 MG: 200 CAPSULE ORAL at 06:01

## 2019-12-04 RX ADMIN — CELECOXIB 200 MG: 200 CAPSULE ORAL at 18:22

## 2019-12-04 RX ADMIN — SODIUM CHLORIDE, POTASSIUM CHLORIDE, SODIUM LACTATE AND CALCIUM CHLORIDE: 600; 310; 30; 20 INJECTION, SOLUTION INTRAVENOUS at 20:50

## 2019-12-04 RX ADMIN — OXYCODONE HYDROCHLORIDE 10 MG: 10 TABLET ORAL at 18:22

## 2019-12-04 ASSESSMENT — PATIENT HEALTH QUESTIONNAIRE - PHQ9
2. FEELING DOWN, DEPRESSED, IRRITABLE, OR HOPELESS: NOT AT ALL
SUM OF ALL RESPONSES TO PHQ9 QUESTIONS 1 AND 2: 0
1. LITTLE INTEREST OR PLEASURE IN DOING THINGS: NOT AT ALL

## 2019-12-04 ASSESSMENT — COGNITIVE AND FUNCTIONAL STATUS - GENERAL
MOBILITY SCORE: 10
CLIMB 3 TO 5 STEPS WITH RAILING: TOTAL
DRESSING REGULAR UPPER BODY CLOTHING: A LOT
MOVING FROM LYING ON BACK TO SITTING ON SIDE OF FLAT BED: A LOT
MOVING FROM LYING ON BACK TO SITTING ON SIDE OF FLAT BED: UNABLE
MOVING TO AND FROM BED TO CHAIR: UNABLE
HELP NEEDED FOR BATHING: A LOT
DAILY ACTIVITIY SCORE: 13
WALKING IN HOSPITAL ROOM: A LOT
TOILETING: A LOT
TURNING FROM BACK TO SIDE WHILE IN FLAT BAD: A LOT
SUGGESTED CMS G CODE MODIFIER MOBILITY: CL
DRESSING REGULAR LOWER BODY CLOTHING: A LOT
SUGGESTED CMS G CODE MODIFIER DAILY ACTIVITY: CL
WALKING IN HOSPITAL ROOM: A LITTLE
CLIMB 3 TO 5 STEPS WITH RAILING: A LOT
PERSONAL GROOMING: A LOT
STANDING UP FROM CHAIR USING ARMS: A LOT
TURNING FROM BACK TO SIDE WHILE IN FLAT BAD: UNABLE
STANDING UP FROM CHAIR USING ARMS: A LITTLE
MOBILITY SCORE: 12
MOVING TO AND FROM BED TO CHAIR: A LOT
SUGGESTED CMS G CODE MODIFIER MOBILITY: CL
EATING MEALS: A LITTLE

## 2019-12-04 ASSESSMENT — LIFESTYLE VARIABLES
AVERAGE NUMBER OF DAYS PER WEEK YOU HAVE A DRINK CONTAINING ALCOHOL: 0
TOTAL SCORE: 0
SUBSTANCE_ABUSE: 0
CONSUMPTION TOTAL: NEGATIVE
EVER HAD A DRINK FIRST THING IN THE MORNING TO STEADY YOUR NERVES TO GET RID OF A HANGOVER: NO
DOES PATIENT WANT TO STOP DRINKING: NO
HOW MANY TIMES IN THE PAST YEAR HAVE YOU HAD 5 OR MORE DRINKS IN A DAY: 0
HAVE PEOPLE ANNOYED YOU BY CRITICIZING YOUR DRINKING: NO
TOTAL SCORE: 0
HAVE YOU EVER FELT YOU SHOULD CUT DOWN ON YOUR DRINKING: NO
ON A TYPICAL DAY WHEN YOU DRINK ALCOHOL HOW MANY DRINKS DO YOU HAVE: 0
EVER_SMOKED: NEVER
TOTAL SCORE: 0
EVER FELT BAD OR GUILTY ABOUT YOUR DRINKING: NO
EVER_SMOKED: NEVER
ALCOHOL_USE: NO

## 2019-12-04 ASSESSMENT — ENCOUNTER SYMPTOMS
MYALGIAS: 1
NECK PAIN: 1
SHORTNESS OF BREATH: 0
NAUSEA: 0
FEVER: 0
SENSORY CHANGE: 0
SPEECH CHANGE: 0
DOUBLE VISION: 0
ABDOMINAL PAIN: 0

## 2019-12-04 ASSESSMENT — GAIT ASSESSMENTS
DEVIATION: BRADYKINETIC;DECREASED HEEL STRIKE;DECREASED TOE OFF
ASSISTIVE DEVICE: FRONT WHEEL WALKER
GAIT LEVEL OF ASSIST: SUPERVISED
DISTANCE (FEET): 25

## 2019-12-04 ASSESSMENT — COPD QUESTIONNAIRES
HAVE YOU SMOKED AT LEAST 100 CIGARETTES IN YOUR ENTIRE LIFE: NO/DON'T KNOW
COPD SCREENING SCORE: 2
DO YOU EVER COUGH UP ANY MUCUS OR PHLEGM?: NO/ONLY WITH OCCASIONAL COLDS OR INFECTIONS
DURING THE PAST 4 WEEKS HOW MUCH DID YOU FEEL SHORT OF BREATH: NONE/LITTLE OF THE TIME
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
## 2019-12-04 NOTE — PROGRESS NOTES
Pt seen / examined.  Consult dictated.  OK DC home when meeting criteria and I will F/U as outpatient.

## 2019-12-04 NOTE — PROGRESS NOTES
Pt A/Ox4, c/o neck pain with movement. PRN meds given. VSS on 1L NC. Purewick and SCDs in place. Fall precautions in place. Call light within reach.     2 rn skin check    Pt with multiple lacerations to forehead and scalp noted.  Laceration to right forehead extending to back of head around scalp noted. Second laceration to left parietal area noted. Sutures and staples CDI to forehead noted. Staples to L parietal area CDI.   Old scar to mid abdomen.  Generalized abrasions noted.

## 2019-12-04 NOTE — PROGRESS NOTES
Aspen collar applied to pt at bedside. Spinal precautions maintained during application. Any further questions or assistance please call ortho tech at [phone].

## 2019-12-04 NOTE — PROGRESS NOTES
"/65   Pulse 79   Temp 36.4 °C (97.6 °F) (Temporal)   Resp 15   Ht 1.626 m (5' 4\")   Wt 79.9 kg (176 lb 2.4 oz)   SpO2 98%     Cognitive evaluation order placed at request from rehab.  "

## 2019-12-04 NOTE — ASSESSMENT & PLAN NOTE
Acute fracture through the anterior osteophytes at C5-C6, which extends into the left side of the C5 vertebral body anteriorly.  No significant displacement of fracture fragments.  12/5 Upright films  Non-operative management.  Cervical collar and follow up with Dr. Kristen Peterson MD. Neurosurgery.

## 2019-12-04 NOTE — PROGRESS NOTES
[place] j cervical collar has been delivered to pt's bed side to be fitted to when needed and ready.
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
## 2019-12-04 NOTE — PROGRESS NOTES
Case d/w Dr Medellin.  CT reviewed:  anterio osteophyte Fx at C5-6 w/o posterior element involvement or subluxation.  This is a stable Fx.  Rec Hard C Collar with outpt f/u..

## 2019-12-04 NOTE — ASSESSMENT & PLAN NOTE
Chronic condition treated with lisinopril and hydrochlorothiazide.  Resume maintenance lisinopril.  Hold HCTZ.

## 2019-12-04 NOTE — CARE PLAN
Problem: Safety  Goal: Will remain free from injury  Outcome: PROGRESSING AS EXPECTED     Problem: Knowledge Deficit  Goal: Knowledge of disease process/condition, treatment plan, diagnostic tests, and medications will improve  Outcome: PROGRESSING AS EXPECTED     Problem: Pain Management  Goal: Pain level will decrease to patient's comfort goal  Outcome: PROGRESSING AS EXPECTED
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
## 2019-12-04 NOTE — ASSESSMENT & PLAN NOTE
Large scalp laceration  Suture and staple repair completed in Emergency Department  Remove in 7-10 days (12/10-12/13)

## 2019-12-04 NOTE — PROGRESS NOTES
"TRAUMA TERTIARY SURVEY     Mental status adequate for full examination?: Yes    Spine cleared (radiologically and/or clinically): No    PHYSICAL EXAMINATION:  Vitals: /65   Pulse 79   Temp 36.4 °C (97.6 °F) (Temporal)   Resp 15   Ht 1.626 m (5' 4\")   Wt 79.9 kg (176 lb 2.4 oz)   LMP  (LMP Unknown)   SpO2 98%   BMI 30.24 kg/m²   Constitutional:     General Appearance: appears stated age.  HEENT:    cranial lacerations, stapled in ER. The pupils are equal, round, and reactive to light bilaterally. The extraocular muscles are intact bilaterally. The nares and oropharynx are clear. The midface and jaw are stable. No malocclusion is evident.  Neck:    The cervical spine is immobilized with a hard collar.  Respiratory:   Inspection: Unlabored respirations, no intercostal retractions, paradoxical motion, or accessory muscle use.   Palpation:  The chest is nontender. The clavicles are non deformed bilaterally.   Auscultation: clear to auscultation.  Cardiovascular:   Auscultation: regular rate and rhythm.   Peripheral Pulses: Normal.   Abdomen:   Abdomen is soft, nontender, without organomegaly or masses.  Genitourinary:   (FEMALE):   Musculoskeletal:   The pelvis is stable.  No significant angulation, deformity, or soft tissue injury involving the upper and lower extremities. Normal range of motion.   Back:   The thoracolumbar spine was examined. Examination is remarkable for no significant tenderness, swelling, or deformity in the thoracolumbar region.  Skin:   The skin is warm and dry.  Neurologic:    [place] Coma Scale (GCS) 15 [id number]. Neurologic examination revealed no focal deficits noted.  Psychiatric:   The patient does not appear depressed or anxious.    IMAGING:  CT-CSPINE WITHOUT PLUS RECONS   Final Result      Large anterior bridging osteophytes consistent with DISH.      Fracture of the anterior osteophyte at C5-6.      DX-CHEST-LIMITED (1 VIEW)   Final Result         No acute cardiac or pulmonary " abnormality is identified.        All current laboratory studies/radiology exams reviewed: Yes    Completed Consultations:  Dr. Peterson - Neurosurgery     Pending Consultations:  None     Newly Identified Diagnoses and Injuries:  No further findings    TOTAL RAP SCORE:  RAP Score Total: 10      ETOH Screening  CAGE Score: 0  Assessment complete date: 12/4/2019 (BA 0.0)

## 2019-12-04 NOTE — THERAPY
"Speech Language Therapy Evaluation completed to address speech, communication and cognition    Functional Status: Patient was seen on this date for a cognitive-linguistic evaluation. C-collar in place. Patient restless and easily distracted with her cell phone. Phone was placed out of line of view for evaluation. Per pt, she works 2 days a week as a . She lives at home by herself and was independent with IADLs prior to hospital admission. Initiated the Cognitive Linguistic Quick Test (CLQT); however, patient became sleepy half way through evaluation stating, \"I don't think I can get through this. I am dozing off.\" Patient scored the following on sub tests that were administered: personal facts (8=WNL), symbol cancellation (0=sev), clock drawing (7=mod-sev), story retell (6=sev), auditory comprehension (total score 1=mod-sev), and symbol trails (5=sev). Patient closing eyes and unable to complete symbol trails task; therefore session was concluded.     Recommendations: Cognitive evaluation initiated. However, could not complete due to reduced HUGO and/or impaired attention to task. Patient will likely require initial direct supervision due to same. SLP following to continue cognitive testing as patient is able.     Plan of Care: Will benefit from Speech Therapy 5 times per week    Post-Acute Therapy: Recommend inpatient transitional care services for continued speech therapy services.      See \"Rehab Therapy-Acute\" Patient Summary Report for complete documentation. Thank you for the consult.           "

## 2019-12-04 NOTE — CONSULTS
DATE OF SERVICE:  12/04/2019    ADDENDUM    IMPRESSION:  Anterior osteophyte fracture at C5-C6, most likely stable.    Attempted to have the patient flex and extend at the bedside.  Her neck was   too stiff to do that.  From my standpoint in time, the patient can be   discharged home when she is meeting criteria,  Once her neck pain subsides, we   can obtain lateral flexion and extension x-rays, and once those are obtained   and cleared, she can come out of her hard collar.  I have asked the nurses to   get her a [place] collar for now and use the current collar, she is in for the   shower.       ____________________________________     MD KHOI VIRGEN / SYBIL    DD:  12/04/2019 09:59:23  DT:  12/04/2019 10:17:17    D#:  [id number]  Job#:  [id number]
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
## 2019-12-04 NOTE — ED NOTES
Aspen collar placed by CRISPIN walters assisted in holding cervical spine to maintain spinal motion restriction.

## 2019-12-04 NOTE — PROGRESS NOTES
Bedside report received from night Rn. Plan of care updated with patient and family. O2 1 L. Patient is resting comfortably. Bed is in low position, 2 rails up and call light is in reach. Instructed to call for assistance. Will continue to monitor.

## 2019-12-04 NOTE — ASSESSMENT & PLAN NOTE
Chronic condition treated with Glyburide and Metformin.  Insulin sliding scale during acute hospitalization  Hold maintenance medications
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
## 2019-12-04 NOTE — CARE PLAN
Problem: Venous Thromboembolism (VTW)/Deep Vein Thrombosis (DVT) Prevention:  Goal: Patient will participate in Venous Thrombosis (VTE)/Deep Vein Thrombosis (DVT)Prevention Measures  Outcome: PROGRESSING AS EXPECTED  SCDs in place.      Problem: Knowledge Deficit  Goal: Knowledge of disease process/condition, treatment plan, diagnostic tests, and medications will improve  Outcome: PROGRESSING AS EXPECTED  Reviewing plan of care, activities, and medication with patient.  Encouraging patient to ask questions and participate in plan of care.  Providing answers to all questions.  Continuing with current plan of care.  Hourly rounding in practice.       Problem: Pain Management  Goal: Pain level will decrease to patient's comfort goal  Outcome: PROGRESSING AS EXPECTED  Assessing pain level frequently using 0 to 10 scale.  Providing medication per MAR.  Providing non-pharmacological intervention and therapeutic communication.

## 2019-12-05 ENCOUNTER — HOSPITAL ENCOUNTER (INPATIENT)
Facility: REHABILITATION | Age: 69
LOS: 14 days | DRG: 949 | End: 2019-12-19
Attending: PHYSICAL MEDICINE & REHABILITATION | Admitting: PHYSICAL MEDICINE & REHABILITATION
Payer: COMMERCIAL

## 2019-12-05 VITALS
DIASTOLIC BLOOD PRESSURE: 56 MMHG | RESPIRATION RATE: 20 BRPM | WEIGHT: 176.15 LBS | TEMPERATURE: 98.2 F | HEART RATE: 78 BPM | SYSTOLIC BLOOD PRESSURE: 112 MMHG | OXYGEN SATURATION: 94 % | BODY MASS INDEX: 30.07 KG/M2 | HEIGHT: 64 IN

## 2019-12-05 DIAGNOSIS — S12.401A CLOSED NONDISPLACED FRACTURE OF FIFTH CERVICAL VERTEBRA, UNSPECIFIED FRACTURE MORPHOLOGY, INITIAL ENCOUNTER (HCC): ICD-10-CM

## 2019-12-05 DIAGNOSIS — D61.818 PANCYTOPENIA (HCC): ICD-10-CM

## 2019-12-05 PROBLEM — Z02.9 DISCHARGE PLANNING ISSUES: Status: ACTIVE | Noted: 2019-12-05

## 2019-12-05 PROBLEM — S06.9XAA MILD TBI (HCC): Status: ACTIVE | Noted: 2019-12-05

## 2019-12-05 PROBLEM — Z01.89 ENCOUNTER FOR GERIATRIC ASSESSMENT: Status: ACTIVE | Noted: 2019-12-05

## 2019-12-05 LAB
ALBUMIN SERPL BCP-MCNC: 3.1 G/DL (ref 3.2–4.9)
ALBUMIN/GLOB SERPL: 1.5 G/DL
ALP SERPL-CCNC: 53 U/L (ref 30–99)
ALT SERPL-CCNC: 18 U/L (ref 2–50)
ANION GAP SERPL CALC-SCNC: 4 MMOL/L (ref 0–11.9)
AST SERPL-CCNC: 15 U/L (ref 12–45)
BASOPHILS # BLD AUTO: 0.7 % (ref 0–1.8)
BASOPHILS # BLD: 0.02 K/UL (ref 0–0.12)
BILIRUB SERPL-MCNC: 1.3 MG/DL (ref 0.1–1.5)
BUN SERPL-MCNC: 27 MG/DL (ref 8–22)
CALCIUM SERPL-MCNC: 9.4 MG/DL (ref 8.5–10.5)
CHLORIDE SERPL-SCNC: 105 MMOL/L (ref 96–112)
CO2 SERPL-SCNC: 27 MMOL/L (ref 20–33)
CREAT SERPL-MCNC: 0.97 MG/DL (ref 0.5–1.4)
EOSINOPHIL # BLD AUTO: 0.08 K/UL (ref 0–0.51)
EOSINOPHIL NFR BLD: 2.7 % (ref 0–6.9)
ERYTHROCYTE [DISTWIDTH] IN BLOOD BY AUTOMATED COUNT: 52.5 FL (ref 35.9–50)
GLOBULIN SER CALC-MCNC: 2.1 G/DL (ref 1.9–3.5)
GLUCOSE BLD-MCNC: 156 MG/DL (ref 65–99)
GLUCOSE BLD-MCNC: 184 MG/DL (ref 65–99)
GLUCOSE BLD-MCNC: 198 MG/DL (ref 65–99)
GLUCOSE SERPL-MCNC: 175 MG/DL (ref 65–99)
HCT VFR BLD AUTO: 29.8 % (ref 37–47)
HGB BLD-MCNC: 9.5 G/DL (ref 12–16)
IMM GRANULOCYTES # BLD AUTO: 0.01 K/UL (ref 0–0.11)
IMM GRANULOCYTES NFR BLD AUTO: 0.3 % (ref 0–0.9)
LYMPHOCYTES # BLD AUTO: 1 K/UL (ref 1–4.8)
LYMPHOCYTES NFR BLD: 33.4 % (ref 22–41)
MAGNESIUM SERPL-MCNC: 1.6 MG/DL (ref 1.5–2.5)
MCH RBC QN AUTO: 29.6 PG (ref 27–33)
MCHC RBC AUTO-ENTMCNC: 31.9 G/DL (ref 33.6–35)
MCV RBC AUTO: 92.8 FL (ref 81.4–97.8)
MONOCYTES # BLD AUTO: 0.23 K/UL (ref 0–0.85)
MONOCYTES NFR BLD AUTO: 7.7 % (ref 0–13.4)
NEUTROPHILS # BLD AUTO: 1.65 K/UL (ref 2–7.15)
NEUTROPHILS NFR BLD: 55.2 % (ref 44–72)
NRBC # BLD AUTO: 0 K/UL
NRBC BLD-RTO: 0 /100 WBC
PHOSPHATE SERPL-MCNC: 3 MG/DL (ref 2.5–4.5)
PLATELET # BLD AUTO: 68 K/UL (ref 164–446)
PMV BLD AUTO: 10.4 FL (ref 9–12.9)
POTASSIUM SERPL-SCNC: 4.3 MMOL/L (ref 3.6–5.5)
PROT SERPL-MCNC: 5.2 G/DL (ref 6–8.2)
RBC # BLD AUTO: 3.18 M/UL (ref 4.2–5.4)
SODIUM SERPL-SCNC: 136 MMOL/L (ref 135–145)
WBC # BLD AUTO: 3 K/UL (ref 4.8–10.8)

## 2019-12-05 PROCEDURE — 97116 GAIT TRAINING THERAPY: CPT

## 2019-12-05 PROCEDURE — 84100 ASSAY OF PHOSPHORUS: CPT

## 2019-12-05 PROCEDURE — 700112 HCHG RX REV CODE 229: Performed by: NURSE PRACTITIONER

## 2019-12-05 PROCEDURE — 99223 1ST HOSP IP/OBS HIGH 75: CPT | Mod: AI | Performed by: PHYSICAL MEDICINE & REHABILITATION

## 2019-12-05 PROCEDURE — 82962 GLUCOSE BLOOD TEST: CPT | Mod: 91

## 2019-12-05 PROCEDURE — 97530 THERAPEUTIC ACTIVITIES: CPT

## 2019-12-05 PROCEDURE — 700102 HCHG RX REV CODE 250 W/ 637 OVERRIDE(OP)

## 2019-12-05 PROCEDURE — 36415 COLL VENOUS BLD VENIPUNCTURE: CPT

## 2019-12-05 PROCEDURE — 97165 OT EVAL LOW COMPLEX 30 MIN: CPT

## 2019-12-05 PROCEDURE — 94760 N-INVAS EAR/PLS OXIMETRY 1: CPT

## 2019-12-05 PROCEDURE — 770010 HCHG ROOM/CARE - REHAB SEMI PRIVAT*

## 2019-12-05 PROCEDURE — 700102 HCHG RX REV CODE 250 W/ 637 OVERRIDE(OP): Performed by: NURSE PRACTITIONER

## 2019-12-05 PROCEDURE — 80053 COMPREHEN METABOLIC PANEL: CPT

## 2019-12-05 PROCEDURE — 700111 HCHG RX REV CODE 636 W/ 250 OVERRIDE (IP): Performed by: NURSE PRACTITIONER

## 2019-12-05 PROCEDURE — A9270 NON-COVERED ITEM OR SERVICE: HCPCS | Performed by: NURSE PRACTITIONER

## 2019-12-05 PROCEDURE — 700102 HCHG RX REV CODE 250 W/ 637 OVERRIDE(OP): Performed by: PHYSICAL MEDICINE & REHABILITATION

## 2019-12-05 PROCEDURE — A9270 NON-COVERED ITEM OR SERVICE: HCPCS

## 2019-12-05 PROCEDURE — 3E02340 INTRODUCTION OF INFLUENZA VACCINE INTO MUSCLE, PERCUTANEOUS APPROACH: ICD-10-PCS | Performed by: INTERNAL MEDICINE

## 2019-12-05 PROCEDURE — 90471 IMMUNIZATION ADMIN: CPT

## 2019-12-05 PROCEDURE — 700111 HCHG RX REV CODE 636 W/ 250 OVERRIDE (IP): Performed by: SURGERY

## 2019-12-05 PROCEDURE — A9270 NON-COVERED ITEM OR SERVICE: HCPCS | Performed by: PHYSICAL MEDICINE & REHABILITATION

## 2019-12-05 PROCEDURE — 85025 COMPLETE CBC W/AUTO DIFF WBC: CPT

## 2019-12-05 PROCEDURE — 90662 IIV NO PRSV INCREASED AG IM: CPT | Performed by: SURGERY

## 2019-12-05 PROCEDURE — 83735 ASSAY OF MAGNESIUM: CPT

## 2019-12-05 RX ORDER — ACETAMINOPHEN 325 MG/1
650 TABLET ORAL EVERY 4 HOURS PRN
Status: DISCONTINUED | OUTPATIENT
Start: 2019-12-05 | End: 2019-12-19 | Stop reason: HOSPADM

## 2019-12-05 RX ORDER — CYCLOBENZAPRINE HCL 10 MG
10 TABLET ORAL 3 TIMES DAILY
Status: DISCONTINUED | OUTPATIENT
Start: 2019-12-05 | End: 2019-12-12

## 2019-12-05 RX ORDER — AMOXICILLIN 250 MG
2 CAPSULE ORAL 2 TIMES DAILY
Status: DISCONTINUED | OUTPATIENT
Start: 2019-12-05 | End: 2019-12-11

## 2019-12-05 RX ORDER — BISACODYL 10 MG
10 SUPPOSITORY, RECTAL RECTAL
Status: DISCONTINUED | OUTPATIENT
Start: 2019-12-05 | End: 2019-12-11

## 2019-12-05 RX ORDER — LIDOCAINE 50 MG/G
2 PATCH TOPICAL EVERY 24 HOURS
Status: DISCONTINUED | OUTPATIENT
Start: 2019-12-06 | End: 2019-12-19 | Stop reason: HOSPADM

## 2019-12-05 RX ORDER — CELECOXIB 200 MG/1
200 CAPSULE ORAL 2 TIMES DAILY
Status: CANCELLED | OUTPATIENT
Start: 2019-12-05 | End: 2019-12-09

## 2019-12-05 RX ORDER — DOCUSATE SODIUM 100 MG/1
100 CAPSULE, LIQUID FILLED ORAL 2 TIMES DAILY
Status: DISCONTINUED | OUTPATIENT
Start: 2019-12-05 | End: 2019-12-05

## 2019-12-05 RX ORDER — OMEPRAZOLE 20 MG/1
20 CAPSULE, DELAYED RELEASE ORAL DAILY
Status: DISCONTINUED | OUTPATIENT
Start: 2019-12-06 | End: 2019-12-11

## 2019-12-05 RX ORDER — ALUMINA, MAGNESIA, AND SIMETHICONE 2400; 2400; 240 MG/30ML; MG/30ML; MG/30ML
20 SUSPENSION ORAL
Status: DISCONTINUED | OUTPATIENT
Start: 2019-12-05 | End: 2019-12-19 | Stop reason: HOSPADM

## 2019-12-05 RX ORDER — OMEPRAZOLE 20 MG/1
20 CAPSULE, DELAYED RELEASE ORAL DAILY
Status: CANCELLED | OUTPATIENT
Start: 2019-12-06

## 2019-12-05 RX ORDER — POLYETHYLENE GLYCOL 3350 17 G/17G
1 POWDER, FOR SOLUTION ORAL
Status: DISCONTINUED | OUTPATIENT
Start: 2019-12-05 | End: 2019-12-11

## 2019-12-05 RX ORDER — TRAZODONE HYDROCHLORIDE 50 MG/1
50 TABLET ORAL
Status: DISCONTINUED | OUTPATIENT
Start: 2019-12-05 | End: 2019-12-19 | Stop reason: HOSPADM

## 2019-12-05 RX ORDER — ACETAMINOPHEN 500 MG
1000 TABLET ORAL EVERY 6 HOURS
Status: DISPENSED | OUTPATIENT
Start: 2019-12-05 | End: 2019-12-08

## 2019-12-05 RX ORDER — DOCUSATE SODIUM 100 MG/1
100 CAPSULE, LIQUID FILLED ORAL 2 TIMES DAILY
Status: CANCELLED | OUTPATIENT
Start: 2019-12-05

## 2019-12-05 RX ORDER — OXYCODONE HYDROCHLORIDE 5 MG/1
5 TABLET ORAL EVERY 6 HOURS PRN
Qty: 12 TAB | Refills: 0 | Status: ON HOLD
Start: 2019-12-05 | End: 2019-12-19

## 2019-12-05 RX ORDER — CELECOXIB 200 MG/1
200 CAPSULE ORAL 2 TIMES DAILY
Qty: 60 CAP | Status: ON HOLD
Start: 2019-12-05 | End: 2019-12-19

## 2019-12-05 RX ORDER — ECHINACEA PURPUREA EXTRACT 125 MG
2 TABLET ORAL PRN
Status: DISCONTINUED | OUTPATIENT
Start: 2019-12-05 | End: 2019-12-19 | Stop reason: HOSPADM

## 2019-12-05 RX ORDER — FLUOXETINE 20 MG/1
10 TABLET, FILM COATED ORAL DAILY
Status: DISCONTINUED | OUTPATIENT
Start: 2019-12-06 | End: 2019-12-19 | Stop reason: HOSPADM

## 2019-12-05 RX ORDER — LISINOPRIL 20 MG/1
20 TABLET ORAL DAILY
Status: CANCELLED | OUTPATIENT
Start: 2019-12-06

## 2019-12-05 RX ORDER — ONDANSETRON 2 MG/ML
4 INJECTION INTRAMUSCULAR; INTRAVENOUS 4 TIMES DAILY PRN
Status: DISCONTINUED | OUTPATIENT
Start: 2019-12-05 | End: 2019-12-19 | Stop reason: HOSPADM

## 2019-12-05 RX ORDER — ATORVASTATIN CALCIUM 20 MG/1
20 TABLET, FILM COATED ORAL DAILY
Status: DISCONTINUED | OUTPATIENT
Start: 2019-12-05 | End: 2019-12-05 | Stop reason: HOSPADM

## 2019-12-05 RX ORDER — FLUOXETINE 10 MG/1
10 CAPSULE ORAL DAILY
Status: DISCONTINUED | OUTPATIENT
Start: 2019-12-05 | End: 2019-12-05 | Stop reason: HOSPADM

## 2019-12-05 RX ORDER — ACETAMINOPHEN 500 MG
1000 TABLET ORAL EVERY 6 HOURS
Status: CANCELLED | OUTPATIENT
Start: 2019-12-05 | End: 2019-12-08

## 2019-12-05 RX ORDER — ALLOPURINOL 100 MG/1
100 TABLET ORAL DAILY
Status: DISCONTINUED | OUTPATIENT
Start: 2019-12-05 | End: 2019-12-05

## 2019-12-05 RX ORDER — GLIMEPIRIDE 2 MG/1
1 TABLET ORAL EVERY MORNING
Status: DISCONTINUED | OUTPATIENT
Start: 2019-12-05 | End: 2019-12-05

## 2019-12-05 RX ORDER — ACETAMINOPHEN 500 MG
1000 TABLET ORAL EVERY 6 HOURS
Qty: 30 TAB | Refills: 0 | Status: ON HOLD | OUTPATIENT
Start: 2019-12-05 | End: 2019-12-19

## 2019-12-05 RX ORDER — ATORVASTATIN CALCIUM 10 MG/1
20 TABLET, FILM COATED ORAL DAILY
Status: DISCONTINUED | OUTPATIENT
Start: 2019-12-06 | End: 2019-12-19 | Stop reason: HOSPADM

## 2019-12-05 RX ORDER — ATORVASTATIN CALCIUM 20 MG/1
20 TABLET, FILM COATED ORAL DAILY
Status: CANCELLED | OUTPATIENT
Start: 2019-12-06

## 2019-12-05 RX ORDER — DEXTROSE MONOHYDRATE 25 G/50ML
50 INJECTION, SOLUTION INTRAVENOUS
Status: DISCONTINUED | OUTPATIENT
Start: 2019-12-05 | End: 2019-12-10

## 2019-12-05 RX ORDER — HYDROCHLOROTHIAZIDE 25 MG/1
50 TABLET ORAL DAILY
Status: DISCONTINUED | OUTPATIENT
Start: 2019-12-05 | End: 2019-12-05

## 2019-12-05 RX ORDER — POLYVINYL ALCOHOL 14 MG/ML
1 SOLUTION/ DROPS OPHTHALMIC PRN
Status: DISCONTINUED | OUTPATIENT
Start: 2019-12-05 | End: 2019-12-19 | Stop reason: HOSPADM

## 2019-12-05 RX ORDER — OXYCODONE HYDROCHLORIDE 10 MG/1
10 TABLET ORAL
Status: DISCONTINUED | OUTPATIENT
Start: 2019-12-05 | End: 2019-12-19 | Stop reason: HOSPADM

## 2019-12-05 RX ORDER — OMEPRAZOLE 20 MG/1
20 CAPSULE, DELAYED RELEASE ORAL DAILY
Qty: 30 CAP | Status: ON HOLD
Start: 2019-12-06 | End: 2019-12-19

## 2019-12-05 RX ORDER — HYDRALAZINE HYDROCHLORIDE 25 MG/1
25 TABLET, FILM COATED ORAL EVERY 8 HOURS PRN
Status: DISCONTINUED | OUTPATIENT
Start: 2019-12-05 | End: 2019-12-19 | Stop reason: HOSPADM

## 2019-12-05 RX ORDER — TRAMADOL HYDROCHLORIDE 50 MG/1
50 TABLET ORAL EVERY 4 HOURS PRN
Status: DISCONTINUED | OUTPATIENT
Start: 2019-12-05 | End: 2019-12-19 | Stop reason: HOSPADM

## 2019-12-05 RX ORDER — CELECOXIB 100 MG/1
200 CAPSULE ORAL 2 TIMES DAILY
Status: DISCONTINUED | OUTPATIENT
Start: 2019-12-05 | End: 2019-12-06

## 2019-12-05 RX ORDER — CYCLOBENZAPRINE HCL 10 MG
TABLET ORAL
Status: COMPLETED
Start: 2019-12-05 | End: 2019-12-05

## 2019-12-05 RX ORDER — OXYCODONE HYDROCHLORIDE 5 MG/1
5 TABLET ORAL
Status: DISCONTINUED | OUTPATIENT
Start: 2019-12-05 | End: 2019-12-19 | Stop reason: HOSPADM

## 2019-12-05 RX ORDER — FLUOXETINE 10 MG/1
10 CAPSULE ORAL DAILY
Status: CANCELLED | OUTPATIENT
Start: 2019-12-06

## 2019-12-05 RX ORDER — LISINOPRIL 20 MG/1
20 TABLET ORAL DAILY
Qty: 30 TAB | Status: ON HOLD
Start: 2019-12-06 | End: 2019-12-19

## 2019-12-05 RX ORDER — OMEPRAZOLE 20 MG/1
20 CAPSULE, DELAYED RELEASE ORAL DAILY
Status: DISCONTINUED | OUTPATIENT
Start: 2019-12-05 | End: 2019-12-05 | Stop reason: HOSPADM

## 2019-12-05 RX ORDER — LISINOPRIL 20 MG/1
20 TABLET ORAL DAILY
Status: DISCONTINUED | OUTPATIENT
Start: 2019-12-05 | End: 2019-12-05 | Stop reason: HOSPADM

## 2019-12-05 RX ORDER — ONDANSETRON 4 MG/1
4 TABLET, ORALLY DISINTEGRATING ORAL 4 TIMES DAILY PRN
Status: DISCONTINUED | OUTPATIENT
Start: 2019-12-05 | End: 2019-12-19 | Stop reason: HOSPADM

## 2019-12-05 RX ORDER — LISINOPRIL 20 MG/1
20 TABLET ORAL DAILY
Status: DISCONTINUED | OUTPATIENT
Start: 2019-12-06 | End: 2019-12-06

## 2019-12-05 RX ADMIN — ATORVASTATIN CALCIUM 20 MG: 20 TABLET, FILM COATED ORAL at 12:06

## 2019-12-05 RX ADMIN — CELECOXIB 200 MG: 200 CAPSULE ORAL at 04:37

## 2019-12-05 RX ADMIN — OMEPRAZOLE 20 MG: 20 CAPSULE, DELAYED RELEASE ORAL at 12:06

## 2019-12-05 RX ADMIN — CYCLOBENZAPRINE 10 MG: 10 TABLET, FILM COATED ORAL at 21:29

## 2019-12-05 RX ADMIN — FLUOXETINE 10 MG: 10 CAPSULE ORAL at 12:06

## 2019-12-05 RX ADMIN — CYCLOBENZAPRINE 10 MG: 10 TABLET, FILM COATED ORAL at 17:28

## 2019-12-05 RX ADMIN — INSULIN HUMAN 1 UNITS: 100 INJECTION, SOLUTION PARENTERAL at 16:56

## 2019-12-05 RX ADMIN — OXYCODONE HYDROCHLORIDE 10 MG: 10 TABLET ORAL at 16:59

## 2019-12-05 RX ADMIN — ACETAMINOPHEN 1000 MG: 500 TABLET, FILM COATED ORAL at 17:27

## 2019-12-05 RX ADMIN — ACETAMINOPHEN 1000 MG: 500 TABLET ORAL at 12:06

## 2019-12-05 RX ADMIN — OXYCODONE HYDROCHLORIDE 10 MG: 10 TABLET ORAL at 21:36

## 2019-12-05 RX ADMIN — LISINOPRIL 20 MG: 20 TABLET ORAL at 12:06

## 2019-12-05 RX ADMIN — OXYCODONE HYDROCHLORIDE 10 MG: 10 TABLET ORAL at 12:09

## 2019-12-05 RX ADMIN — ACETAMINOPHEN 1000 MG: 500 TABLET ORAL at 04:37

## 2019-12-05 RX ADMIN — INSULIN HUMAN 1 UNITS: 100 INJECTION, SOLUTION PARENTERAL at 12:07

## 2019-12-05 RX ADMIN — DOCUSATE SODIUM 100 MG: 100 CAPSULE, LIQUID FILLED ORAL at 04:37

## 2019-12-05 RX ADMIN — SENNOSIDES AND DOCUSATE SODIUM 2 TABLET: 8.6; 5 TABLET ORAL at 21:29

## 2019-12-05 RX ADMIN — CELECOXIB 200 MG: 100 CAPSULE ORAL at 21:36

## 2019-12-05 RX ADMIN — INSULIN HUMAN 1 UNITS: 100 INJECTION, SOLUTION PARENTERAL at 08:23

## 2019-12-05 RX ADMIN — CYCLOBENZAPRINE 10 MG: 10 TABLET, FILM COATED ORAL at 16:55

## 2019-12-05 RX ADMIN — INFLUENZA A VIRUS A/MICHIGAN/45/2015 X-275 (H1N1) ANTIGEN (FORMALDEHYDE INACTIVATED), INFLUENZA A VIRUS A/SINGAPORE/INFIMH-16-0019/2016 IVR-186 (H3N2) ANTIGEN (FORMALDEHYDE INACTIVATED), AND INFLUENZA B VIRUS B/MARYLAND/15/2016 BX-69A (A B/COLORADO/6/2017-LIKE VIRUS) ANTIGEN (FORMALDEHYDE INACTIVATED) 0.5 ML: 60; 60; 60 INJECTION, SUSPENSION INTRAMUSCULAR at 14:07

## 2019-12-05 RX ADMIN — ENOXAPARIN SODIUM 30 MG: 100 INJECTION SUBCUTANEOUS at 04:37

## 2019-12-05 ASSESSMENT — GAIT ASSESSMENTS
DISTANCE (FEET): 100
GAIT LEVEL OF ASSIST: MINIMAL ASSIST
ASSISTIVE DEVICE: FRONT WHEEL WALKER
DEVIATION: BRADYKINETIC

## 2019-12-05 ASSESSMENT — LIFESTYLE VARIABLES
EVER FELT BAD OR GUILTY ABOUT YOUR DRINKING: NO
TOTAL SCORE: 0
HAVE YOU EVER FELT YOU SHOULD CUT DOWN ON YOUR DRINKING: NO
CONSUMPTION TOTAL: NEGATIVE
EVER_SMOKED: NEVER
ALCOHOL_USE: NO
EVER HAD A DRINK FIRST THING IN THE MORNING TO STEADY YOUR NERVES TO GET RID OF A HANGOVER: NO
HAVE PEOPLE ANNOYED YOU BY CRITICIZING YOUR DRINKING: NO
DOES PATIENT WANT TO STOP DRINKING: NO
HOW MANY TIMES IN THE PAST YEAR HAVE YOU HAD 5 OR MORE DRINKS IN A DAY: 0
TOTAL SCORE: 0
ON A TYPICAL DAY WHEN YOU DRINK ALCOHOL HOW MANY DRINKS DO YOU HAVE: 0
AVERAGE NUMBER OF DAYS PER WEEK YOU HAVE A DRINK CONTAINING ALCOHOL: 0
TOTAL SCORE: 0

## 2019-12-05 ASSESSMENT — PATIENT HEALTH QUESTIONNAIRE - PHQ9
2. FEELING DOWN, DEPRESSED, IRRITABLE, OR HOPELESS: NOT AT ALL
SUM OF ALL RESPONSES TO PHQ9 QUESTIONS 1 AND 2: 0
SUM OF ALL RESPONSES TO PHQ9 QUESTIONS 1 AND 2: 0
1. LITTLE INTEREST OR PLEASURE IN DOING THINGS: NOT AT ALL
1. LITTLE INTEREST OR PLEASURE IN DOING THINGS: NOT AT ALL
2. FEELING DOWN, DEPRESSED, IRRITABLE, OR HOPELESS: NOT AT ALL
SUM OF ALL RESPONSES TO PHQ9 QUESTIONS 1 AND 2: 0
1. LITTLE INTEREST OR PLEASURE IN DOING THINGS: NOT AT ALL
2. FEELING DOWN, DEPRESSED, IRRITABLE, OR HOPELESS: NOT AT ALL

## 2019-12-05 ASSESSMENT — COGNITIVE AND FUNCTIONAL STATUS - GENERAL
MOBILITY SCORE: 11
WALKING IN HOSPITAL ROOM: A LITTLE
DAILY ACTIVITIY SCORE: 15
SUGGESTED CMS G CODE MODIFIER DAILY ACTIVITY: CK
HELP NEEDED FOR BATHING: A LOT
SUGGESTED CMS G CODE MODIFIER MOBILITY: CL
MOVING FROM LYING ON BACK TO SITTING ON SIDE OF FLAT BED: UNABLE
TURNING FROM BACK TO SIDE WHILE IN FLAT BAD: UNABLE
PERSONAL GROOMING: A LITTLE
TOILETING: A LOT
DRESSING REGULAR UPPER BODY CLOTHING: A LITTLE
EATING MEALS: A LITTLE
DRESSING REGULAR LOWER BODY CLOTHING: A LOT
CLIMB 3 TO 5 STEPS WITH RAILING: A LOT
MOVING TO AND FROM BED TO CHAIR: UNABLE
STANDING UP FROM CHAIR USING ARMS: A LITTLE

## 2019-12-05 ASSESSMENT — ENCOUNTER SYMPTOMS
MYALGIAS: 1
DOUBLE VISION: 0
FEVER: 0
CHILLS: 0
NECK PAIN: 1
FOCAL WEAKNESS: 0
NAUSEA: 1
SENSORY CHANGE: 0
SHORTNESS OF BREATH: 0
VOMITING: 0
BACK PAIN: 0
ABDOMINAL PAIN: 0

## 2019-12-05 ASSESSMENT — COPD QUESTIONNAIRES
DURING THE PAST 4 WEEKS HOW MUCH DID YOU FEEL SHORT OF BREATH: NONE/LITTLE OF THE TIME
COPD SCREENING SCORE: 2
HAVE YOU SMOKED AT LEAST 100 CIGARETTES IN YOUR ENTIRE LIFE: NO/DON'T KNOW
DO YOU EVER COUGH UP ANY MUCUS OR PHLEGM?: NO/ONLY WITH OCCASIONAL COLDS OR INFECTIONS

## 2019-12-05 ASSESSMENT — ACTIVITIES OF DAILY LIVING (ADL): TOILETING: INDEPENDENT

## 2019-12-05 NOTE — DISCHARGE PLANNING
Anticipated Discharge Disposition: [facility]    Action: LSW informed by Chas with [facility] that pt accepted by [facility] and transport setup for 1515.   LSW met with pt at bedside to collect signature for COBRA. Pt provided signature. Pt stated that she had already reached out to family to inform about transport time.   SH[place] provided verbal via Tiger Text for COBRA.   LSW placed COBRA in pt's chart.    Barriers to Discharge: None    Plan: LSW to assist as needed.

## 2019-12-05 NOTE — DISCHARGE PLANNING
YENNY Hassan has medically cleared.  Dr. Lomax has accepted.  Spoke with Olga regarding [facility] and she is agreeable.  Transport has been arranged for 1515.  Senait Myles (CM) & Bren (BSN) are aware.

## 2019-12-05 NOTE — CARE PLAN
Problem: Safety  Goal: Will remain free from falls  Outcome: PROGRESSING AS EXPECTED  Note:   Pt. Uses call light appropriately and waits for assistance form staff before getting out of bed.      Problem: Skin Integrity  Goal: Risk for impaired skin integrity will decrease  Outcome: PROGRESSING AS EXPECTED  Note:   Pt. Able to turn and reposition self. Skin under C-Collar remains intact.
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
## 2019-12-05 NOTE — DISCHARGE INSTRUCTIONS
Discharge Instructions    Discharged to other by medical transportation with self. Discharged via wheelchair, hospital escort: Yes.  Special equipment needed: C-Collar    Be sure to schedule a follow-up appointment with your primary care doctor or any specialists as instructed.     Discharge Plan:   Diet Plan: Discussed  Activity Level: Discussed  Confirmed Follow up Appointment: Patient to Call and Schedule Appointment  Confirmed Symptoms Management: Discussed  Medication Reconciliation Updated: Yes  Influenza Vaccine Indication: Indicated: 65 years and older    I understand that a diet low in cholesterol, fat, and sodium is recommended for good health. Unless I have been given specific instructions below for another diet, I accept this instruction as my diet prescription.   Other diet: Diabetic     Special Instructions: None    · Is patient discharged on Warfarin / Coumadin?   No     Depression / Suicide Risk    As you are discharged from this [facility] facility, it is important to learn how to keep safe from harming yourself.    Recognize the warning signs:  · Abrupt changes in personality, positive or negative- including increase in energy   · Giving away possessions  · Change in eating patterns- significant weight changes-  positive or negative  · Change in sleeping patterns- unable to sleep or sleeping all the time   · Unwillingness or inability to communicate  · Depression  · Unusual sadness, discouragement and loneliness  · Talk of wanting to die  · Neglect of personal appearance   · Rebelliousness- reckless behavior  · Withdrawal from people/activities they love  · Confusion- inability to concentrate     If you or a loved one observes any of these behaviors or has concerns about self-harm, here's what you can do:  · Talk about it- your feelings and reasons for harming yourself  · Remove any means that you might use to hurt yourself (examples: pills, rope, extension cords, firearm)  · Get professional  help from the community (Mental Health, Substance Abuse, psychological counseling)  · Do not be alone:Call your Safe Contact- someone whom you trust who will be there for you.  · Call your local CRISIS HOTLINE 388-3254 or 270-567-0997  · Call your local Children's Mobile Crisis Response Team [place] (080) 673-4497 or www.[employer]  · Call the toll free National Suicide Prevention Hotlines   · National Suicide Prevention Lifeline 101-972-SWJL (8414)  · National Hope Line Network 800-SUICIDE (544-5141)

## 2019-12-05 NOTE — PROGRESS NOTES
Assumed care at 0700. Pt. Is A&OX 4. Cont of B&B. Needs X1 assist from staff. No c/o pain or discomfort. Diet Regular Diabetic and tolerating well. Hourly rounding in place. Bed alarm in use. Call light with in reach. Will continue to monitor.

## 2019-12-05 NOTE — ASSESSMENT & PLAN NOTE
Date of admission: 12/3/2019, admitted to cash  Date: 12/4/19 Rehab/SNF consult   Cleared for discharge: Yes - Date: 12/5/2019  Discharge delayed: No    Discharge date: TBD

## 2019-12-05 NOTE — THERAPY
"Occupational Therapy Evaluation completed.   Functional Status:  Pt is mod a supine to sit and sit to supine with HOB up and using rail, min a sit to stand, min a xfers, supervised functional mob with FWW and occasional verbal cues, supervised to don socks, supervised for brief standing grooming at sink. Pt limited by pain, dizziness, weakness, mild cognitive deficits.   Plan of Care: Will benefit from Occupational Therapy 3 times per week  Discharge Recommendations:  Equipment: Shower Chair. Post-acute therapy Discharge to a transitional care facility for continued skilled therapy services and/or Discharge to home with outpatient or home health for additional skilled therapy services.    Pt's d/c destination will depend on progress over next day or two and family support available at home. Pt may be able to stay with brother but that is uncertain. Pt does not have family support at home and closest family is 30 minutes away.     See \"Rehab Therapy-Acute\" Patient Summary Report for complete documentation.    "
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
## 2019-12-05 NOTE — CARE PLAN
Problem: Communication  Goal: The ability to communicate needs accurately and effectively will improve  Outcome: PROGRESSING AS EXPECTED     Problem: Safety  Goal: Will remain free from injury  Outcome: PROGRESSING AS EXPECTED  Note:   Pt ambulates with assistance and tolerates use of c-collar     Problem: Infection  Goal: Will remain free from infection  Outcome: PROGRESSING AS EXPECTED     Problem: Venous Thromboembolism (VTW)/Deep Vein Thrombosis (DVT) Prevention:  Goal: Patient will participate in Venous Thrombosis (VTE)/Deep Vein Thrombosis (DVT)Prevention Measures  Outcome: PROGRESSING AS EXPECTED     Problem: Bowel/Gastric:  Goal: Normal bowel function is maintained or improved  Outcome: PROGRESSING AS EXPECTED     Problem: Knowledge Deficit  Goal: Knowledge of disease process/condition, treatment plan, diagnostic tests, and medications will improve  Outcome: PROGRESSING AS EXPECTED  Note:   Pt educated on safety/self care

## 2019-12-05 NOTE — PROGRESS NOTES
Pt. Discharge to [facility] accompanied by [facility], all personal belongings accounted for and sent with patient. All discharge information given to patient and states an understanding. Escorted patient out of facility via wheelchair. VSS at time of discharge. Left facility in stable condition.

## 2019-12-05 NOTE — THERAPY
"Physical Therapy Treatment completed.   Bed Mobility:  Supine to Sit: Moderate Assist  Transfers: Sit to Stand: Minimal Assist  Gait: Level Of Assist: Minimal Assist with Front-Wheel Walker       Plan of Care: Will benefit from Physical Therapy 4 times per week  Discharge Recommendations: Equipment: Will Continue to Assess for Equipment Needs. Post-acute therapy Recommend post-acute placement for continued physical therapy services prior to discharge home. Patient can tolerate post-acute therapies at a 5x/week frequency.       See \"Rehab Therapy-Acute\" Patient Summary Report for complete documentation.     Pt was pleasant and agreeable to therapy session but continues to be limited by pain. Pt required vc for proper log rolling assist and modA. She completed transfers with Cricket for stability and use of fww. She was able to increase gait distance with hands on strictly for safety due to increased pain today. No overt LOB noted but did present with short shuffling steps and slow rica. Per eval continue to recommend post acute placement, will continue to follow while in house.   "

## 2019-12-05 NOTE — PROGRESS NOTES
Neurosurgery Progress Note    Subjective:  Moderate neck pain, headache- unchanged  Denies N/V, blurred vision, weakness, numbness/tingling    Exam:  A&O, laying in bed in c collar  Facial/scalp abrasions  FS x4, sensation grossly intact  PERRL, EOM intact, facial movements symmetric    BP  Min: 99/51  Max: 112/56  Pulse  Av.3  Min: 60  Max: 86  Resp  Av.6  Min: 15  Max: 20  Temp  Av.6 °C (97.9 °F)  Min: 36.2 °C (97.1 °F)  Max: 36.9 °C (98.5 °F)  SpO2  Av.8 %  Min: 90 %  Max: 95 %    No data recorded    Recent Labs     19  2019 19  0452   WBC 8.1 3.0*   RBC 3.92* 3.18*   HEMOGLOBIN 11.8* 9.5*   HEMATOCRIT 36.3* 29.8*   MCV 92.6 92.8   MCH 30.1 29.6   MCHC 32.5* 31.9*   RDW 51.2* 52.5*   PLATELETCT 82* 68*   MPV 10.4 10.4     Recent Labs     19  0452   SODIUM 137 136   POTASSIUM 4.0 4.3   CHLORIDE 105 105   CO2 25 27   GLUCOSE 205* 175*   BUN 24* 27*   CREATININE 0.99 0.97   CALCIUM 9.8 9.4     Recent Labs     19  2045   APTT 26.8   INR 1.13           Intake/Output       19 - 19 07 - 19 0659      1900-0659 Total [id number] Total       Intake    P.O.  665  -- 665  --  -- --    P.O. 665 -- 665 -- -- --    Total Intake 665 -- 665 -- -- --       Output    Urine  --  -- --  --  -- --    Number of Times Voided 1 x 1 x 2 x -- -- --    Total Output -- -- -- -- -- --       Net I/O     665 -- 665 -- -- --            Intake/Output Summary (Last 24 hours) at 2019 1003  Last data filed at 2019 1800  Gross per 24 hour   Intake 550 ml   Output --   Net 550 ml            • Respiratory Care per Protocol   Continuous RT   • Pharmacy Consult Request  1 Each PHARMACY TO DOSE   • docusate sodium  100 mg BID   • senna-docusate  1 Tab Nightly   • senna-docusate  1 Tab Q24HRS PRN   • polyethylene glycol/lytes  1 Packet BID   • magnesium hydroxide  30 mL DAILY   • bisacodyl  10 mg Q24HRS PRN   • fleet  1 Each Once  PRN   • LR   Continuous   • enoxaparin (LOVENOX) injection  30 mg Q12HRS   • acetaminophen  1,000 mg Q6HRS   • celecoxib  200 mg BID   • oxyCODONE immediate-release  5 mg Q3HRS PRN   • oxyCODONE immediate release  10 mg Q3HRS PRN   • morphine injection  4 mg Q3HRS PRN   • ondansetron  4 mg Q4HRS PRN   • insulin regular  1-6 Units 4X/DAY ACHS    And   • glucose  16 g Q15 MIN PRN    And   • dextrose 10% bolus  250 mL Q15 MIN PRN   • Influenza Vaccine High-Dose pf  0.5 mL Once PRN       Assessment and Plan:  Hospital day #3  POD #NA  Prophylactic anticoagulation: no         Start date/time: tbd, pt is ambulatory  Neuro stable  Continue pain control, c collar  Recommend repeat standing CSP XR prior to discharge (order placed)  DC per trauma team  Follow up at [facility]

## 2019-12-05 NOTE — PROGRESS NOTES
Pt care assumed approximately 1900  Bedside report from day shift RN  Pt in bed A&Ox4  fall precautions in place  Call light and belongings with in reach  Bed locked and in lowest position  Pt expresses no needs/concerns at this time  Hourly rounding provided

## 2019-12-06 ENCOUNTER — APPOINTMENT (OUTPATIENT)
Dept: RADIOLOGY | Facility: REHABILITATION | Age: 69
DRG: 949 | End: 2019-12-06
Attending: NURSE PRACTITIONER
Payer: COMMERCIAL

## 2019-12-06 PROBLEM — D61.818 PANCYTOPENIA (HCC): Status: ACTIVE | Noted: 2019-12-06

## 2019-12-06 PROBLEM — D64.9 ANEMIA: Status: ACTIVE | Noted: 2019-12-06

## 2019-12-06 PROBLEM — K59.00 CONSTIPATION: Status: ACTIVE | Noted: 2019-12-06

## 2019-12-06 LAB
25(OH)D3 SERPL-MCNC: 33 NG/ML (ref 30–100)
ALBUMIN SERPL BCP-MCNC: 3 G/DL (ref 3.2–4.9)
ALBUMIN/GLOB SERPL: 1.5 G/DL
ALP SERPL-CCNC: 55 U/L (ref 30–99)
ALT SERPL-CCNC: 19 U/L (ref 2–50)
ANION GAP SERPL CALC-SCNC: 6 MMOL/L (ref 0–11.9)
AST SERPL-CCNC: 17 U/L (ref 12–45)
BASOPHILS # BLD AUTO: 0.5 % (ref 0–1.8)
BASOPHILS # BLD: 0.01 K/UL (ref 0–0.12)
BILIRUB SERPL-MCNC: 1.1 MG/DL (ref 0.1–1.5)
BUN SERPL-MCNC: 24 MG/DL (ref 8–22)
CALCIUM SERPL-MCNC: 9.5 MG/DL (ref 8.5–10.5)
CHLORIDE SERPL-SCNC: 107 MMOL/L (ref 96–112)
CO2 SERPL-SCNC: 26 MMOL/L (ref 20–33)
CREAT SERPL-MCNC: 0.91 MG/DL (ref 0.5–1.4)
EOSINOPHIL # BLD AUTO: 0.06 K/UL (ref 0–0.51)
EOSINOPHIL NFR BLD: 3 % (ref 0–6.9)
ERYTHROCYTE [DISTWIDTH] IN BLOOD BY AUTOMATED COUNT: 52.6 FL (ref 35.9–50)
EST. AVERAGE GLUCOSE BLD GHB EST-MCNC: 148 MG/DL
GLOBULIN SER CALC-MCNC: 2 G/DL (ref 1.9–3.5)
GLUCOSE BLD-MCNC: 143 MG/DL (ref 65–99)
GLUCOSE BLD-MCNC: 148 MG/DL (ref 65–99)
GLUCOSE BLD-MCNC: 156 MG/DL (ref 65–99)
GLUCOSE BLD-MCNC: 189 MG/DL (ref 65–99)
GLUCOSE BLD-MCNC: 197 MG/DL (ref 65–99)
GLUCOSE SERPL-MCNC: 151 MG/DL (ref 65–99)
HBA1C MFR BLD: 6.8 % (ref 0–5.6)
HCT VFR BLD AUTO: 27.9 % (ref 37–47)
HGB BLD-MCNC: 8.9 G/DL (ref 12–16)
IMM GRANULOCYTES # BLD AUTO: 0.01 K/UL (ref 0–0.11)
IMM GRANULOCYTES NFR BLD AUTO: 0.5 % (ref 0–0.9)
LYMPHOCYTES # BLD AUTO: 0.9 K/UL (ref 1–4.8)
LYMPHOCYTES NFR BLD: 45 % (ref 22–41)
MCH RBC QN AUTO: 29.9 PG (ref 27–33)
MCHC RBC AUTO-ENTMCNC: 31.9 G/DL (ref 33.6–35)
MCV RBC AUTO: 93.6 FL (ref 81.4–97.8)
MONOCYTES # BLD AUTO: 0.16 K/UL (ref 0–0.85)
MONOCYTES NFR BLD AUTO: 8 % (ref 0–13.4)
NEUTROPHILS # BLD AUTO: 0.86 K/UL (ref 2–7.15)
NEUTROPHILS NFR BLD: 43 % (ref 44–72)
NRBC # BLD AUTO: 0 K/UL
NRBC BLD-RTO: 0 /100 WBC
PLATELET # BLD AUTO: 61 K/UL (ref 164–446)
PMV BLD AUTO: 10.6 FL (ref 9–12.9)
POTASSIUM SERPL-SCNC: 4.4 MMOL/L (ref 3.6–5.5)
PROT SERPL-MCNC: 5 G/DL (ref 6–8.2)
RBC # BLD AUTO: 2.98 M/UL (ref 4.2–5.4)
SODIUM SERPL-SCNC: 139 MMOL/L (ref 135–145)
TSH SERPL DL<=0.005 MIU/L-ACNC: 2.19 UIU/ML (ref 0.38–5.33)
WBC # BLD AUTO: 2 K/UL (ref 4.8–10.8)

## 2019-12-06 PROCEDURE — 97166 OT EVAL MOD COMPLEX 45 MIN: CPT

## 2019-12-06 PROCEDURE — 99223 1ST HOSP IP/OBS HIGH 75: CPT | Mod: AI | Performed by: HOSPITALIST

## 2019-12-06 PROCEDURE — 36415 COLL VENOUS BLD VENIPUNCTURE: CPT

## 2019-12-06 PROCEDURE — 83036 HEMOGLOBIN GLYCOSYLATED A1C: CPT

## 2019-12-06 PROCEDURE — 700111 HCHG RX REV CODE 636 W/ 250 OVERRIDE (IP): Performed by: PHYSICAL MEDICINE & REHABILITATION

## 2019-12-06 PROCEDURE — 700101 HCHG RX REV CODE 250: Performed by: PHYSICAL MEDICINE & REHABILITATION

## 2019-12-06 PROCEDURE — A9270 NON-COVERED ITEM OR SERVICE: HCPCS | Performed by: HOSPITALIST

## 2019-12-06 PROCEDURE — 72040 X-RAY EXAM NECK SPINE 2-3 VW: CPT

## 2019-12-06 PROCEDURE — A9270 NON-COVERED ITEM OR SERVICE: HCPCS | Performed by: PHYSICAL MEDICINE & REHABILITATION

## 2019-12-06 PROCEDURE — 82962 GLUCOSE BLOOD TEST: CPT | Mod: 91

## 2019-12-06 PROCEDURE — 770010 HCHG ROOM/CARE - REHAB SEMI PRIVAT*

## 2019-12-06 PROCEDURE — 97162 PT EVAL MOD COMPLEX 30 MIN: CPT

## 2019-12-06 PROCEDURE — 700102 HCHG RX REV CODE 250 W/ 637 OVERRIDE(OP): Performed by: HOSPITALIST

## 2019-12-06 PROCEDURE — 82270 OCCULT BLOOD FECES: CPT

## 2019-12-06 PROCEDURE — 700111 HCHG RX REV CODE 636 W/ 250 OVERRIDE (IP): Performed by: HOSPITALIST

## 2019-12-06 PROCEDURE — 84443 ASSAY THYROID STIM HORMONE: CPT

## 2019-12-06 PROCEDURE — 99233 SBSQ HOSP IP/OBS HIGH 50: CPT | Performed by: PHYSICAL MEDICINE & REHABILITATION

## 2019-12-06 PROCEDURE — 97535 SELF CARE MNGMENT TRAINING: CPT

## 2019-12-06 PROCEDURE — 82306 VITAMIN D 25 HYDROXY: CPT

## 2019-12-06 PROCEDURE — 97530 THERAPEUTIC ACTIVITIES: CPT

## 2019-12-06 PROCEDURE — 80053 COMPREHEN METABOLIC PANEL: CPT

## 2019-12-06 PROCEDURE — 85025 COMPLETE CBC W/AUTO DIFF WBC: CPT

## 2019-12-06 PROCEDURE — 92523 SPEECH SOUND LANG COMPREHEN: CPT

## 2019-12-06 PROCEDURE — 700102 HCHG RX REV CODE 250 W/ 637 OVERRIDE(OP): Performed by: PHYSICAL MEDICINE & REHABILITATION

## 2019-12-06 RX ORDER — LISINOPRIL 5 MG/1
10 TABLET ORAL DAILY
Status: DISCONTINUED | OUTPATIENT
Start: 2019-12-07 | End: 2019-12-19 | Stop reason: HOSPADM

## 2019-12-06 RX ORDER — LACTULOSE 20 G/30ML
30 SOLUTION ORAL 2 TIMES DAILY
Status: DISCONTINUED | OUTPATIENT
Start: 2019-12-06 | End: 2019-12-08

## 2019-12-06 RX ADMIN — LACTULOSE 30 ML: 20 SOLUTION ORAL at 20:04

## 2019-12-06 RX ADMIN — INSULIN HUMAN 1 UNITS: 100 INJECTION, SOLUTION PARENTERAL at 17:14

## 2019-12-06 RX ADMIN — ACETAMINOPHEN 1000 MG: 500 TABLET, FILM COATED ORAL at 17:24

## 2019-12-06 RX ADMIN — CYCLOBENZAPRINE 10 MG: 10 TABLET, FILM COATED ORAL at 08:21

## 2019-12-06 RX ADMIN — CELECOXIB 200 MG: 100 CAPSULE ORAL at 08:21

## 2019-12-06 RX ADMIN — ACETAMINOPHEN 1000 MG: 500 TABLET, FILM COATED ORAL at 05:55

## 2019-12-06 RX ADMIN — OXYCODONE HYDROCHLORIDE 10 MG: 10 TABLET ORAL at 22:55

## 2019-12-06 RX ADMIN — FLUOXETINE HYDROCHLORIDE 10 MG: 20 TABLET, FILM COATED ORAL at 08:22

## 2019-12-06 RX ADMIN — INSULIN HUMAN 1 UNITS: 100 INJECTION, SOLUTION PARENTERAL at 11:11

## 2019-12-06 RX ADMIN — ATORVASTATIN CALCIUM 20 MG: 10 TABLET, FILM COATED ORAL at 08:21

## 2019-12-06 RX ADMIN — OXYCODONE HYDROCHLORIDE 10 MG: 10 TABLET ORAL at 05:57

## 2019-12-06 RX ADMIN — TBO-FILGRASTIM 480 MCG: 480 INJECTION, SOLUTION SUBCUTANEOUS at 17:25

## 2019-12-06 RX ADMIN — MAGNESIUM HYDROXIDE 30 ML: 400 SUSPENSION ORAL at 14:44

## 2019-12-06 RX ADMIN — OMEPRAZOLE 20 MG: 20 CAPSULE, DELAYED RELEASE ORAL at 08:22

## 2019-12-06 RX ADMIN — INSULIN HUMAN 1 UNITS: 100 INJECTION, SOLUTION PARENTERAL at 20:08

## 2019-12-06 RX ADMIN — SENNOSIDES AND DOCUSATE SODIUM 2 TABLET: 8.6; 5 TABLET ORAL at 20:04

## 2019-12-06 RX ADMIN — CYCLOBENZAPRINE 10 MG: 10 TABLET, FILM COATED ORAL at 14:44

## 2019-12-06 RX ADMIN — CYCLOBENZAPRINE 10 MG: 10 TABLET, FILM COATED ORAL at 20:04

## 2019-12-06 RX ADMIN — SENNOSIDES AND DOCUSATE SODIUM 2 TABLET: 8.6; 5 TABLET ORAL at 08:21

## 2019-12-06 RX ADMIN — ENOXAPARIN SODIUM 40 MG: 40 INJECTION, SOLUTION INTRAVENOUS; SUBCUTANEOUS at 08:23

## 2019-12-06 RX ADMIN — LIDOCAINE 2 PATCH: 50 PATCH TOPICAL at 08:23

## 2019-12-06 RX ADMIN — METFORMIN HYDROCHLORIDE 250 MG: 500 TABLET ORAL at 17:24

## 2019-12-06 RX ADMIN — ACETAMINOPHEN 1000 MG: 500 TABLET, FILM COATED ORAL at 11:17

## 2019-12-06 ASSESSMENT — BRIEF INTERVIEW FOR MENTAL STATUS (BIMS)
INITIAL REPETITION OF BED BLUE SOCK - FIRST ATTEMPT: 3
WHAT DAY OF THE WEEK IS IT: CORRECT
WHAT DAY OF THE WEEK IS IT: CORRECT
ASKED TO RECALL SOCK: NO, COULD NOT RECALL
BIMS SUMMARY SCORE: 13
WHAT MONTH IS IT: ACCURATE WITHIN 5 DAYS
ASKED TO RECALL BLUE: YES, NO CUE REQUIRED
WHAT YEAR IS IT: CORRECT
BIMS SUMMARY SCORE: 15
INITIAL REPETITION OF BED BLUE SOCK - FIRST ATTEMPT: 3
ASKED TO RECALL SOCK: YES, NO CUE REQUIRED
ASKED TO RECALL BLUE: YES, NO CUE REQUIRED
WHAT MONTH IS IT: ACCURATE WITHIN 5 DAYS
ASKED TO RECALL BED: YES, NO CUE REQUIRED
ASKED TO RECALL BED: YES, NO CUE REQUIRED
WHAT YEAR IS IT: CORRECT

## 2019-12-06 ASSESSMENT — PATIENT HEALTH QUESTIONNAIRE - PHQ9
SUM OF ALL RESPONSES TO PHQ9 QUESTIONS 1 AND 2: 0
2. FEELING DOWN, DEPRESSED, IRRITABLE, OR HOPELESS: NOT AT ALL
2. FEELING DOWN, DEPRESSED, IRRITABLE, OR HOPELESS: NOT AT ALL
1. LITTLE INTEREST OR PLEASURE IN DOING THINGS: NOT AT ALL
SUM OF ALL RESPONSES TO PHQ9 QUESTIONS 1 AND 2: 0
1. LITTLE INTEREST OR PLEASURE IN DOING THINGS: NOT AT ALL

## 2019-12-06 ASSESSMENT — ACTIVITIES OF DAILY LIVING (ADL): TOILETING: INDEPENDENT

## 2019-12-06 NOTE — FLOWSHEET NOTE
12/05/19 1708   Type of Assessment   Assessment Yes   Patient History   Pulmonary Diagnosis None   Surgical Procedures None   Home O2 No   Home Treatments/Frequency No   COPD Risk Screening   Do you have a history of COPD? No   COPD Population Screener   During the past 4 weeks, how much did you feel short of breath? 0   Do you ever cough up any mucus or phlegm? 0   In the past 12 months, you do less than you used to because of your breathing problems 0   Have you smoked at least 100 cigarettes in your entire life? 0   How old are you? 1   COPD Screening Score 2   Smoking History   Have you ever smoked Never   Level Of Consciousness   Level of Consciousness Alert   Respiratory WDL   Respiratory (WDL) X   Chest Exam   Respiration 16   Pulse 85   Oximetry   #Pulse Oximetry (Single Determination) Yes   Oxygen   Home O2 Use Prior To Admission? No   Pulse Oximetry 98 %   O2 Daily Delivery Respiratory  Room Air with O2 Available

## 2019-12-06 NOTE — CARE PLAN
Problem: Communication  Goal: The ability to communicate needs accurately and effectively will improve  Outcome: PROGRESSING AS EXPECTED  Note:   Patient able to verbalize needs.  Will continue to monitor.      Problem: Safety  Goal: Will remain free from injury  Outcome: PROGRESSING AS EXPECTED  Note:   Pt uses call light consistently and appropriately. Waits for assistance does not attempt self transfer this shift. Able to verbalize needs.      Problem: Pain Management  Goal: Pain level will decrease to patient's comfort goal  Outcome: PROGRESSING AS EXPECTED  Note:   Patient able to verbalize pain level and verbalize an acceptable level of pain.
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
## 2019-12-06 NOTE — CARE PLAN
Problem: Safety  Goal: Will remain free from injury  Outcome: PROGRESSING AS EXPECTED   Pt uses call light consistently and appropriately. Waits for assistance does not attempt self transfer this shift. Able to verbalize needs.   Problem: Pain Management  Goal: Pain level will decrease to patient's comfort goal  Outcome: PROGRESSING AS EXPECTED   Patient reports satisfactory pain control and decrease intensity after pharmacological pain management.

## 2019-12-06 NOTE — CARE PLAN
Problem: Safety  Goal: Will remain free from injury  Outcome: PROGRESSING AS EXPECTED  Note:   Reinforced safety precautions to pt. Encouraged pt to use call light when assistance is needed, call light is within easy reach, bed is locked and at lowest position.       Problem: Bowel/Gastric:  Goal: Normal bowel function is maintained or improved  Outcome: PROGRESSING SLOWER THAN EXPECTED  Note:   Patient's last BM was on 12/3. Medicated her with MOM for constipation. Will continue to monitor.
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
## 2019-12-06 NOTE — PROGRESS NOTES
Patient admitted to facility at 15:30 via wheel chair; accompanied by hospital transport.  Patient assisted to room and positioned in bed for comfort and safety; call light within reach.  Patient assisted with stowing belongings and oriented to room and facility.  Admission assessment performed, skin checked by 2 RN (Manuela/Becca) and documented in computer.  Admission paperwork completed; signed copies placed in chart.  Will continue to monitor.

## 2019-12-07 LAB
BASOPHILS # BLD AUTO: 0.6 % (ref 0–1.8)
BASOPHILS # BLD: 0.03 K/UL (ref 0–0.12)
EOSINOPHIL # BLD AUTO: 0.06 K/UL (ref 0–0.51)
EOSINOPHIL NFR BLD: 1.2 % (ref 0–6.9)
ERYTHROCYTE [DISTWIDTH] IN BLOOD BY AUTOMATED COUNT: 53.9 FL (ref 35.9–50)
FERRITIN SERPL-MCNC: 47.7 NG/ML (ref 10–291)
FOLATE SERPL-MCNC: 11.4 NG/ML
GLUCOSE BLD-MCNC: 143 MG/DL (ref 65–99)
GLUCOSE BLD-MCNC: 164 MG/DL (ref 65–99)
GLUCOSE BLD-MCNC: 202 MG/DL (ref 65–99)
HCT VFR BLD AUTO: 28.5 % (ref 37–47)
HGB BLD-MCNC: 9.2 G/DL (ref 12–16)
IMM GRANULOCYTES # BLD AUTO: 0.02 K/UL (ref 0–0.11)
IMM GRANULOCYTES NFR BLD AUTO: 0.4 % (ref 0–0.9)
IRON SATN MFR SERPL: 15 % (ref 15–55)
IRON SERPL-MCNC: 50 UG/DL (ref 40–170)
LYMPHOCYTES # BLD AUTO: 1.08 K/UL (ref 1–4.8)
LYMPHOCYTES NFR BLD: 22.4 % (ref 22–41)
MCH RBC QN AUTO: 30.4 PG (ref 27–33)
MCHC RBC AUTO-ENTMCNC: 32.3 G/DL (ref 33.6–35)
MCV RBC AUTO: 94.1 FL (ref 81.4–97.8)
MONOCYTES # BLD AUTO: 0.33 K/UL (ref 0–0.85)
MONOCYTES NFR BLD AUTO: 6.8 % (ref 0–13.4)
NEUTROPHILS # BLD AUTO: 3.3 K/UL (ref 2–7.15)
NEUTROPHILS NFR BLD: 68.6 % (ref 44–72)
NRBC # BLD AUTO: 0 K/UL
NRBC BLD-RTO: 0 /100 WBC
PLATELET # BLD AUTO: 61 K/UL (ref 164–446)
PMV BLD AUTO: 10.9 FL (ref 9–12.9)
RBC # BLD AUTO: 3.03 M/UL (ref 4.2–5.4)
TIBC SERPL-MCNC: 339 UG/DL (ref 250–450)
VIT B12 SERPL-MCNC: 495 PG/ML (ref 211–911)
WBC # BLD AUTO: 4.8 K/UL (ref 4.8–10.8)

## 2019-12-07 PROCEDURE — 82746 ASSAY OF FOLIC ACID SERUM: CPT

## 2019-12-07 PROCEDURE — 700111 HCHG RX REV CODE 636 W/ 250 OVERRIDE (IP): Performed by: PHYSICAL MEDICINE & REHABILITATION

## 2019-12-07 PROCEDURE — 700111 HCHG RX REV CODE 636 W/ 250 OVERRIDE (IP): Performed by: HOSPITALIST

## 2019-12-07 PROCEDURE — 700102 HCHG RX REV CODE 250 W/ 637 OVERRIDE(OP): Performed by: HOSPITALIST

## 2019-12-07 PROCEDURE — 770010 HCHG ROOM/CARE - REHAB SEMI PRIVAT*

## 2019-12-07 PROCEDURE — 700101 HCHG RX REV CODE 250: Performed by: PHYSICAL MEDICINE & REHABILITATION

## 2019-12-07 PROCEDURE — 700102 HCHG RX REV CODE 250 W/ 637 OVERRIDE(OP): Performed by: PHYSICAL MEDICINE & REHABILITATION

## 2019-12-07 PROCEDURE — 82728 ASSAY OF FERRITIN: CPT

## 2019-12-07 PROCEDURE — 99232 SBSQ HOSP IP/OBS MODERATE 35: CPT | Performed by: HOSPITALIST

## 2019-12-07 PROCEDURE — 82607 VITAMIN B-12: CPT

## 2019-12-07 PROCEDURE — 85025 COMPLETE CBC W/AUTO DIFF WBC: CPT

## 2019-12-07 PROCEDURE — 83550 IRON BINDING TEST: CPT

## 2019-12-07 PROCEDURE — A9270 NON-COVERED ITEM OR SERVICE: HCPCS | Performed by: PHYSICAL MEDICINE & REHABILITATION

## 2019-12-07 PROCEDURE — A9270 NON-COVERED ITEM OR SERVICE: HCPCS | Performed by: HOSPITALIST

## 2019-12-07 PROCEDURE — 83540 ASSAY OF IRON: CPT

## 2019-12-07 PROCEDURE — 82962 GLUCOSE BLOOD TEST: CPT | Mod: 91

## 2019-12-07 PROCEDURE — 36415 COLL VENOUS BLD VENIPUNCTURE: CPT

## 2019-12-07 RX ADMIN — FLUOXETINE HYDROCHLORIDE 10 MG: 20 TABLET, FILM COATED ORAL at 08:41

## 2019-12-07 RX ADMIN — CYCLOBENZAPRINE 10 MG: 10 TABLET, FILM COATED ORAL at 20:53

## 2019-12-07 RX ADMIN — TBO-FILGRASTIM 480 MCG: 480 INJECTION, SOLUTION SUBCUTANEOUS at 14:27

## 2019-12-07 RX ADMIN — TRAMADOL HYDROCHLORIDE 50 MG: 50 TABLET, COATED ORAL at 21:01

## 2019-12-07 RX ADMIN — LACTULOSE 30 ML: 20 SOLUTION ORAL at 08:44

## 2019-12-07 RX ADMIN — ACETAMINOPHEN 1000 MG: 500 TABLET, FILM COATED ORAL at 11:20

## 2019-12-07 RX ADMIN — INSULIN HUMAN 2 UNITS: 100 INJECTION, SOLUTION PARENTERAL at 11:18

## 2019-12-07 RX ADMIN — METFORMIN HYDROCHLORIDE 250 MG: 500 TABLET ORAL at 12:24

## 2019-12-07 RX ADMIN — METFORMIN HYDROCHLORIDE 250 MG: 500 TABLET ORAL at 08:41

## 2019-12-07 RX ADMIN — INSULIN HUMAN 1 UNITS: 100 INJECTION, SOLUTION PARENTERAL at 07:29

## 2019-12-07 RX ADMIN — ATORVASTATIN CALCIUM 20 MG: 10 TABLET, FILM COATED ORAL at 08:41

## 2019-12-07 RX ADMIN — TRAZODONE HYDROCHLORIDE 50 MG: 50 TABLET ORAL at 23:47

## 2019-12-07 RX ADMIN — ACETAMINOPHEN 1000 MG: 500 TABLET, FILM COATED ORAL at 17:20

## 2019-12-07 RX ADMIN — METFORMIN HYDROCHLORIDE 500 MG: 500 TABLET ORAL at 17:20

## 2019-12-07 RX ADMIN — OMEPRAZOLE 20 MG: 20 CAPSULE, DELAYED RELEASE ORAL at 08:41

## 2019-12-07 RX ADMIN — OXYCODONE HYDROCHLORIDE 10 MG: 10 TABLET ORAL at 06:10

## 2019-12-07 RX ADMIN — CYCLOBENZAPRINE 10 MG: 10 TABLET, FILM COATED ORAL at 14:28

## 2019-12-07 RX ADMIN — SENNOSIDES AND DOCUSATE SODIUM 2 TABLET: 8.6; 5 TABLET ORAL at 08:41

## 2019-12-07 RX ADMIN — OXYCODONE HYDROCHLORIDE 10 MG: 10 TABLET ORAL at 23:49

## 2019-12-07 RX ADMIN — ENOXAPARIN SODIUM 40 MG: 40 INJECTION, SOLUTION INTRAVENOUS; SUBCUTANEOUS at 08:43

## 2019-12-07 RX ADMIN — ACETAMINOPHEN 1000 MG: 500 TABLET, FILM COATED ORAL at 06:09

## 2019-12-07 RX ADMIN — CYCLOBENZAPRINE 10 MG: 10 TABLET, FILM COATED ORAL at 08:41

## 2019-12-07 ASSESSMENT — ENCOUNTER SYMPTOMS
HALLUCINATIONS: 0
VOMITING: 0
DIZZINESS: 0
BLURRED VISION: 0
HEADACHES: 0
FEVER: 0
SHORTNESS OF BREATH: 0
NAUSEA: 0
PALPITATIONS: 0

## 2019-12-07 ASSESSMENT — PATIENT HEALTH QUESTIONNAIRE - PHQ9
1. LITTLE INTEREST OR PLEASURE IN DOING THINGS: NOT AT ALL
SUM OF ALL RESPONSES TO PHQ9 QUESTIONS 1 AND 2: 0
2. FEELING DOWN, DEPRESSED, IRRITABLE, OR HOPELESS: NOT AT ALL

## 2019-12-07 NOTE — ASSESSMENT & PLAN NOTE
HbA1c 6.8  On Metformin: 500 mg bid  Off accuchecks  Not: home meds include Metformin 1000 mg bid and Amaryl 1 mg qam
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
## 2019-12-07 NOTE — CARE PLAN
Problem: Communication  Goal: The ability to communicate needs accurately and effectively will improve  Outcome: PROGRESSING AS EXPECTED  Note:   Patient able to verbalize needs.  Will continue to monitor.      Problem: Safety  Goal: Will remain free from injury  Outcome: PROGRESSING AS EXPECTED  Note:   Pt uses call light consistently and appropriately. Waits for assistance does not attempt self transfer this shift. Able to verbalize needs.      Problem: Bowel/Gastric:  Goal: Normal bowel function is maintained or improved  Outcome: PROGRESSING SLOWER THAN EXPECTED  Note:   Patient having regular bowel movements; last BM 12/3.  Denies s/s constipation; bowel meds available if needed.  Will continue to monitor.      Problem: Pain Management  Goal: Pain level will decrease to patient's comfort goal  Outcome: PROGRESSING AS EXPECTED  Note:   Patient able to verbalize pain level and verbalize an acceptable level of pain.

## 2019-12-07 NOTE — ASSESSMENT & PLAN NOTE
Hb: 9.5 (12/17) --> 9.7 (12/18)  WBC: 2.0 (12/6) --> Granix --> 4.8 (12/7) --> 2.8 (12/11) --> Granix --> 7.0 (12/13) --> 2.8 (12/17) --> Granix --> 11.6 (12/18)  Platelets: 79 (12/17) --> 83 (12/18)  Fe: 50, sats 15%  B12: 495  Folate: 11.4  FOB (-) x 1  S/P Granix x 1 dose (12/6)  S/P Granix x 1 dose (12/11)  S/P Granix x 1 dose (12/18)  Will check am labs before going home on 12/19  Off Celebrex -- has a low SE of pancytopenia (12/6)  ? 2nd to Prozac -- but was on at home for a while  Needs f/u with Hematology -- d/w CM -- will be scheduled  Monitor

## 2019-12-07 NOTE — ASSESSMENT & PLAN NOTE
Resolving  Had several BM's recently  On scheduled Senokot  On Miralax daily prn  On scheduled Lactulose bid (12/6) --> will change to prn (12/8)  Cont to monitor
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
## 2019-12-08 LAB
GLUCOSE BLD-MCNC: 104 MG/DL (ref 65–99)
GLUCOSE BLD-MCNC: 129 MG/DL (ref 65–99)
GLUCOSE BLD-MCNC: 134 MG/DL (ref 65–99)
GLUCOSE BLD-MCNC: 181 MG/DL (ref 65–99)
HEMOCCULT SP1 STL QL: NEGATIVE
HEMOCCULT SP2 STL QL: NEGATIVE

## 2019-12-08 PROCEDURE — 99232 SBSQ HOSP IP/OBS MODERATE 35: CPT | Performed by: HOSPITALIST

## 2019-12-08 PROCEDURE — 97530 THERAPEUTIC ACTIVITIES: CPT

## 2019-12-08 PROCEDURE — 97535 SELF CARE MNGMENT TRAINING: CPT

## 2019-12-08 PROCEDURE — 97116 GAIT TRAINING THERAPY: CPT

## 2019-12-08 PROCEDURE — 700102 HCHG RX REV CODE 250 W/ 637 OVERRIDE(OP): Performed by: PHYSICAL MEDICINE & REHABILITATION

## 2019-12-08 PROCEDURE — A9270 NON-COVERED ITEM OR SERVICE: HCPCS | Performed by: PHYSICAL MEDICINE & REHABILITATION

## 2019-12-08 PROCEDURE — G0515 COGNITIVE SKILLS DEVELOPMENT: HCPCS

## 2019-12-08 PROCEDURE — A9270 NON-COVERED ITEM OR SERVICE: HCPCS | Performed by: HOSPITALIST

## 2019-12-08 PROCEDURE — 97110 THERAPEUTIC EXERCISES: CPT

## 2019-12-08 PROCEDURE — 770010 HCHG ROOM/CARE - REHAB SEMI PRIVAT*

## 2019-12-08 PROCEDURE — 700102 HCHG RX REV CODE 250 W/ 637 OVERRIDE(OP): Performed by: HOSPITALIST

## 2019-12-08 PROCEDURE — 82962 GLUCOSE BLOOD TEST: CPT

## 2019-12-08 PROCEDURE — 700111 HCHG RX REV CODE 636 W/ 250 OVERRIDE (IP): Performed by: PHYSICAL MEDICINE & REHABILITATION

## 2019-12-08 RX ORDER — LACTULOSE 20 G/30ML
30 SOLUTION ORAL 2 TIMES DAILY PRN
Status: DISCONTINUED | OUTPATIENT
Start: 2019-12-08 | End: 2019-12-19 | Stop reason: HOSPADM

## 2019-12-08 RX ADMIN — METFORMIN HYDROCHLORIDE 500 MG: 500 TABLET ORAL at 18:09

## 2019-12-08 RX ADMIN — CYCLOBENZAPRINE 10 MG: 10 TABLET, FILM COATED ORAL at 08:35

## 2019-12-08 RX ADMIN — CYCLOBENZAPRINE 10 MG: 10 TABLET, FILM COATED ORAL at 21:14

## 2019-12-08 RX ADMIN — ACETAMINOPHEN 1000 MG: 500 TABLET, FILM COATED ORAL at 18:09

## 2019-12-08 RX ADMIN — OMEPRAZOLE 20 MG: 20 CAPSULE, DELAYED RELEASE ORAL at 08:35

## 2019-12-08 RX ADMIN — METFORMIN HYDROCHLORIDE 500 MG: 500 TABLET ORAL at 08:35

## 2019-12-08 RX ADMIN — ONDANSETRON 4 MG: 4 TABLET, ORALLY DISINTEGRATING ORAL at 08:33

## 2019-12-08 RX ADMIN — OXYCODONE HYDROCHLORIDE 5 MG: 5 TABLET ORAL at 21:19

## 2019-12-08 RX ADMIN — ATORVASTATIN CALCIUM 20 MG: 10 TABLET, FILM COATED ORAL at 08:35

## 2019-12-08 RX ADMIN — INSULIN HUMAN 1 UNITS: 100 INJECTION, SOLUTION PARENTERAL at 21:14

## 2019-12-08 RX ADMIN — ACETAMINOPHEN 650 MG: 325 TABLET ORAL at 08:33

## 2019-12-08 RX ADMIN — ACETAMINOPHEN 1000 MG: 500 TABLET, FILM COATED ORAL at 11:52

## 2019-12-08 RX ADMIN — ENOXAPARIN SODIUM 40 MG: 40 INJECTION, SOLUTION INTRAVENOUS; SUBCUTANEOUS at 08:36

## 2019-12-08 RX ADMIN — FLUOXETINE HYDROCHLORIDE 10 MG: 20 TABLET, FILM COATED ORAL at 08:35

## 2019-12-08 RX ADMIN — TRAZODONE HYDROCHLORIDE 50 MG: 50 TABLET ORAL at 21:14

## 2019-12-08 RX ADMIN — CYCLOBENZAPRINE 10 MG: 10 TABLET, FILM COATED ORAL at 14:20

## 2019-12-08 ASSESSMENT — ENCOUNTER SYMPTOMS
ABDOMINAL PAIN: 0
NAUSEA: 0
CHILLS: 0
DIARRHEA: 0
VOMITING: 0
FEVER: 0
SHORTNESS OF BREATH: 0
NERVOUS/ANXIOUS: 0

## 2019-12-08 NOTE — PROGRESS NOTES
Received patient on bed. Awake alert and oriented. Patient on cervical collar at all times. Patient verbalized been having headache but will ask for prn later. No dizziness. Patient able to ambulate to the bathroom with contact guard assist. Patient verbalized no severe weakness. Safety and fall precaution reinforced.

## 2019-12-08 NOTE — THERAPY
Speech Language Pathology  Daily Treatment     Patient Name: Olga Lockhart  Age:  69 y.o., Sex:  female  Medical Record #: [id number]  Today's Date: 12/8/2019     Subjective    Pt tired, however, pleasant and cooperative during ST session.      Objective       12/08/19 1001   Cognition   Complex Attention Minimal (4)   Verbal Short Term Memory 45 Minutes   Medication Management  Minimal (4)   Interdisciplinary Plan of Care Collaboration   IDT Collaboration with  Certified Nursing Assistant   Patient Position at End of Therapy In Bed;Bed Alarm On;Call Light within Reach   Collaboration Comments CLOF   SLP Total Time Spent   SLP Individual Total Time Spent (Mins) 60   Charge Group   Charges Yes   SLP Cognitive Skill Development 4       FIM Comprehension Score:  7 - Independent  Comprehension Description:       FIM Expression Score:  7 - Independent  Expression Description:       FIM Social Interaction Score:  6 - Modified Independent  Social Interaction Description:  Increased time, Medication    FIM Problem Solving Score:  5 - Standby Prompting/Supervision or Set-up  Problem Solving Description:  Verbal cueing, Increased time    FIM Memory Score:  4 - Minimal Assistance  Memory Description:  Verbal cueing, Bed/chair alarm, Therapy schedule      Assessment    Pt completed medication management sort with mock pills with 75% acc IND, increased to 100% acc w/ MIN A. Initiated pt's memory book w/ pt completing partial information re: therapies w/ SPV cues. Initiated medication list as pt confused re: her current meds. Pt req'd MOD-MIN A to transfer info from MAR to med list.    Plan    Complete med list, address complex problem solving.

## 2019-12-08 NOTE — CARE PLAN
Problem: Safety  Goal: Will remain free from injury  Outcome: PROGRESSING AS EXPECTED  Note:   Reinforced safety precautions to pt. Encouraged pt to use call light when assistance is needed, call light is within easy reach, bed is locked and at lowest position.       Problem: Venous Thromboembolism (VTW)/Deep Vein Thrombosis (DVT) Prevention:  Goal: Patient will participate in Venous Thrombosis (VTE)/Deep Vein Thrombosis (DVT)Prevention Measures  Outcome: PROGRESSING AS EXPECTED  Flowsheets (Taken 12/7/2019 2836)  Pharmacologic Prophylaxis Used: LMWH: Enoxaparin(Lovenox)  Note:   Patient is on Lovenox for DVT prophylaxis.

## 2019-12-08 NOTE — CARE PLAN
Problem: Safety  Goal: Will remain free from injury  Outcome: PROGRESSING AS EXPECTED  Note:   Reinforced safety precautions to pt. Encouraged pt to use call light when assistance is needed, call light is within easy reach, bed is locked and at lowest position.       Problem: Bowel/Gastric:  Goal: Normal bowel function is maintained or improved  Outcome: PROGRESSING AS EXPECTED  Note:   Patient had BM today. C/o having loose stool. Refused bowel med and lactulose. Lactulose changed to PRN.

## 2019-12-08 NOTE — THERAPY
"Occupational Therapy  Daily Treatment     Patient Name: Olga Lockhart  Age:  69 y.o., Sex:  female  Medical Record #: [id number]  Today's Date: 2019     Precautions  Precautions: Fall Risk, Cervical Collar    Comments: C-collar on at all times,    Subjective    \"I want to be able to use my phone\" patient stated when asked re: her goals. Patient reported it has been challenging to text/call her family/friends due to decreased fine motor coordination.      Objective     19 1301   Precautions   Precautions Fall Risk;Cervical Collar     Comments C-collar on at all times,   Cognition    Orientation Level Oriented x 4   Level of Consciousness Alert   Fine Motor / Dexterity    Fine Motor / Dexterity Yes   Fine Motor / Dexterity Interventions Patient threaded lanyard through small beads with goal of facilitating indpendence with FMC tasks. Successfully completed task with increased time and occasional dropping of beads.    Comments  Additionally, patient performed theraputty exercises with focus on finger flexion/extension/opposition.    Interdisciplinary Plan of Care Collaboration   Patient Position at End of Therapy In Bed;Call Light within Reach   OT Total Time Spent   OT Individual Total Time Spent (Mins) 60   OT Charge Group   OT Self Care / ADL 2   OT Therapy Activity 2       FIM Grooming Score:  5 - Standby Prompting/Supervision or Set-up  Grooming Description:  Increased time, Set-up of equipment(Set-up for washing hands while seated in w/c)    FIM Toiletin - Minimal Assistance  Toileting Description:  Grab bar, Increased time, Supervision for safety, Set-up of equipment(Patient demonstrated ability to perform all toileting tasks at CGA level with use of grab bar for safety/balance.)    FIM Toilet Transfer Score:  4 - Minimal Assistance  Toilet Transfer Description:  Grab bar, Increased time, Supervision for safety, Set-up of equipment(Patient performed wc<>toilet txfr with CGA and use of grab bar " for balance/safety)    Patient practiced using her new cell phone with goal of communicating with family/friends. Utilized wrist weight to provide increased tactile input however no significant changes noted in performance. Educated patient on importance of slowing down when performing various tasks on phone (ie texting) and demonstrated voice-text feature to increase independence with phone use. Patient receptive to education and adaptive strategies.     Assessment    Patient tolerated OT session well with focus on ADLs, IADLs and FMC. Completed all therapy tasks to the best of her ability with minimal c/o pain (neck soreness only). Limited by decreased endurance and coordination.     Plan    ADLs/IADLs, coordination, functional mobility/transfers

## 2019-12-08 NOTE — THERAPY
Physical Therapy   Daily Treatment     Patient Name: Olga Lockhart  Age:  69 y.o., Sex:  female  Medical Record #: [id number]  Today's Date: 12/8/2019     Precautions  Precautions: (P) Fall Risk, Cervical Collar    Comments: (P) Cervical collar on at all times + shower and sleeping; safety    Subjective    Patient questioning d/c plan and if she will go stay with family prior to living on her own in [place] again; self-limiting with all mobility activities     Objective       12/08/19 0831   Precautions   Precautions Fall Risk;Cervical Collar     Comments Cervical collar on at all times + shower and sleeping; safety   Sitting Lower Body Exercises   Sitting Lower Body Exercises Yes   Ankle Pumps 1 set of 15;Bilateral   Hip Abduction 1 set of 15;Bilateral   Hip Adduction 1 set of 15;Bilateral   Long Arc Quad 1 set of 15;Bilateral   Marching 1 set of 15;Reciprocal   Hamstring Curl 1 set of 15;Bilateral   Other Exercises Motomed 5min forward for LE endurance   PT Total Time Spent   PT Individual Total Time Spent (Mins) 60   PT Charge Group   Charges Yes   PT Gait Training 1   PT Therapeutic Exercise 1   PT Therapeutic Activities 2       FIM Bed/Chair/Wheelchair Transfers Score: 5 - Standby Prompting/Supervision or Set-up  Bed/Chair/Wheelchair Transfers Description:  (SBA SPT, SBA sit to supine using elevated HOB)    FIM Walking Score:  2 - Max Assistance  Walking Description:  (100ft CGA gait belt, limited by bilateral LE fatigue)    FIM Wheelchair Score:  1 - Total Assistance  Wheelchair Description:  (40ft using bilateral UE/LE; fatigues quickly; requires constant cues for w/c skills)    FIM Stairs Score:  2 - Max Assistance  Stairs Description:  (up/down 4 6in stairs bilateral rails CGA)      Assessment    Ambulation distance limited by general fatigue    Plan    Continue endurance work; progress ambulation distance, reinforce safe stair sequencing; begin family training or review goals for mod I d/c

## 2019-12-09 LAB
GLUCOSE BLD-MCNC: 129 MG/DL (ref 65–99)
GLUCOSE BLD-MCNC: 143 MG/DL (ref 65–99)
GLUCOSE BLD-MCNC: 161 MG/DL (ref 65–99)
GLUCOSE BLD-MCNC: 178 MG/DL (ref 65–99)

## 2019-12-09 PROCEDURE — 97116 GAIT TRAINING THERAPY: CPT

## 2019-12-09 PROCEDURE — 770010 HCHG ROOM/CARE - REHAB SEMI PRIVAT*

## 2019-12-09 PROCEDURE — 700102 HCHG RX REV CODE 250 W/ 637 OVERRIDE(OP): Performed by: PHYSICAL MEDICINE & REHABILITATION

## 2019-12-09 PROCEDURE — G0515 COGNITIVE SKILLS DEVELOPMENT: HCPCS

## 2019-12-09 PROCEDURE — 700111 HCHG RX REV CODE 636 W/ 250 OVERRIDE (IP): Performed by: PHYSICAL MEDICINE & REHABILITATION

## 2019-12-09 PROCEDURE — 700101 HCHG RX REV CODE 250: Performed by: PHYSICAL MEDICINE & REHABILITATION

## 2019-12-09 PROCEDURE — 99232 SBSQ HOSP IP/OBS MODERATE 35: CPT | Performed by: PHYSICAL MEDICINE & REHABILITATION

## 2019-12-09 PROCEDURE — 82962 GLUCOSE BLOOD TEST: CPT | Mod: 91

## 2019-12-09 PROCEDURE — 97530 THERAPEUTIC ACTIVITIES: CPT

## 2019-12-09 PROCEDURE — A9270 NON-COVERED ITEM OR SERVICE: HCPCS | Performed by: HOSPITALIST

## 2019-12-09 PROCEDURE — 700102 HCHG RX REV CODE 250 W/ 637 OVERRIDE(OP): Performed by: HOSPITALIST

## 2019-12-09 PROCEDURE — 99232 SBSQ HOSP IP/OBS MODERATE 35: CPT | Performed by: HOSPITALIST

## 2019-12-09 PROCEDURE — 97535 SELF CARE MNGMENT TRAINING: CPT

## 2019-12-09 PROCEDURE — A9270 NON-COVERED ITEM OR SERVICE: HCPCS | Performed by: PHYSICAL MEDICINE & REHABILITATION

## 2019-12-09 RX ADMIN — METFORMIN HYDROCHLORIDE 500 MG: 500 TABLET ORAL at 17:10

## 2019-12-09 RX ADMIN — SENNOSIDES AND DOCUSATE SODIUM 2 TABLET: 8.6; 5 TABLET ORAL at 21:07

## 2019-12-09 RX ADMIN — FLUOXETINE HYDROCHLORIDE 10 MG: 20 TABLET, FILM COATED ORAL at 08:07

## 2019-12-09 RX ADMIN — OXYCODONE HYDROCHLORIDE 5 MG: 5 TABLET ORAL at 07:32

## 2019-12-09 RX ADMIN — INSULIN HUMAN 1 UNITS: 100 INJECTION, SOLUTION PARENTERAL at 11:31

## 2019-12-09 RX ADMIN — CYCLOBENZAPRINE 10 MG: 10 TABLET, FILM COATED ORAL at 21:07

## 2019-12-09 RX ADMIN — CYCLOBENZAPRINE 10 MG: 10 TABLET, FILM COATED ORAL at 08:08

## 2019-12-09 RX ADMIN — METFORMIN HYDROCHLORIDE 500 MG: 500 TABLET ORAL at 08:09

## 2019-12-09 RX ADMIN — INSULIN HUMAN 1 UNITS: 100 INJECTION, SOLUTION PARENTERAL at 17:11

## 2019-12-09 RX ADMIN — CYCLOBENZAPRINE 10 MG: 10 TABLET, FILM COATED ORAL at 15:13

## 2019-12-09 RX ADMIN — LIDOCAINE 2 PATCH: 50 PATCH TOPICAL at 08:17

## 2019-12-09 RX ADMIN — OXYCODONE HYDROCHLORIDE 5 MG: 5 TABLET ORAL at 21:07

## 2019-12-09 RX ADMIN — ENOXAPARIN SODIUM 40 MG: 40 INJECTION, SOLUTION INTRAVENOUS; SUBCUTANEOUS at 08:14

## 2019-12-09 RX ADMIN — INSULIN HUMAN 1 UNITS: 100 INJECTION, SOLUTION PARENTERAL at 20:57

## 2019-12-09 RX ADMIN — LISINOPRIL 10 MG: 5 TABLET ORAL at 08:09

## 2019-12-09 RX ADMIN — OXYCODONE HYDROCHLORIDE 5 MG: 5 TABLET ORAL at 15:13

## 2019-12-09 RX ADMIN — ATORVASTATIN CALCIUM 20 MG: 10 TABLET, FILM COATED ORAL at 08:08

## 2019-12-09 RX ADMIN — OMEPRAZOLE 20 MG: 20 CAPSULE, DELAYED RELEASE ORAL at 08:09

## 2019-12-09 RX ADMIN — TRAZODONE HYDROCHLORIDE 50 MG: 50 TABLET ORAL at 21:07

## 2019-12-09 RX ADMIN — SENNOSIDES AND DOCUSATE SODIUM 2 TABLET: 8.6; 5 TABLET ORAL at 08:07

## 2019-12-09 ASSESSMENT — PATIENT HEALTH QUESTIONNAIRE - PHQ9
2. FEELING DOWN, DEPRESSED, IRRITABLE, OR HOPELESS: NOT AT ALL
SUM OF ALL RESPONSES TO PHQ9 QUESTIONS 1 AND 2: 0
1. LITTLE INTEREST OR PLEASURE IN DOING THINGS: NOT AT ALL
2. FEELING DOWN, DEPRESSED, IRRITABLE, OR HOPELESS: NOT AT ALL
1. LITTLE INTEREST OR PLEASURE IN DOING THINGS: NOT AT ALL
SUM OF ALL RESPONSES TO PHQ9 QUESTIONS 1 AND 2: 0
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
## 2019-12-09 NOTE — CARE PLAN
Problem: Safety  Goal: Will remain free from injury  Outcome: PROGRESSING AS EXPECTED  Note:   Pt uses call light  appropriately. Waits for assistance and does not attempt self transfer this shift. Able to verbalize needs.      Problem: Pain Management  Goal: Pain level will decrease to patient's comfort goal  Outcome: PROGRESSING AS EXPECTED  Note:   Roxicodone 5mg given PRN per MAR for c/o headache with adequate relief. Pt sleeps well with requested trazodone. Will continue to monitor.

## 2019-12-09 NOTE — THERAPY
Occupational Therapy  Daily Treatment     Patient Name: Olga Lockhart  Age:  69 y.o., Sex:  female  Medical Record #: [id number]  Today's Date: 12/9/2019     Precautions  Precautions: (P) Cervical Collar  , Spinal / Back Precautions , Fall Risk  Comments: (P) C-collar on at all times,      Subjective    Patient agreeable to participate in OT.      Objective       12/09/19 1431   Precautions   Precautions Cervical Collar  ;Spinal / Back Precautions ;Fall Risk   Comments C-collar on at all times,   Hand Strengthening   Comment Patient grasped and placed clothespins of various resistance on vertical and horizontal metal dowels to facilitate BUE strengthening/FMC. Able to complete task with all clothespins however required use of BUEs to complete with black clothespins.    Fine Motor / Dexterity    Comments  Patient successfully completed nuts and bolts FMC activity using BUEs.    Interdisciplinary Plan of Care Collaboration   Patient Position at End of Therapy In Bed;Call Light within Reach;Tray Table within Reach   OT Total Time Spent   OT Individual Total Time Spent (Mins) 30   OT Charge Group   OT Therapy Activity 2     Patient performed wc>bed txfr with SBA.     Assessment    Patient tolerated OT session well with focus on UE strengthening and FMC. Completed therapeutic activities to the best of her ability. Required use of BUEs to complete clothespin activity with clothespins of highest resistance.     Plan    ADL/IADLs , related mobility  , strength/endurance building  Incorporating C spine precautions, bilateral FMC  actiivty

## 2019-12-09 NOTE — PROGRESS NOTES
HS [id number]. Humulin R 1 unit given per sliding scale per MAR. HS snack provided and consumed. Will continue to monitor.

## 2019-12-09 NOTE — DISCHARGE PLANNING
Met w/ patient to complete interview assessment attempted Friday. Patient A&O, talkative, just completed therapies for the day. Lives alone in [place] in pre-nikole home, SSH w/ 4 VASYL. Has 2 dogs & 3 cats at home (neighbor taking care of them). Independent, active lifestyle. Has 2 adult sons, one in [place], the other in [place]. Unable to stay w/ son & DIL post acute, but will be able to stay w/ 's family in [place] until able to live alone in [place]. Asked me to contact DIL for name & address in [place]. Has [facility] & SPC available to use ( 's DME). No previous services for patient. [facility] for  prior to his passing from esophageal cancer. Reviewed IDT conference & CM role. Questions answered. Emotional support provided.

## 2019-12-09 NOTE — THERAPY
"Occupational Therapy  Daily Treatment     Patient Name: Olga Lockhart  Age:  69 y.o., Sex:  female  Medical Record #: [id number]  Today's Date: 12/9/2019     Precautions  Precautions: Cervical Collar  , Spinal / Back Precautions , Fall Risk  Comments: C-collar on at all times,         Subjective      \"  I need some help with my phone.\"     Objective       12/09/19 0931   Precautions   Precautions Cervical Collar  ;Spinal / Back Precautions ;Fall Risk   Comments C-collar on at all times,   Interdisciplinary Plan of Care Collaboration   Patient Position at End of Therapy Seated;Self Releasing Lap Belt Applied  (seated at sink brushing hair  waiting to start ST tx )   OT Total Time Spent   OT Individual Total Time Spent (Mins) 30   OT Charge Group   OT Self Care / ADL 1   OT Therapy Activity 1       FIM Grooming Score:  5 - Standby Prompting/Supervision or Set-up  Grooming Description:  (oral care,  brush hair.  hair is matted in back  attempted to detangle  but no detangler spray to be found for use )     Bed to w/c transfer    Supervision    Min cueing for talk to text  Feature use of phone to send a text to her friend   Assessment     completed tx no complaints     Plan     ADL , IADL , related mobility  , strength/endurance building  Incorporating C spine precautions      Bilateral FMC  actiivty    "
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
## 2019-12-10 PROBLEM — R79.89 AZOTEMIA: Status: ACTIVE | Noted: 2019-12-10

## 2019-12-10 PROBLEM — F32.A DEPRESSION: Status: ACTIVE | Noted: 2019-12-10

## 2019-12-10 LAB
GLUCOSE BLD-MCNC: 143 MG/DL (ref 65–99)
GLUCOSE BLD-MCNC: 157 MG/DL (ref 65–99)
GLUCOSE BLD-MCNC: 158 MG/DL (ref 65–99)
GLUCOSE BLD-MCNC: 170 MG/DL (ref 65–99)

## 2019-12-10 PROCEDURE — 770010 HCHG ROOM/CARE - REHAB SEMI PRIVAT*

## 2019-12-10 PROCEDURE — 97116 GAIT TRAINING THERAPY: CPT

## 2019-12-10 PROCEDURE — 97112 NEUROMUSCULAR REEDUCATION: CPT

## 2019-12-10 PROCEDURE — 700101 HCHG RX REV CODE 250: Performed by: PHYSICAL MEDICINE & REHABILITATION

## 2019-12-10 PROCEDURE — 97530 THERAPEUTIC ACTIVITIES: CPT

## 2019-12-10 PROCEDURE — A9270 NON-COVERED ITEM OR SERVICE: HCPCS | Performed by: HOSPITALIST

## 2019-12-10 PROCEDURE — 700111 HCHG RX REV CODE 636 W/ 250 OVERRIDE (IP): Performed by: PHYSICAL MEDICINE & REHABILITATION

## 2019-12-10 PROCEDURE — 700102 HCHG RX REV CODE 250 W/ 637 OVERRIDE(OP): Performed by: PHYSICAL MEDICINE & REHABILITATION

## 2019-12-10 PROCEDURE — 97535 SELF CARE MNGMENT TRAINING: CPT

## 2019-12-10 PROCEDURE — 99233 SBSQ HOSP IP/OBS HIGH 50: CPT | Performed by: PHYSICAL MEDICINE & REHABILITATION

## 2019-12-10 PROCEDURE — 82962 GLUCOSE BLOOD TEST: CPT | Mod: 91

## 2019-12-10 PROCEDURE — 700102 HCHG RX REV CODE 250 W/ 637 OVERRIDE(OP): Performed by: HOSPITALIST

## 2019-12-10 PROCEDURE — 99232 SBSQ HOSP IP/OBS MODERATE 35: CPT | Performed by: HOSPITALIST

## 2019-12-10 PROCEDURE — G0515 COGNITIVE SKILLS DEVELOPMENT: HCPCS

## 2019-12-10 PROCEDURE — A9270 NON-COVERED ITEM OR SERVICE: HCPCS | Performed by: PHYSICAL MEDICINE & REHABILITATION

## 2019-12-10 RX ORDER — DEXTROSE MONOHYDRATE 25 G/50ML
50 INJECTION, SOLUTION INTRAVENOUS
Status: DISCONTINUED | OUTPATIENT
Start: 2019-12-10 | End: 2019-12-12

## 2019-12-10 RX ADMIN — ACETAMINOPHEN 650 MG: 325 TABLET ORAL at 21:17

## 2019-12-10 RX ADMIN — METFORMIN HYDROCHLORIDE 500 MG: 500 TABLET ORAL at 08:26

## 2019-12-10 RX ADMIN — CYCLOBENZAPRINE 10 MG: 10 TABLET, FILM COATED ORAL at 08:27

## 2019-12-10 RX ADMIN — SENNOSIDES AND DOCUSATE SODIUM 2 TABLET: 8.6; 5 TABLET ORAL at 08:26

## 2019-12-10 RX ADMIN — LIDOCAINE 2 PATCH: 50 PATCH TOPICAL at 08:30

## 2019-12-10 RX ADMIN — OXYCODONE HYDROCHLORIDE 5 MG: 5 TABLET ORAL at 19:22

## 2019-12-10 RX ADMIN — ATORVASTATIN CALCIUM 20 MG: 10 TABLET, FILM COATED ORAL at 08:27

## 2019-12-10 RX ADMIN — OMEPRAZOLE 20 MG: 20 CAPSULE, DELAYED RELEASE ORAL at 08:27

## 2019-12-10 RX ADMIN — LISINOPRIL 10 MG: 5 TABLET ORAL at 08:27

## 2019-12-10 RX ADMIN — ENOXAPARIN SODIUM 40 MG: 40 INJECTION, SOLUTION INTRAVENOUS; SUBCUTANEOUS at 08:26

## 2019-12-10 RX ADMIN — SENNOSIDES AND DOCUSATE SODIUM 2 TABLET: 8.6; 5 TABLET ORAL at 19:22

## 2019-12-10 RX ADMIN — METFORMIN HYDROCHLORIDE 500 MG: 500 TABLET ORAL at 17:21

## 2019-12-10 RX ADMIN — FLUOXETINE HYDROCHLORIDE 10 MG: 20 TABLET, FILM COATED ORAL at 08:26

## 2019-12-10 RX ADMIN — CYCLOBENZAPRINE 10 MG: 10 TABLET, FILM COATED ORAL at 19:21

## 2019-12-10 RX ADMIN — CYCLOBENZAPRINE 10 MG: 10 TABLET, FILM COATED ORAL at 14:34

## 2019-12-10 RX ADMIN — ONDANSETRON 4 MG: 4 TABLET, ORALLY DISINTEGRATING ORAL at 21:12

## 2019-12-10 ASSESSMENT — ENCOUNTER SYMPTOMS
NAUSEA: 0
SHORTNESS OF BREATH: 0
ABDOMINAL PAIN: 0
EYES NEGATIVE: 1
VOMITING: 0
COUGH: 0
PALPITATIONS: 0
CHILLS: 0
FEVER: 0

## 2019-12-10 ASSESSMENT — PATIENT HEALTH QUESTIONNAIRE - PHQ9
SUM OF ALL RESPONSES TO PHQ9 QUESTIONS 1 AND 2: 0
1. LITTLE INTEREST OR PLEASURE IN DOING THINGS: NOT AT ALL
1. LITTLE INTEREST OR PLEASURE IN DOING THINGS: NOT AT ALL
2. FEELING DOWN, DEPRESSED, IRRITABLE, OR HOPELESS: NOT AT ALL
SUM OF ALL RESPONSES TO PHQ9 QUESTIONS 1 AND 2: 0
2. FEELING DOWN, DEPRESSED, IRRITABLE, OR HOPELESS: NOT AT ALL

## 2019-12-10 NOTE — REHAB-CM IDT TEAM NOTE
Case Management    DC Planning  DC destination/dispostion: Home w/ in-laws to [place]. Need to verify address & phone info for post acute.  Referrals: TBD.  DC Needs: DME for patient safety & mobility. HH vs OP therapy. MD f/u appts.  Barriers to discharge: Functional mobility deficits. Cognitive deficits[place]. Pain mgmt.    Strengths: Motivation. Supportive family. PLOF: independent, active lifestyle.     Section completed by:  Cristine Roldan R.N.

## 2019-12-10 NOTE — REHAB-ACTIVITY IDT TEAM NOTE
ACT   Recreation/Community     Leisure Competence Measure  Leisure Awareness: Independent  Leisure Attitude: Independent  Leisure Skills: Minimal Assist  Cultural / Social Behaviors: Independent  Interpersonal Skills: Independent  Community Integration Skills: Modified Independent  Social Contact: Independent  Community Participation: Independent    Strengths:  Able to follow instructions, Alert and oriented, Good insight into deficits/needs, Motivated for self care and independence, Pleasant and cooperative and Willingly participates in therapeutic activities  Barriers:  Decreased endurance, Generalized weakness, Pain poorly managed and Poor activity tolerance  # of short term goals set=2  # of short term goals met=0    Pt requires reminders to position her neck in a proper position. Pt encouraged to ask for the table to be raised so that she would be able to keep things at a higher level and not look down. Looking down limits her endurance with neck pain. Motivated to increase her fine motor skills and is open to new activities reinforcing this.     Recreation Therapy Problems     Problem: Recreation Therapy     Dates: Start: 12/10/19       Description:     Goal: STG-Within one week, patient will actively engage in planning of community re-integration outing     Dates: Start: 12/10/19       Description:           Goal: STG-Within one week, patient will     Dates: Start: 12/10/19       Description: Be able to  and hold items using fine motor skills for 15 minutes of the 30 minute session.            Goal: LTG-By discharge, patient will participate in a community re-integration outing     Dates: Start: 12/10/19       Description:           Goal: LTG-By discharge, patient will     Dates: Start: 12/10/19       Description: Be able to  and hold items using fine motor skills for 25 minutes of the 30 minute session.                        Section completed by:  YURI Vincent
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
## 2019-12-10 NOTE — REHAB-PHARMACY IDT TEAM NOTE
Pharmacy   Pharmacy  Antibiotics/Antifungals/Antivirals:  Medication:      Active Orders (From admission, onward)    None        Route:        NA  Stop Date:  NA  Reason:      NA  Antihypertensives/Cardiac:  Medication:    Orders (72h ago, onward)     Start     Ordered    12/07/19 0900  lisinopril (PRINIVIL) tablet 10 mg  DAILY      12/06/19 1703    12/06/19 0900  atorvastatin (LIPITOR) tablet 20 mg  DAILY      12/05/19 1531    12/05/19 1531  hydrALAZINE (APRESOLINE) tablet 25 mg  EVERY 8 HOURS PRN      12/05/19 1531              Patient Vitals for the past 24 hrs:   BP Pulse   12/10/19 0700 118/76 85   12/09/19 1820 108/73 99   12/09/19 1437 109/61 (!) 105     Anticoagulation:  Medication: Lovenox                               Other key medications: A review of the medication list reveals no issues at this time. Patient is currently on antihypertensive(s). Recommend home blood pressure monitoring/recording if antihypertensive(s) regimen(s) continue. Patient is currently on diabetic medication(s) and/or Insulin(s). Recommend home blood glucose monitoring/recording if these regimen(s) continue.    Section completed by: Milo Marti [facility]
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
## 2019-12-10 NOTE — THERAPY
Speech Language Pathology  Daily Treatment     Patient Name: Olga Lockhart  Age:  69 y.o., Sex:  female  Medical Record #: [id number]  Today's Date: 12/10/2019     Subjective    Pt was pleasant and cooperative during this ST session      Objective       12/10/19 1001   SLP Total Time Spent   SLP Individual Total Time Spent (Mins) 60   Charge Group   SLP Cognitive Skill Development 4       Assessment    Finished reviewing pt's current medications.  Pt was able to recall the purpose of approximately 75% of her medications when given the names.  Pt completed a functional medication sorting task with her current medications independently with 2 errors noted (one medication sorted at bedtime instead of morning and another medications sorted at breakfast and bedtime vs breakfast and dinner).      Plan    Pt would benefit from completing this task a second time

## 2019-12-10 NOTE — CARE PLAN
Problem: Communication  Goal: The ability to communicate needs accurately and effectively will improve  Outcome: PROGRESSING AS EXPECTED     Problem: Safety  Goal: Will remain free from injury  Outcome: PROGRESSING AS EXPECTED  Goal: Will remain free from falls  Outcome: PROGRESSING AS EXPECTED     Problem: Infection  Goal: Will remain free from infection  Outcome: PROGRESSING AS EXPECTED     Problem: Venous Thromboembolism (VTW)/Deep Vein Thrombosis (DVT) Prevention:  Goal: Patient will participate in Venous Thrombosis (VTE)/Deep Vein Thrombosis (DVT)Prevention Measures  Outcome: PROGRESSING AS EXPECTED     Problem: Bowel/Gastric:  Goal: Normal bowel function is maintained or improved  Outcome: PROGRESSING AS EXPECTED  Goal: Will not experience complications related to bowel motility  Outcome: PROGRESSING AS EXPECTED     Problem: Knowledge Deficit  Goal: Knowledge of disease process/condition, treatment plan, diagnostic tests, and medications will improve  Outcome: PROGRESSING AS EXPECTED  Goal: Knowledge of the prescribed therapeutic regimen will improve  Outcome: PROGRESSING AS EXPECTED     Problem: Discharge Barriers/Planning  Goal: Patient's continuum of care needs will be met  Outcome: PROGRESSING AS EXPECTED     Problem: Pain Management  Goal: Pain level will decrease to patient's comfort goal  Outcome: PROGRESSING AS EXPECTED     Problem: Skin Integrity  Goal: Risk for impaired skin integrity will decrease  Outcome: PROGRESSING AS EXPECTED

## 2019-12-10 NOTE — PROGRESS NOTES
Report received from previous shift. Patient lying quietly in bed, complained of some pain, see MAR. Patient was cooperative with assessment and medication administration, but per CNA she refused her bath. Will continue to monitor.
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
## 2019-12-10 NOTE — CARE PLAN
Problem: Mobility Transfers  Goal: STG-Within one week, patient will move supine to sit  Description  1) Individualized goal: Min A, c/s collar, cueing  2) Interventions: PT Group Therapy, PT Gait Training, PT Therapeutic Exercises, PT TENS Application, PT Neuro Re-Ed/Balance, PT Aquatic Therapy, PT Therapeutic Activity and PT Manual Therapy         Outcome: MET     Problem: Mobility  Goal: STG-Within one week, patient will ambulate community distances  Description  1) Individualized goal: gait belt, SBA, 200 feet, c/s collar, cueing  2) Interventions: PT Group Therapy, PT Gait Training, PT Therapeutic Exercises, PT TENS Application, PT Neuro Re-Ed/Balance, PT Aquatic Therapy, PT Therapeutic Activity and PT Manual Therapy     Outcome: NOT MET  Note:   Patient uses Min A to CGA for balance at gait belt with ambulation  Goal: STG-Within one week, patient will ascend and descend four to six stairs  Description  1) Individualized goal: gait belt, SPV, 1-2 railings, c/s collar  2) Interventions: PT Group Therapy, PT Gait Training, PT Therapeutic Exercises, PT TENS Application, PT Neuro Re-Ed/Balance, PT Aquatic Therapy, PT Therapeutic Activity and PT Manual Therapy       12/10/2019 0848 by Todd Preciado, PT  Outcome: NOT MET  Note:   Patient uses CGA for safety with stairs but has been able to ascend up to 12 stairs in 1 rep.     Problem: Mobility Transfers  Goal: STG-Within one week, patient will perform bed mobility  Description  1) Individualized goal: Sit to supine with SPV, c/s collar, cueing  2) Interventions: PT Group Therapy, PT Gait Training, PT Therapeutic Exercises, PT TENS Application, PT Neuro Re-Ed/Balance, PT Aquatic Therapy, PT Therapeutic Activity and PT Manual Therapy       Outcome: NOT MET  Note:   Patient uses SBA currently with sit to supine.  Goal: STG-Within one week, patient will sit to stand  Description  1) Individualized goal: gait belt, SBA, c/s collar  2) Interventions: PT Group  Therapy, PT Gait Training, PT Therapeutic Exercises, PT TENS Application, PT Neuro Re-Ed/Balance, PT Aquatic Therapy, PT Therapeutic Activity and PT Manual Therapy     Outcome: NOT MET  Note:   Patient uses up to Min A at this time.

## 2019-12-10 NOTE — CARE PLAN
Problem: Safety  Goal: Will remain free from injury  Outcome: PROGRESSING AS EXPECTED   Pt uses call light consistently and appropriately. Waits for assistance does not attempt self transfer this shift. Able to verbalize needs. Patient on Lovenox therapy for DVT prophylaxis.   Problem: Pain Management  Goal: Pain level will decrease to patient's comfort goal  Outcome: PROGRESSING AS EXPECTED   Patient reports satisfactory pain control and decrease intensity after pharmacological pain management.

## 2019-12-10 NOTE — REHAB-OT IDT TEAM NOTE
Occupational Therapy   Activities of Daily Living  Eating Initial:  6 - Modified Independent  Eating Current:  6 - Modified Independent   Eating Description:  Increased time  Grooming Initial:  4 - Minimal Assistance  Grooming Current:  5 - Standby Prompting/Supervision or Set-up   Grooming Description:  Increased time, Supervision for safety(Sup/SBA for standing at sinkside for groom/hygiene/oral and hair care)  Bathing Initial:  3 - Moderate Assistance  Bathing Current:  5 - Standby Prompting/Supervision or Set-up   Bathing Description:  Grab bar, Tub bench, Long handled bath tool, Hand held shower, Increased time, Supervision for safety, Verbal cueing, Set-up of equipment, Initial preparation for task(Total assist to don/doff cervical collar for shower, Sup/SBA for U/LB wash/rinse via shower wand and long handled sponge.  SBA in stand via grab bar for kodi)  Upper Body Dressing Initial:  4 - Minimal Assistance  Upper Body Dressing Current:  5 - Standby Prompting/Supervision or Set-up   Upper Body Dressing Description:  Increased time, Supervision for safety, Verbal cueing, Application of orthotic or brace(Mod I to manage pull over shirt, required assist to manage cervical collar)  Lower Body Dressing Initial:  4 - Minimal Assistance  Lower Body Dressing Current:  4 - Minimal Assistance   Lower Body Dressing Description:  4 - Minimal Assistance  Toileting Initial:  4 - Minimal Assistance  Toileting Current:  5 - Standby Prompting/Supervision or Set-up   Toileting Description:  Grab bar, Increased time, Supervision for safety, Verbal cueing, Set-up of equipment, Initial preparation for task(SBA for LB clothing management in stand via grab bar.  Mod I for toilet hygiene.)  Toilet Transfer Initial:  4 - Minimal Assistance  Toilet Transfer Current:  5 - Standby Prompting/Supervision or Set-up   Toilet Transfer Description:  5 - Standby Prompting/Supervision or Set-up  Tub / Shower Transfer Initial:  4 - Minimal  Assistance  Tub / Shower Transfer Current:  5 - Standby Prompting/Supervision or Set-up   Tub / Shower Transfer Description:  Grab bar, Increased time, Supervision for safety, Initial preparation for task(Sup/SBA for SPT to/frommanual wc and shower bench via grab bar)  IADL:  TBD   Family Training/Education:  TBD   DME/DC Recommendations:  TBD     Strengths:  Alert and oriented, Motivated for self care and independence, Pleasant and cooperative and Willingly participates in therapeutic activities  Barriers:  Decreased endurance, Generalized weakness and Limited mobility     # of short term goals set= 6    # of short term goals met= 2     Occupational Therapy Goals     Problem: Dressing     Dates: Start: 12/06/19       Description:     Goal: STG-Within one week, patient will dress UB     Dates: Start: 12/06/19       Description: 1) Individualized Goal:  Mod I for UB clothing management  2) Interventions:  OT Self Care/ADL, OT Neuro Re-Ed/Balance, OT Therapeutic Activity, OT Evaluation and OT Therapeutic Exercise       Note:     Goal Note filed on 12/10/19 1302 by Madi Wen MS,OTR/L    Pt required Sup/SBA for UB clothing management                  Goal: STG-Within one week, patient will dress LB     Dates: Start: 12/06/19       Description: 1) Individualized Goal:  Sup/SBA for LB clothing management via AE/DME PRN  2) Interventions:  OT Self Care/ADL, OT Neuro Re-Ed/Balance, OT Therapeutic Activity, OT Evaluation and OT Therapeutic Exercise       Note:     Goal Note filed on 12/10/19 1302 by Madi Wen MS,OTR/L    Pt required CGA + VQ's for safety for LB clothing management                        Problem: IADL's     Dates: Start: 12/06/19       Description:     Goal: STG-Within one week, patient will prepare a meal     Dates: Start: 12/06/19       Description: 1) Individualized Goal:  Mod I for simple meal prep via AE/DME pRN  2) Interventions:  OT Self Care/ADL, OT Neuro Re-Ed/Balance, OT Therapeutic Activity,  OT Evaluation and OT Therapeutic Exercise       Note:     Goal Note filed on 12/10/19 1302 by Madi Wen MS,OTR/L    To be conducted                  Goal: STG-Within one week, patient will     Dates: Start: 12/06/19       Description: 1) Individualized Goal:  Mod I to manage cervical collar + change pads out.  2) Interventions:  OT Self Care/ADL, OT Neuro Re-Ed/Balance, OT Therapeutic Activity, OT Evaluation and OT Therapeutic Exercise       Note:     Goal Note filed on 12/10/19 1302 by Madi Wen MS,OTR/L      Pt at mod assist for cervical collar management                          Problem: OT Long Term Goals     Dates: Start: 12/06/19       Description:     Goal: LTG-By discharge, patient will complete basic self care tasks     Dates: Start: 12/06/19       Description: 1) Individualized Goal:  Mod I for BADL's via AE/DME PRN  2) Interventions:  OT Self Care/ADL, OT Neuro Re-Ed/Balance, OT Therapeutic Activity, OT Evaluation and OT Therapeutic Exercise             Goal: LTG-By discharge, patient will perform bathroom transfers     Dates: Start: 12/06/19       Description: 1) Individualized Goal:  Mod I for bathroom transfer via DME pRN  2) Interventions:  OT Self Care/ADL, OT Neuro Re-Ed/Balance, OT Therapeutic Activity, OT Evaluation and OT Therapeutic Exercise                         Section completed by:  Madi Wen MS,OTR/L

## 2019-12-10 NOTE — REHAB-NURSING IDT TEAM NOTE
Nursing   Nursing  Diet/Nutrition:  Diabetic  Medication Administration:  Whole with Liquid Wash  % consumed at meals in last 24 hours:    Snack schedule:  HS  Fluid Intake/Output in past 24 hours: In: 120 [P.O.:120]  Out: -   Admit Weight:  Weight: 85.5 kg (188 lb 7.9 oz)  Weight Last 7 Days   [83.6 kg (184 lb 4.9 oz)-85.5 kg (188 lb 7.9 oz)] 83.6 kg (184 lb 4.9 oz) (12/08 1500)    Pain Issues:    Location:  Head (12/09 0740)  Mid (12/09 0740)         Severity:  Moderate   Scheduled pain medications:  None     PRN pain medications used in last 24 hours:  oxycodone immediate release (ROXICODONE)    Non Pharmacologic Interventions:  emotional support, environmental changes and repositioned  Effectiveness of pain management plan:  good=patient states acceptable comfort after interventions    Bowel:    Bowel Assist Initial Score:  4 - Minimal Assistance  Bowel Assist Current Score:  4 - Minimal Assistance  Bowl Accidents in last 7 days:     Last bowel movement: 12/08/19  Stool Description: Medium     Usual bowel pattern:  daily  Scheduled bowel medications:  senna-docusate (PERICOLACE or SENOKOT S)   PRN bowel medications used in last 24 hours:  None  Nursing Interventions:  Increased time, PRN medication, Scheduled medication, Supervision, Verbal cueing, Positioning on commode/toilet, Brief  Effectiveness of bowel program:   good=regular, predictable, controlled emptying of bowel  Bladder:    Bladder Assist Initial Score:  4 - Minimal Assistance  Bladder Assist Current Score:  4 - Minimal Assistance  Bladder Accidents in last 7 days:     PVR range for past 24-48 hours:  [5-20]  ()  Medications affecting bladder:  None    Time void schedule/voiding pattern:  Time void every 3 hours during the day and every 4 hours at night  Interventions:  Increased time, Supervision, Verbal cueing, Brief  Effectiveness of bladder training:  Good=regular, predictable, emptying of bladder, patient initiates time  voiding      Diabetes:  Blood Sugar Frequency:  Before meals and at bedtime    Range of BS for last 48 hours:     Recent Labs     12/08/19  0707 12/08/19  1116 12/08/19  1710 12/08/19  2112 12/09/19  0727 12/09/19  1129 12/09/19  1709 12/09/19 2057   POCGLUCOSE 129* 129* 134* 181* 143* 158* 161* 178*     Scheduled diabetic medications:  metformin (GLUCOPHAGE)  and insulin lispro (HUMALOG) injection   Sliding scale usage in past 24 hours:  Yes  Total Short acting insulin in the past 24 hours:  Humulin 2 units  Total Long acting insulin in the past 24 hours:  None    Wound:         Patient Lines/Drains/Airways Status    Active Current Wounds     Name: Placement date: Placement time: Site: Days:    Wound 12/04/19 Laceration Head  12/04/19   0000   --  6                   Interventions:  FRANTZ, clean, monitor    Sleep/wake cycle:   Average hours slept:  Sleeps greater than 6 hours without waking  Sleep medication usage:  Other trazodone    Patient/Family Training/Education:  Fall Prevention, Medication Management and Safety  Discharge Supply Recommendations:  Glucometer and Strips  Strengths: Alert and oriented, Able to follow instructions, Supportive family, Pleasant and cooperative, Effective communication skills and Good carryover of learning   Barriers:   Generalized weakness and Limited mobility        Nursing Problems     Problem: Bowel/Gastric:     Description:     Goal: Normal bowel function is maintained or improved     Description:           Goal: Will not experience complications related to bowel motility     Description:                 Problem: Communication     Description:     Goal: The ability to communicate needs accurately and effectively will improve     Description:                 Problem: Discharge Barriers/Planning     Description:     Goal: Patient's continuum of care needs will be met     Description:                 Problem: Infection     Description:     Goal: Will remain free from infection      Description:                 Problem: Knowledge Deficit     Description:     Goal: Knowledge of disease process/condition, treatment plan, diagnostic tests, and medications will improve     Description:           Goal: Knowledge of the prescribed therapeutic regimen will improve     Description:                 Problem: Pain Management     Description:     Goal: Pain level will decrease to patient's comfort goal     Description:                 Problem: Safety     Description:     Goal: Will remain free from injury     Description:           Goal: Will remain free from falls     Description:                 Problem: Skin Integrity     Description:     Goal: Risk for impaired skin integrity will decrease     Description:                 Problem: Venous Thromboembolism (VTW)/Deep Vein Thrombosis (DVT) Prevention:     Description:     Goal: Patient will participate in Venous Thrombosis (VTE)/Deep Vein Thrombosis (DVT)Prevention Measures     Description:     Flowsheet:     Taken at 12/07/19 8510    Pharmacologic Prophylaxis Used LMWH: Enoxaparin(Lovenox) by  Gretel Gary R.N.                             Long Term Goals:   At discharge patient will be able to function safely at home and in the community with support.    Section completed by:  Karly Ramos R.N.

## 2019-12-10 NOTE — CARE PLAN
Problem: Problem Solving STGs  Goal: STG-Within one week, patient will  Description  1) Individualized goal:  Complete a medication sorting task with modified independence to achieve 100% accuracy in 2/2 opportunities.    2) Interventions:  SLP Speech Language Treatment, SLP Self Care / ADL Training , SLP Cognitive Skill Development and SLP Group Treatment     12/10/2019 0753 by Almas Fleming MS,CCC-SLP  Outcome: NOT MET  Note:   Continue to target, pt completed 1 medication sorting task with min A required   12/10/2019 0753 by Almas Fleming MS,CCC-SLP  Outcome: NOT MET  Note:   Continue to target, pt completed 1 medication sorting task with min A required      Problem: Memory STGs  Goal: STG-Within one week, patient will  Description  1) Individualized goal:  Write daily therapy notes in her memory notebook with min cues required in 3/3 opportunities.    2) Interventions:  SLP Speech Language Treatment, SLP Self Care / ADL Training , SLP Cognitive Skill Development and SLP Group Treatment     Outcome: NOT MET  Note:   Pt given a memory notebook yesterday, continue to target   Goal: STG-Within one week, patient will  Description  1) Individualized goal:  Recall concussion/mild TBI education given min A with 80% accuracy.    2) Interventions:  SLP Speech Language Treatment, SLP Self Care / ADL Training , SLP Cognitive Skill Development and SLP Group Treatment     Outcome: NOT MET  Note:   TBI education completed yesterday, continue to target

## 2019-12-10 NOTE — THERAPY
Occupational Therapy  Daily Treatment     Patient Name: Olga Lockhart  Age:  69 y.o., Sex:  female  Medical Record #: [id number]  Today's Date: 12/10/2019     Precautions  Precautions: Cervical Collar  , Spinal / Back Precautions , Fall Risk  Comments: C-collar on at all times,         Subjective       Objective       12/10/19 0701   Precautions   Precautions Cervical Collar  ;Spinal / Back Precautions ;Fall Risk   Comments C-collar on at all times,   Vitals   O2 Delivery None (Room Air)   Pain   Intervention Other (Comments);Repositioned;Nurse Notified  (warm blanket)   Pain 0 - 10 Group   Location Back;Neck   Description Aching   Comfort Goal Perform Activity   Therapist Pain Assessment   (Nursing applied lidocain patch at traps)   Non Verbal Descriptors   Non Verbal Scale  Calm   Cognition    Level of Consciousness Alert   Interdisciplinary Plan of Care Collaboration   IDT Collaboration with  Nursing;Certified Nursing Assistant   Patient Position at End of Therapy Seated;Self Releasing Lap Belt Applied   Collaboration Comments Pt escorted to dining room for breakfast.   OT Total Time Spent   OT Individual Total Time Spent (Mins) 60   OT Charge Group   OT Self Care / ADL 4       FIM Eating Score:  6 - Modified Independent  Eating Description:  Increased time    FIM Grooming Score:  5 - Standby Prompting/Supervision or Set-up  Grooming Description:  Increased time, Supervision for safety(Sup/SBA for standing at sinkside for groom/hygiene/oral and hair care)    FIM Bathing Score:  5 - Standby Prompting/Supervision or Set-up  Bathing Description:       FIM Upper Body Dressin - Standby Prompting/Supervision or Set-up  Upper Body Dressing Description:  Increased time, Supervision for safety, Verbal cueing, Application of orthotic or brace(Mod I to manage pull over shirt, required assist to manage cervical collar)    FIM Lower Body Dressing Score:  4 - Minimal Assistance  Lower Body Dressing Description:  Increased  time, Supervision for safety, Verbal cueing, Set-up of equipment, Initial preparation for task, Requires minimal contact(Mod I to thread feet through pants/briefs, SBA in stand via grab bar to manage pants/briefs at waist via grab bar, Pt required assistance for socks.)    FIM Toiletin - Standby Prompting/Supervision or Set-up  Toileting Description:  Grab bar, Increased time, Supervision for safety, Verbal cueing, Set-up of equipment, Initial preparation for task(SBA for LB clothing management in stand via grab bar.  Mod I for toilet hygiene.)    FIM Toilet Transfer Score:  5 - Standby Prompting/Supervision or Set-up  Toilet Transfer Description:  Grab bar, Increased time, Supervision for safety, Initial preparation for task(Sup/SBA for SPT to/from manual wc and commode via grab bar.)    FIM Tub/Shower Transfers Score:  5 - Standby Prompting/Supervision or Set-up  Tub/Shower Transfers Description:  Grab bar, Increased time, Supervision for safety, Initial preparation for task(Sup/SBA for SPT to/frommanual wc and shower bench via grab bar)      Assessment    Pt was alert and cooperative w/ tx.  Limiters include generalized weakness, impaired endurance and cervical precautions.    Plan    ADL/IADLs , related mobility  , strength/endurance building  Incorporating C spine precautions, bilateral FMC  actiivty

## 2019-12-10 NOTE — THERAPY
Physical Therapy   Daily Treatment     Patient Name: Olga Lockhart  Age:  69 y.o., Sex:  female  Medical Record #: [id number]  Today's Date: 12/9/2019     Precautions  Precautions: Cervical Collar  , Spinal / Back Precautions , Fall Risk  Comments: C-collar on at all times,    Subjective    Patient agreeable to PT; received sitting in w/c at bedside after having eaten lunch.     Objective       12/09/19 1231   Vitals   O2 (LPM) 0   O2 Delivery None (Room Air)   Bed Mobility    Sit to Stand Minimal Assist  (gait belt, c/s collar, setup, cueing)   Interdisciplinary Plan of Care Collaboration   IDT Collaboration with  Other (See Comments)   Collaboration Comments pt's friend came during session to talk/observe and deliver cookies to patient   PT Total Time Spent   PT Individual Total Time Spent (Mins) 60   PT Charge Group   PT Gait Training 3   PT Therapeutic Activities 1     Discussed treatment goals, rehab expectations, and pt's strengths/barriers.    FIM Walking Score:  2 - Max Assistance  Walking Description:  (3 reps of 100 feet, Min A to CGA, gait belt and c/s collar, bradykinetic, decreased B step length; extended rest breaks after each rep.)    FIM Stairs Score:  4 - Minimal Assistance  Stairs Description:  (CGA, gait belt, c/s collar, B railings, bradykinetic, setup, 1 set of 12 standard stairs)      Assessment    Patient has improving activity tolerance, although requires about 10 min seated breaks between reps; bradykinetic and impaired balance with ambulation without UE support; good participation though.    Plan    Continue endurance work; progress ambulation distance, reinforce safe stair sequencing; begin family training or review goals for mod I d/c

## 2019-12-10 NOTE — REHAB-PT IDT TEAM NOTE
Physical Therapy   Mobility  Bed mobility:   Patient uses variably increased A with supine to sit  Bed /Chair/Wheelchair Transfer Initial:  1 - Total Assistance  Bed /Chair/Wheelchair Transfer Current:  5 - Standby Prompting/Supervision or Set-up   Bed/Chair/Wheelchair Transfer Description:  (SBA SPT, SBA sit to supine using elevated HOB)  Walk Initial:  2 - Max Assistance  Walk Current:  2 - Max Assistance   Walk Description:  (3 reps of 100 feet, Min A to CGA, gait belt and c/s collar, bradykinetic, decreased B step length; extended rest breaks after each rep.)  Wheelchair Initial:  1 - Total Assistance  Wheelchair Current:  1 - Total Assistance   Wheelchair Description:  (40ft using bilateral UE/LE; fatigues quickly; requires constant cues for w/c skills)  Stairs Initial:  4 - Minimal Assistance  Stairs Current: 4 - Minimal Assistance   Stairs Description: (CGA, gait belt, c/s collar, B railings, bradykinetic, setup, 1 set of 12 standard stairs)  Patient/Family Training/Education:  Poor to fair carryover with education  DME/DC Recommendations:  Patient would benefit from SPV during initial transition home, SPC, Outpatient PT  Strengths:  Able to follow instructions, Alert and oriented, Independent PLOF, Manages pain appropriately, Motivated for self care and independence, Pleasant and cooperative and Willingly participates in therapeutic activities  Barriers:   Decreased endurance, Home accessibility, Limited mobility, Poor balance, Poor family support, Poor insight/denial of deficits and Other: Cervical collar restricts AROM and patient transportation  # of short term goals set= 5  # of short term goals met= 1  Physical Therapy Problems     Problem: Mobility     Dates: Start: 12/06/19       Description:     Goal: STG-Within one week, patient will ambulate community distances     Dates: Start: 12/06/19       Description: 1) Individualized goal: gait belt, SBA, 200 feet, c/s collar, cueing  2) Interventions: PT  Group Therapy, PT Gait Training, PT Therapeutic Exercises, PT TENS Application, PT Neuro Re-Ed/Balance, PT Aquatic Therapy, PT Therapeutic Activity and PT Manual Therapy       Note:     Goal Note filed on 12/10/19 0847 by Todd Preciado, PT    Patient uses Min A to CGA for balance at gait belt with ambulation                  Goal: STG-Within one week, patient will ascend and descend four to six stairs     Dates: Start: 12/06/19       Description: 1) Individualized goal: gait belt, SPV, 1-2 railings, c/s collar  2) Interventions: PT Group Therapy, PT Gait Training, PT Therapeutic Exercises, PT TENS Application, PT Neuro Re-Ed/Balance, PT Aquatic Therapy, PT Therapeutic Activity and PT Manual Therapy         Note:     Goal Note filed on 12/10/19 0848 by Todd Preciado, PT    Patient uses CGA for safety with stairs but has been able to ascend up to 12 stairs in 1 rep.                        Problem: Mobility Transfers     Dates: Start: 12/06/19       Description:     Goal: STG-Within one week, patient will perform bed mobility     Dates: Start: 12/06/19       Description: 1) Individualized goal: Sit to supine with SPV, c/s collar, cueing  2) Interventions: PT Group Therapy, PT Gait Training, PT Therapeutic Exercises, PT TENS Application, PT Neuro Re-Ed/Balance, PT Aquatic Therapy, PT Therapeutic Activity and PT Manual Therapy         Note:     Goal Note filed on 12/10/19 0847 by Todd Preciado, PT    Patient uses SBA currently with sit to supine.                  Goal: STG-Within one week, patient will sit to stand     Dates: Start: 12/06/19       Description: 1) Individualized goal: gait belt, SBA, c/s collar  2) Interventions: PT Group Therapy, PT Gait Training, PT Therapeutic Exercises, PT TENS Application, PT Neuro Re-Ed/Balance, PT Aquatic Therapy, PT Therapeutic Activity and PT Manual Therapy       Note:     Goal Note filed on 12/10/19 0847 by Todd Preciado, PT    Patient uses up to  Min A at this time.                  Goal: STG-Within one week, patient will transfer bed to chair     Dates: Start: 12/10/19       Description: 1) Individualized goal: c/s collar, SPV level, cueing  2) Interventions: PT Group Therapy, PT Gait Training, PT Therapeutic Exercises, PT TENS Application, PT Neuro Re-Ed/Balance, PT Aquatic Therapy, PT Therapeutic Activity and PT Manual Therapy                      Problem: PT-Long Term Goals     Dates: Start: 12/06/19       Description:     Goal: LTG-By discharge, patient will ambulate     Dates: Start: 12/06/19       Description: 1) Individualized goal:  500 feet, c/s collar, SPC prn, Mod I level  2) Interventions:  PT Group Therapy, PT Gait Training, PT Therapeutic Exercises, PT TENS Application, PT Neuro Re-Ed/Balance, PT Aquatic Therapy, PT Therapeutic Activity and PT Manual Therapy             Goal: LTG-By discharge, patient will transfer one surface to another     Dates: Start: 12/06/19       Description: 1) Individualized goal:  c/s collar, Mod I level  2) Interventions:  PT Group Therapy, PT Gait Training, PT Therapeutic Exercises, PT TENS Application, PT Neuro Re-Ed/Balance, PT Aquatic Therapy, PT Therapeutic Activity and PT Manual Therapy               Goal: LTG-By discharge, patient will ambulate up/down 4-6 stairs     Dates: Start: 12/06/19       Description: 1) Individualized goal:  c/s collar, R railing, Mod I level  2) Interventions:  PT Group Therapy, PT Gait Training, PT Therapeutic Exercises, PT TENS Application, PT Neuro Re-Ed/Balance, PT Aquatic Therapy, PT Therapeutic Activity and PT Manual Therapy               Goal: LTG-By discharge, patient will transfer in/out of a car     Dates: Start: 12/06/19       Description: 1) Individualized goal: c/s collar, Mod I level  2) Interventions: PT Group Therapy, PT Gait Training, PT Therapeutic Exercises, PT TENS Application, PT Neuro Re-Ed/Balance, PT Aquatic Therapy, PT Therapeutic Activity and PT Manual  Therapy                           Section completed by:  Todd Preciado, PT

## 2019-12-10 NOTE — REHAB-COLLABORATIVE ONGOING IDT TEAM NOTE
Weekly Interdisciplinary Team Conference Note    Weekly Interdisciplinary Team Conference # 1  Date:  12/10/2019    Clinicians present and reporting at team conference include the following:   MD: GARCIA Lomax MD    RN:  Kenya Casper RN    PT:   Todd Preciado PT, DPT  OT:  Madi Wen MS, OTR/L    ST:  Almas Fleming MS, CCC-SLP  CM:  Cristine Roldan RN [facility]  REC:  Prateek Caceres CTRS  RT:  Not Applicable  RPh:  Sujey Mtz [facility]  All reporting clinicians have a working knowledge of this patient's plan of care.    Targeted DC Date:  12.19.19     Medical    Patient Active Problem List    Diagnosis Date Noted   • Discharge planning issues 12/05/2019     Priority: High   • Fracture of fifth cervical vertebra (HCC) 12/03/2019     Priority: High   • Encounter for geriatric assessment 12/05/2019     Priority: Medium   • Type 2 diabetes mellitus (HCC) 12/03/2019     Priority: Medium   • Hypertension 12/03/2019     Priority: Medium   • Scalp laceration, initial encounter 12/03/2019     Priority: Medium   • Trauma 12/03/2019     Priority: Low   • Hyperlipidemia 12/03/2019     Priority: Low   • Constipation 12/06/2019   • Pancytopenia (HCC) 12/06/2019   • Mild TBI (HCC) 12/05/2019     Results     Procedure Component Value Ref Range Date/Time    ACCU-CHEK GLUCOSE [[id number]]  (Abnormal) Collected:  12/10/19 0710    Order Status:  Completed Lab Status:  Final result Updated:  12/10/19 0733     Glucose - Accu-Ck 170 65 - 99 mg/dL     ACCU-CHEK GLUCOSE [[id number]]  (Abnormal) Collected:  12/09/19 1129    Order Status:  Completed Lab Status:  Final result Updated:  12/10/19 0029     Glucose - Accu-Ck 158 65 - 99 mg/dL     ACCU-CHEK GLUCOSE [[id number]]  (Abnormal) Collected:  12/09/19 2057    Order Status:  Completed Lab Status:  Final result Updated:  12/09/19 2156     Glucose - Accu-Ck 178 65 - 99 mg/dL     ACCU-CHEK GLUCOSE [[id number]]  (Abnormal) Collected:  12/09/19 1709    Order Status:  Completed Lab Status:  Final  result Updated:  12/09/19 1725     Glucose - Accu-Ck 161 65 - 99 mg/dL         Nursing  Diet/Nutrition:  Diabetic  Medication Administration:  Whole with Liquid Wash  % consumed at meals in last 24 hours:    Snack schedule:  HS  Fluid Intake/Output in past 24 hours: In: 120 [P.O.:120]  Out: -   Admit Weight:  Weight: 85.5 kg (188 lb 7.9 oz)  Weight Last 7 Days   [83.6 kg (184 lb 4.9 oz)-85.5 kg (188 lb 7.9 oz)] 83.6 kg (184 lb 4.9 oz) (12/08 1500)    Pain Issues:    Location:  Head (12/09 0740)  Mid (12/09 0740)         Severity:  Moderate   Scheduled pain medications:  None     PRN pain medications used in last 24 hours:  oxycodone immediate release (ROXICODONE)    Non Pharmacologic Interventions:  emotional support, environmental changes and repositioned  Effectiveness of pain management plan:  good=patient states acceptable comfort after interventions    Bowel:    Bowel Assist Initial Score:  4 - Minimal Assistance  Bowel Assist Current Score:  4 - Minimal Assistance  Bowl Accidents in last 7 days:     Last bowel movement: 12/08/19  Stool Description: Medium     Usual bowel pattern:  daily  Scheduled bowel medications:  senna-docusate (PERICOLACE or SENOKOT S)   PRN bowel medications used in last 24 hours:  None  Nursing Interventions:  Increased time, PRN medication, Scheduled medication, Supervision, Verbal cueing, Positioning on commode/toilet, Brief  Effectiveness of bowel program:   good=regular, predictable, controlled emptying of bowel  Bladder:    Bladder Assist Initial Score:  4 - Minimal Assistance  Bladder Assist Current Score:  4 - Minimal Assistance  Bladder Accidents in last 7 days:     PVR range for past 24-48 hours:  [5-20]  ()  Medications affecting bladder:  None    Time void schedule/voiding pattern:  Time void every 3 hours during the day and every 4 hours at night  Interventions:  Increased time, Supervision, Verbal cueing, Brief  Effectiveness of bladder training:  Good=regular, predictable,  emptying of bladder, patient initiates time voiding      Diabetes:  Blood Sugar Frequency:  Before meals and at bedtime    Range of BS for last 48 hours:     Recent Labs     12/08/19  0707 12/08/19  1116 12/08/19  1710 12/08/19  2112 12/09/19  0727 12/09/19  1129 12/09/19  1709 12/09/19  2057   POCGLUCOSE 129* 129* 134* 181* 143* 158* 161* 178*     Scheduled diabetic medications:  metformin (GLUCOPHAGE)  and insulin lispro (HUMALOG) injection   Sliding scale usage in past 24 hours:  Yes  Total Short acting insulin in the past 24 hours:  Humulin 2 units  Total Long acting insulin in the past 24 hours:  None    Wound:         Patient Lines/Drains/Airways Status    Active Current Wounds     Name: Placement date: Placement time: Site: Days:    Wound 12/04/19 Laceration Head  12/04/19   0000   --  6                   Interventions:  FRANTZ, clean, monitor    Sleep/wake cycle:   Average hours slept:  Sleeps greater than 6 hours without waking  Sleep medication usage:  Other trazodone    Patient/Family Training/Education:  Fall Prevention, Medication Management and Safety  Discharge Supply Recommendations:  Glucometer and Strips  Strengths: Alert and oriented, Able to follow instructions, Supportive family, Pleasant and cooperative, Effective communication skills and Good carryover of learning   Barriers:   Generalized weakness and Limited mobility        Nursing Problems     Problem: Bowel/Gastric:     Description:     Goal: Normal bowel function is maintained or improved     Description:           Goal: Will not experience complications related to bowel motility     Description:                 Problem: Communication     Description:     Goal: The ability to communicate needs accurately and effectively will improve     Description:                 Problem: Discharge Barriers/Planning     Description:     Goal: Patient's continuum of care needs will be met     Description:                 Problem: Infection     Description:      Goal: Will remain free from infection     Description:                 Problem: Knowledge Deficit     Description:     Goal: Knowledge of disease process/condition, treatment plan, diagnostic tests, and medications will improve     Description:           Goal: Knowledge of the prescribed therapeutic regimen will improve     Description:                 Problem: Pain Management     Description:     Goal: Pain level will decrease to patient's comfort goal     Description:                 Problem: Safety     Description:     Goal: Will remain free from injury     Description:           Goal: Will remain free from falls     Description:                 Problem: Skin Integrity     Description:     Goal: Risk for impaired skin integrity will decrease     Description:                 Problem: Venous Thromboembolism (VTW)/Deep Vein Thrombosis (DVT) Prevention:     Description:     Goal: Patient will participate in Venous Thrombosis (VTE)/Deep Vein Thrombosis (DVT)Prevention Measures     Description:     Flowsheet:     Taken at 12/07/19 6679    Pharmacologic Prophylaxis Used LMWH: Enoxaparin(Lovenox) by  Gretel Gary R.N.                             Long Term Goals:   At discharge patient will be able to function safely at home and in the community with support.    Section completed by:  Karly Ramos R.N.           Mobility  Bed mobility:   Patient uses variably increased A with supine to sit  Bed /Chair/Wheelchair Transfer Initial:  1 - Total Assistance  Bed /Chair/Wheelchair Transfer Current:  5 - Standby Prompting/Supervision or Set-up   Bed/Chair/Wheelchair Transfer Description:  (SBA SPT, SBA sit to supine using elevated HOB)  Walk Initial:  2 - Max Assistance  Walk Current:  2 - Max Assistance   Walk Description:  (3 reps of 100 feet, Min A to CGA, gait belt and c/s collar, bradykinetic, decreased B step length; extended rest breaks after each rep.)  Wheelchair Initial:  1 - Total Assistance  Wheelchair  Current:  1 - Total Assistance   Wheelchair Description:  (40ft using bilateral UE/LE; fatigues quickly; requires constant cues for w/c skills)  Stairs Initial:  4 - Minimal Assistance  Stairs Current: 4 - Minimal Assistance   Stairs Description: (CGA, gait belt, c/s collar, B railings, bradykinetic, setup, 1 set of 12 standard stairs)  Patient/Family Training/Education:  Poor to fair carryover with education  DME/DC Recommendations:  Patient would benefit from SPV during initial transition home, SPC, Outpatient PT  Strengths:  Able to follow instructions, Alert and oriented, Independent PLOF, Manages pain appropriately, Motivated for self care and independence, Pleasant and cooperative and Willingly participates in therapeutic activities  Barriers:   Decreased endurance, Home accessibility, Limited mobility, Poor balance, Poor family support, Poor insight/denial of deficits and Other: Cervical collar restricts AROM and patient transportation  # of short term goals set= 5  # of short term goals met= 1  Physical Therapy Problems     Problem: Mobility     Dates: Start: 12/06/19       Description:     Goal: STG-Within one week, patient will ambulate community distances     Dates: Start: 12/06/19       Description: 1) Individualized goal: gait belt, SBA, 200 feet, c/s collar, cueing  2) Interventions: PT Group Therapy, PT Gait Training, PT Therapeutic Exercises, PT TENS Application, PT Neuro Re-Ed/Balance, PT Aquatic Therapy, PT Therapeutic Activity and PT Manual Therapy       Note:     Goal Note filed on 12/10/19 0845 by Todd Preciado, PT    Patient uses Min A to CGA for balance at gait belt with ambulation                  Goal: STG-Within one week, patient will ascend and descend four to six stairs     Dates: Start: 12/06/19       Description: 1) Individualized goal: gait belt, SPV, 1-2 railings, c/s collar  2) Interventions: PT Group Therapy, PT Gait Training, PT Therapeutic Exercises, PT TENS Application,  PT Neuro Re-Ed/Balance, PT Aquatic Therapy, PT Therapeutic Activity and PT Manual Therapy         Note:     Goal Note filed on 12/10/19 0848 by Todd Preciado, PT    Patient uses CGA for safety with stairs but has been able to ascend up to 12 stairs in 1 rep.                        Problem: Mobility Transfers     Dates: Start: 12/06/19       Description:     Goal: STG-Within one week, patient will perform bed mobility     Dates: Start: 12/06/19       Description: 1) Individualized goal: Sit to supine with SPV, c/s collar, cueing  2) Interventions: PT Group Therapy, PT Gait Training, PT Therapeutic Exercises, PT TENS Application, PT Neuro Re-Ed/Balance, PT Aquatic Therapy, PT Therapeutic Activity and PT Manual Therapy         Note:     Goal Note filed on 12/10/19 0847 by Todd Preciado, PT    Patient uses SBA currently with sit to supine.                  Goal: STG-Within one week, patient will sit to stand     Dates: Start: 12/06/19       Description: 1) Individualized goal: gait belt, SBA, c/s collar  2) Interventions: PT Group Therapy, PT Gait Training, PT Therapeutic Exercises, PT TENS Application, PT Neuro Re-Ed/Balance, PT Aquatic Therapy, PT Therapeutic Activity and PT Manual Therapy       Note:     Goal Note filed on 12/10/19 0847 by Todd Preciado, PT    Patient uses up to Min A at this time.                  Goal: STG-Within one week, patient will transfer bed to chair     Dates: Start: 12/10/19       Description: 1) Individualized goal: c/s collar, SPV level, cueing  2) Interventions: PT Group Therapy, PT Gait Training, PT Therapeutic Exercises, PT TENS Application, PT Neuro Re-Ed/Balance, PT Aquatic Therapy, PT Therapeutic Activity and PT Manual Therapy                      Problem: PT-Long Term Goals     Dates: Start: 12/06/19       Description:     Goal: LTG-By discharge, patient will ambulate     Dates: Start: 12/06/19       Description: 1) Individualized goal:  500 feet, c/s  julián,  prn, Mod I level  2) Interventions:  PT Group Therapy, PT Gait Training, PT Therapeutic Exercises, PT TENS Application, PT Neuro Re-Ed/Balance, PT Aquatic Therapy, PT Therapeutic Activity and PT Manual Therapy             Goal: LTG-By discharge, patient will transfer one surface to another     Dates: Start: 12/06/19       Description: 1) Individualized goal:  c/s julián, Mod I level  2) Interventions:  PT Group Therapy, PT Gait Training, PT Therapeutic Exercises, PT TENS Application, PT Neuro Re-Ed/Balance, PT Aquatic Therapy, PT Therapeutic Activity and PT Manual Therapy               Goal: LTG-By discharge, patient will ambulate up/down 4-6 stairs     Dates: Start: 12/06/19       Description: 1) Individualized goal:  c/s PRATEEK gallego, Mod I level  2) Interventions:  PT Group Therapy, PT Gait Training, PT Therapeutic Exercises, PT TENS Application, PT Neuro Re-Ed/Balance, PT Aquatic Therapy, PT Therapeutic Activity and PT Manual Therapy               Goal: LTG-By discharge, patient will transfer in/out of a car     Dates: Start: 12/06/19       Description: 1) Individualized goal: c/s julián, Mod I level  2) Interventions: PT Group Therapy, PT Gait Training, PT Therapeutic Exercises, PT TENS Application, PT Neuro Re-Ed/Balance, PT Aquatic Therapy, PT Therapeutic Activity and PT Manual Therapy                           Section completed by:  Todd Preciado, PT    Activities of Daily Living  Eating Initial:  6 - Modified Independent  Eating Current:  6 - Modified Independent   Eating Description:  Increased time  Grooming Initial:  4 - Minimal Assistance  Grooming Current:  5 - Standby Prompting/Supervision or Set-up   Grooming Description:  Increased time, Supervision for safety(Sup/SBA for standing at sinkside for groom/hygiene/oral and hair care)  Bathing Initial:  3 - Moderate Assistance  Bathing Current:  5 - Standby Prompting/Supervision or Set-up   Bathing Description:  Grab bar, Tub  bench, Long handled bath tool, Hand held shower, Increased time, Supervision for safety, Verbal cueing, Set-up of equipment, Initial preparation for task(Total assist to don/doff cervical collar for shower, Sup/SBA for U/LB wash/rinse via shower wand and long handled sponge.  SBA in stand via grab bar for kodi)  Upper Body Dressing Initial:  4 - Minimal Assistance  Upper Body Dressing Current:  5 - Standby Prompting/Supervision or Set-up   Upper Body Dressing Description:  Increased time, Supervision for safety, Verbal cueing, Application of orthotic or brace(Mod I to manage pull over shirt, required assist to manage cervical collar)  Lower Body Dressing Initial:  4 - Minimal Assistance  Lower Body Dressing Current:  4 - Minimal Assistance   Lower Body Dressing Description:  4 - Minimal Assistance  Toileting Initial:  4 - Minimal Assistance  Toileting Current:  5 - Standby Prompting/Supervision or Set-up   Toileting Description:  Grab bar, Increased time, Supervision for safety, Verbal cueing, Set-up of equipment, Initial preparation for task(SBA for LB clothing management in stand via grab bar.  Mod I for toilet hygiene.)  Toilet Transfer Initial:  4 - Minimal Assistance  Toilet Transfer Current:  5 - Standby Prompting/Supervision or Set-up   Toilet Transfer Description:  5 - Standby Prompting/Supervision or Set-up  Tub / Shower Transfer Initial:  4 - Minimal Assistance  Tub / Shower Transfer Current:  5 - Standby Prompting/Supervision or Set-up   Tub / Shower Transfer Description:  Grab bar, Increased time, Supervision for safety, Initial preparation for task(Sup/SBA for SPT to/frommanual wc and shower bench via grab bar)  IADL:  TBD   Family Training/Education:  TBD   DME/DC Recommendations:  TBD     Strengths:  Alert and oriented, Motivated for self care and independence, Pleasant and cooperative and Willingly participates in therapeutic activities  Barriers:  Decreased endurance, Generalized weakness and  Limited mobility     # of short term goals set= 6    # of short term goals met= 2     Occupational Therapy Goals     Problem: Dressing     Dates: Start: 12/06/19       Description:     Goal: STG-Within one week, patient will dress UB     Dates: Start: 12/06/19       Description: 1) Individualized Goal:  Mod I for UB clothing management  2) Interventions:  OT Self Care/ADL, OT Neuro Re-Ed/Balance, OT Therapeutic Activity, OT Evaluation and OT Therapeutic Exercise       Note:     Goal Note filed on 12/10/19 1302 by Madi Wen MS,OTR/L    Pt required Sup/SBA for UB clothing management                  Goal: STG-Within one week, patient will dress LB     Dates: Start: 12/06/19       Description: 1) Individualized Goal:  Sup/SBA for LB clothing management via AE/DME PRN  2) Interventions:  OT Self Care/ADL, OT Neuro Re-Ed/Balance, OT Therapeutic Activity, OT Evaluation and OT Therapeutic Exercise       Note:     Goal Note filed on 12/10/19 1302 by Madi Wen MS,OTR/L    Pt required CGA + VQ's for safety for LB clothing management                        Problem: IADL's     Dates: Start: 12/06/19       Description:     Goal: STG-Within one week, patient will prepare a meal     Dates: Start: 12/06/19       Description: 1) Individualized Goal:  Mod I for simple meal prep via AE/DME pRN  2) Interventions:  OT Self Care/ADL, OT Neuro Re-Ed/Balance, OT Therapeutic Activity, OT Evaluation and OT Therapeutic Exercise       Note:     Goal Note filed on 12/10/19 1302 by Madi Wen MS,OTR/L    To be conducted                  Goal: STG-Within one week, patient will     Dates: Start: 12/06/19       Description: 1) Individualized Goal:  Mod I to manage cervical collar + change pads out.  2) Interventions:  OT Self Care/ADL, OT Neuro Re-Ed/Balance, OT Therapeutic Activity, OT Evaluation and OT Therapeutic Exercise       Note:     Goal Note filed on 12/10/19 1302 by Madi Wen MS,OTR/L      Pt at mod assist for cervical collar  management                          Problem: OT Long Term Goals     Dates: Start: 12/06/19       Description:     Goal: LTG-By discharge, patient will complete basic self care tasks     Dates: Start: 12/06/19       Description: 1) Individualized Goal:  Mod I for BADL's via AE/DME PRN  2) Interventions:  OT Self Care/ADL, OT Neuro Re-Ed/Balance, OT Therapeutic Activity, OT Evaluation and OT Therapeutic Exercise             Goal: LTG-By discharge, patient will perform bathroom transfers     Dates: Start: 12/06/19       Description: 1) Individualized Goal:  Mod I for bathroom transfer via DME pRN  2) Interventions:  OT Self Care/ADL, OT Neuro Re-Ed/Balance, OT Therapeutic Activity, OT Evaluation and OT Therapeutic Exercise                         Section completed by:  Madi Wen MS,OTR/L    Cognitive Linquistic Functions  Comprehension Initial:  7 - Independent  Comprehension Current:  7 - Independent   Comprehension Description:     Expression Initial:  7 - Independent  Expression Current:  7 - Independent   Expression Description:     Social Interaction Initial:  6 - Modified Independent  Social Interaction Current:  6 - Modified Independent   Social Interaction Description:  Increased time, Medication  Problem Solving Initial:  6 - Modified Independent  Problem Solving Current:  5 - Standby Prompting/Supervision or Set-up   Problem Solving Description:  Verbal cueing, Increased time  Memory Initial:  3 - Moderate Assistance  Memory Current:  4 - Minimal Assistance   Memory Description:  Verbal cueing, Bed/chair alarm, Therapy schedule  Executive Functioning / Organization Initial:   NA  Executive Functioning / Organization Current:   Min A   Executive Functioning Desciption:  Verbal cues, extra time   Swallowing  Swallowing Status:  Not following for dysphagia management   Orders Placed This Encounter   Procedures   • Diet Order Diabetic     Standing Status:   Standing     Number of Occurrences:   1     Order  Specific Question:   Diet:     Answer:   Diabetic [3]     Behavior Modification Plan  Allow for rest time, Redirect to task/topic and Analyze tasks (break down into smaller steps)  Assistive Technology  Memory aides: and Low tech: Calendar, planner, schedule, alarms/timers, pill organizer, post-it notes, lists  Family Training/Education:  To be completed   DC Recommendations:   TBD, pt may require assistance with medication management   Strengths:  Able to follow instructions, Alert and oriented, Effective communication skills, Independent PLOF, Motivated for self care and independence, Pleasant and cooperative, Supportive family and Willingly participates in therapeutic activities  Barriers:  Pain poorly managed, Poor carryover of learning and Other: fatigue, attention   # of short term goals set=3  # of short term goals met=0 (pt recently evaluated)   Speech Therapy Problems     Problem: Memory STGs     Dates: Start: 12/06/19       Description:     Goal: STG-Within one week, patient will     Dates: Start: 12/06/19       Description: 1) Individualized goal:  Write daily therapy notes in her memory notebook with min cues required in 3/3 opportunities.    2) Interventions:  SLP Speech Language Treatment, SLP Self Care / ADL Training , SLP Cognitive Skill Development and SLP Group Treatment       Note:     Goal Note filed on 12/10/19 0753 by Almas Fleming MS,CCC-SLP    Pt given a memory notebook yesterday, continue to target                   Goal: STG-Within one week, patient will     Dates: Start: 12/06/19       Description: 1) Individualized goal:  Recall concussion/mild TBI education given min A with 80% accuracy.    2) Interventions:  SLP Speech Language Treatment, SLP Self Care / ADL Training , SLP Cognitive Skill Development and SLP Group Treatment       Note:     Goal Note filed on 12/10/19 0753 by Almas Fleming MS,CCC-SLP    TBI education completed yesterday, continue to target                          Problem: Problem Solving STGs     Dates: Start: 12/06/19       Description:     Goal: STG-Within one week, patient will     Dates: Start: 12/06/19       Description: 1) Individualized goal:  Complete a medication sorting task with modified independence to achieve 100% accuracy in 2/2 opportunities.    2) Interventions:  SLP Speech Language Treatment, SLP Self Care / ADL Training , SLP Cognitive Skill Development and SLP Group Treatment       Note:     Goal Note filed on 12/10/19 0753 by Almas Fleming MS,CCC-SLP    Continue to target, pt completed 1 medication sorting task with min A required                         Problem: Speech/Swallowing LTGs     Dates: Start: 12/06/19       Description:     Goal: LTG-By discharge, patient will solve complex problem     Dates: Start: 12/06/19       Description: 1) Individualized goal:  With modified independence for a safe discharge home   2) Interventions:  SLP Speech Language Treatment, SLP Self Care / ADL Training , SLP Cognitive Skill Development and SLP Group Treatment                          Section completed by:  Almas Fleming MS,CCC-SLP    Recreation/Community     Leisure Competence Measure  Leisure Awareness: Independent  Leisure Attitude: Independent  Leisure Skills: Minimal Assist  Cultural / Social Behaviors: Independent  Interpersonal Skills: Independent  Community Integration Skills: Modified Independent  Social Contact: Independent  Community Participation: Independent    Strengths:  Able to follow instructions, Alert and oriented, Good insight into deficits/needs, Motivated for self care and independence, Pleasant and cooperative and Willingly participates in therapeutic activities  Barriers:  Decreased endurance, Generalized weakness, Pain poorly managed and Poor activity tolerance  # of short term goals set=2  # of short term goals met=0    Pt requires reminders to position her neck in a proper position. Pt encouraged to ask for the table to be raised so that  she would be able to keep things at a higher level and not look down. Looking down limits her endurance with neck pain. Motivated to increase her fine motor skills and is open to new activities reinforcing this.     Recreation Therapy Problems     Problem: Recreation Therapy     Dates: Start: 12/10/19       Description:     Goal: STG-Within one week, patient will actively engage in planning of community re-integration outing     Dates: Start: 12/10/19       Description:           Goal: STG-Within one week, patient will     Dates: Start: 12/10/19       Description: Be able to  and hold items using fine motor skills for 15 minutes of the 30 minute session.            Goal: LTG-By discharge, patient will participate in a community re-integration outing     Dates: Start: 12/10/19       Description:           Goal: LTG-By discharge, patient will     Dates: Start: 12/10/19       Description: Be able to  and hold items using fine motor skills for 25 minutes of the 30 minute session.                        Section completed by:  ALCON VincentS       REHAB-Pharmacy IDT Team Note by Milo Marti RPH at 12/10/2019  8:08 AM  Version 1 of 1    Author:  Milo Marti RPH Service:  -- Author Type:  Pharmacist    Filed:  12/10/2019  8:10 AM Date of Service:  12/10/2019  8:08 AM Status:  Signed    :  Milo Marti RPH (Pharmacist)         Pharmacy   Pharmacy  Antibiotics/Antifungals/Antivirals:  Medication:      Active Orders (From admission, onward)    None        Route:        NA  Stop Date:  NA  Reason:      NA  Antihypertensives/Cardiac:  Medication:    Orders (72h ago, onward)     Start     Ordered    12/07/19 0900  lisinopril (PRINIVIL) tablet 10 mg  DAILY      12/06/19 1703    12/06/19 0900  atorvastatin (LIPITOR) tablet 20 mg  DAILY      12/05/19 1531    12/05/19 1531  hydrALAZINE (APRESOLINE) tablet 25 mg  EVERY 8 HOURS PRN      12/05/19 1531              Patient Vitals for the past  24 hrs:   BP Pulse   12/10/19 0700 118/76 85   12/09/19 1820 108/73 99   12/09/19 1437 109/61 (!) 105     Anticoagulation:  Medication: Lovenox                               Other key medications: A review of the medication list reveals no issues at this time. Patient is currently on antihypertensive(s). Recommend home blood pressure monitoring/recording if antihypertensive(s) regimen(s) continue. Patient is currently on diabetic medication(s) and/or Insulin(s). Recommend home blood glucose monitoring/recording if these regimen(s) continue.    Section completed by: Milo Marti [facility][[email or web address].1]     Attribution Key     AW.1 - Milo Marti [facility] on 12/10/2019  8:08 AM                  DC Planning  DC destination/dispostion: Home w/ in-laws to [place]. Need to verify address & phone info for post acute.  Referrals: TBD.  DC Needs: DME for patient safety & mobility. HH vs OP therapy. MD f/u appts.  Barriers to discharge: Functional mobility deficits. Cognitive deficits[place] Pain mgmt.    Strengths: Motivation. Supportive family. PLOF: independent, active lifestyle.     Section completed by:  Cristine Roldan R.N.    Summary: continent, SPV basic ADLs, min assist LBD, decreased safety awareness, insight into deficits & balance, decreased self limiting behaviors, cognitive: decreased memory , attention & insight, pain limits participation. DCP: home w/ in-laws to [place].    Physician Summary  GARCIA Lomax MD  participated and led team conference discussion.

## 2019-12-10 NOTE — REHAB-SLP IDT TEAM NOTE
Speech Therapy   Cognitive Linquistic Functions  Comprehension Initial:  7 - Independent  Comprehension Current:  7 - Independent   Comprehension Description:     Expression Initial:  7 - Independent  Expression Current:  7 - Independent   Expression Description:     Social Interaction Initial:  6 - Modified Independent  Social Interaction Current:  6 - Modified Independent   Social Interaction Description:  Increased time, Medication  Problem Solving Initial:  6 - Modified Independent  Problem Solving Current:  5 - Standby Prompting/Supervision or Set-up   Problem Solving Description:  Verbal cueing, Increased time  Memory Initial:  3 - Moderate Assistance  Memory Current:  4 - Minimal Assistance   Memory Description:  Verbal cueing, Bed/chair alarm, Therapy schedule  Executive Functioning / Organization Initial:   NA  Executive Functioning / Organization Current:   Min A   Executive Functioning Desciption:  Verbal cues, extra time   Swallowing  Swallowing Status:  Not following for dysphagia management   Orders Placed This Encounter   Procedures   • Diet Order Diabetic     Standing Status:   Standing     Number of Occurrences:   1     Order Specific Question:   Diet:     Answer:   Diabetic [3]     Behavior Modification Plan  Allow for rest time, Redirect to task/topic and Analyze tasks (break down into smaller steps)  Assistive Technology  Memory aides: and Low tech: Calendar, planner, schedule, alarms/timers, pill organizer, post-it notes, lists  Family Training/Education:  To be completed   DC Recommendations:   TBD, pt may require assistance with medication management   Strengths:  Able to follow instructions, Alert and oriented, Effective communication skills, Independent PLOF, Motivated for self care and independence, Pleasant and cooperative, Supportive family and Willingly participates in therapeutic activities  Barriers:  Pain poorly managed, Poor carryover of learning and Other: fatigue, attention   # of  short term goals set=3  # of short term goals met=0 (pt recently evaluated)   Speech Therapy Problems     Problem: Memory STGs     Dates: Start: 12/06/19       Description:     Goal: STG-Within one week, patient will     Dates: Start: 12/06/19       Description: 1) Individualized goal:  Write daily therapy notes in her memory notebook with min cues required in 3/3 opportunities.    2) Interventions:  SLP Speech Language Treatment, SLP Self Care / ADL Training , SLP Cognitive Skill Development and SLP Group Treatment       Note:     Goal Note filed on 12/10/19 0753 by Almas Fleming MS,CCC-SLP    Pt given a memory notebook yesterday, continue to target                   Goal: STG-Within one week, patient will     Dates: Start: 12/06/19       Description: 1) Individualized goal:  Recall concussion/mild TBI education given min A with 80% accuracy.    2) Interventions:  SLP Speech Language Treatment, SLP Self Care / ADL Training , SLP Cognitive Skill Development and SLP Group Treatment       Note:     Goal Note filed on 12/10/19 0753 by Almas Fleming MS,CCC-SLP    TBI education completed yesterday, continue to target                         Problem: Problem Solving STGs     Dates: Start: 12/06/19       Description:     Goal: STG-Within one week, patient will     Dates: Start: 12/06/19       Description: 1) Individualized goal:  Complete a medication sorting task with modified independence to achieve 100% accuracy in 2/2 opportunities.    2) Interventions:  SLP Speech Language Treatment, SLP Self Care / ADL Training , SLP Cognitive Skill Development and SLP Group Treatment       Note:     Goal Note filed on 12/10/19 0753 by Almas Fleming MS,CCC-SLP    Continue to target, pt completed 1 medication sorting task with min A required                         Problem: Speech/Swallowing LTGs     Dates: Start: 12/06/19       Description:     Goal: LTG-By discharge, patient will solve complex problem     Dates: Start: 12/06/19        Description: 1) Individualized goal:  With modified independence for a safe discharge home   2) Interventions:  SLP Speech Language Treatment, SLP Self Care / ADL Training , SLP Cognitive Skill Development and SLP Group Treatment                          Section completed by:  Almas Fleming MS,CCC-SLP

## 2019-12-10 NOTE — THERAPY
"Recreational Therapy  Daily Treatment     Patient Name: Olga Lockhart  AGE:  69 y.o., SEX:  female  Medical Record #: [id number]  Today's Date: 12/10/2019       Subjective    \" I didn't really like the maze.\" \"I don't want to use my brain.\"     Objective       12/10/19 0931   Functional Ability Status - Cognitive   Attention Span Remains on Task   Comprehension Follows Three Step Commands   Judgment Able to Make Independent Decisions   Functional Ability Status - Emotional    Affect Appropriate   Mood Appropriate   Behavior Appropriate   Skilled Intervention    Skilled Intervention Cognitive Leisure;Relaxation / Coping Skills;Fine Motor Leisure   Skilled Intervention Comments Completed maze from prior session and began a maze.    Interdisciplinary Plan of Care Collaboration   IDT Collaboration with  Speech Therapist   Patient Position at End of Therapy Seated  (With SLP)   Collaboration Comments Handoff to SLP in Speech office   Treatment Time   Total Time Spent (mins) 30   Procedural Tracking   Procedural Tracking Cognitive Skills Training;Fine Motor Functional Leisure Skills       Assessment    Pt avoids mid to high level cognitive tasks. She completed a fine motor task in which she needed to create a maze using small foam pieces. She then had to trade mazes with this writer and solve the maze. She extra time to solve the maze from the time given. She then was given a 35 piece puzzle to solve. She was able to solve 25% of the puzzle in 15 minutes.     Plan    Cognitive leisure   "

## 2019-12-11 LAB
ANION GAP SERPL CALC-SCNC: 10 MMOL/L (ref 0–11.9)
ANISOCYTOSIS BLD QL SMEAR: ABNORMAL
BASOPHILS # BLD AUTO: 0.9 % (ref 0–1.8)
BASOPHILS # BLD: 0.03 K/UL (ref 0–0.12)
BUN SERPL-MCNC: 15 MG/DL (ref 8–22)
CALCIUM SERPL-MCNC: 9.1 MG/DL (ref 8.5–10.5)
CHLORIDE SERPL-SCNC: 109 MMOL/L (ref 96–112)
CO2 SERPL-SCNC: 20 MMOL/L (ref 20–33)
CREAT SERPL-MCNC: 0.82 MG/DL (ref 0.5–1.4)
EOSINOPHIL # BLD AUTO: 0.03 K/UL (ref 0–0.51)
EOSINOPHIL NFR BLD: 0.9 % (ref 0–6.9)
ERYTHROCYTE [DISTWIDTH] IN BLOOD BY AUTOMATED COUNT: 57.2 FL (ref 35.9–50)
GLUCOSE BLD-MCNC: 110 MG/DL (ref 65–99)
GLUCOSE BLD-MCNC: 134 MG/DL (ref 65–99)
GLUCOSE BLD-MCNC: 149 MG/DL (ref 65–99)
GLUCOSE BLD-MCNC: 173 MG/DL (ref 65–99)
GLUCOSE BLD-MCNC: 175 MG/DL (ref 65–99)
GLUCOSE SERPL-MCNC: 144 MG/DL (ref 65–99)
HCT VFR BLD AUTO: 28 % (ref 37–47)
HGB BLD-MCNC: 8.8 G/DL (ref 12–16)
LYMPHOCYTES # BLD AUTO: 1.51 K/UL (ref 1–4.8)
LYMPHOCYTES NFR BLD: 54.1 % (ref 22–41)
MANUAL DIFF BLD: NORMAL
MCH RBC QN AUTO: 29.7 PG (ref 27–33)
MCHC RBC AUTO-ENTMCNC: 31.4 G/DL (ref 33.6–35)
MCV RBC AUTO: 94.6 FL (ref 81.4–97.8)
METAMYELOCYTES NFR BLD MANUAL: 0.9 %
MICROCYTES BLD QL SMEAR: ABNORMAL
MONOCYTES # BLD AUTO: 0.18 K/UL (ref 0–0.85)
MONOCYTES NFR BLD AUTO: 6.3 % (ref 0–13.4)
MORPHOLOGY BLD-IMP: NORMAL
NEUTROPHILS # BLD AUTO: 1.03 K/UL (ref 2–7.15)
NEUTROPHILS NFR BLD: 35.1 % (ref 44–72)
NEUTS BAND NFR BLD MANUAL: 1.8 % (ref 0–10)
NRBC # BLD AUTO: 0 K/UL
NRBC BLD-RTO: 0 /100 WBC
OVALOCYTES BLD QL SMEAR: NORMAL
PLATELET # BLD AUTO: 67 K/UL (ref 164–446)
PLATELET BLD QL SMEAR: NORMAL
PMV BLD AUTO: 10.5 FL (ref 9–12.9)
POIKILOCYTOSIS BLD QL SMEAR: NORMAL
POLYCHROMASIA BLD QL SMEAR: NORMAL
POTASSIUM SERPL-SCNC: 4.2 MMOL/L (ref 3.6–5.5)
RBC # BLD AUTO: 2.96 M/UL (ref 4.2–5.4)
RBC BLD AUTO: PRESENT
SODIUM SERPL-SCNC: 139 MMOL/L (ref 135–145)
WBC # BLD AUTO: 2.8 K/UL (ref 4.8–10.8)

## 2019-12-11 PROCEDURE — 97110 THERAPEUTIC EXERCISES: CPT

## 2019-12-11 PROCEDURE — 99232 SBSQ HOSP IP/OBS MODERATE 35: CPT | Performed by: HOSPITALIST

## 2019-12-11 PROCEDURE — 99232 SBSQ HOSP IP/OBS MODERATE 35: CPT | Performed by: PHYSICAL MEDICINE & REHABILITATION

## 2019-12-11 PROCEDURE — 85027 COMPLETE CBC AUTOMATED: CPT

## 2019-12-11 PROCEDURE — 36415 COLL VENOUS BLD VENIPUNCTURE: CPT

## 2019-12-11 PROCEDURE — 85007 BL SMEAR W/DIFF WBC COUNT: CPT

## 2019-12-11 PROCEDURE — G0515 COGNITIVE SKILLS DEVELOPMENT: HCPCS

## 2019-12-11 PROCEDURE — 770010 HCHG ROOM/CARE - REHAB SEMI PRIVAT*

## 2019-12-11 PROCEDURE — 80048 BASIC METABOLIC PNL TOTAL CA: CPT

## 2019-12-11 PROCEDURE — 82962 GLUCOSE BLOOD TEST: CPT | Mod: 91

## 2019-12-11 PROCEDURE — 97112 NEUROMUSCULAR REEDUCATION: CPT

## 2019-12-11 PROCEDURE — 700102 HCHG RX REV CODE 250 W/ 637 OVERRIDE(OP): Performed by: PHYSICAL MEDICINE & REHABILITATION

## 2019-12-11 PROCEDURE — 700111 HCHG RX REV CODE 636 W/ 250 OVERRIDE (IP): Performed by: HOSPITALIST

## 2019-12-11 PROCEDURE — 97530 THERAPEUTIC ACTIVITIES: CPT

## 2019-12-11 PROCEDURE — 700111 HCHG RX REV CODE 636 W/ 250 OVERRIDE (IP): Performed by: PHYSICAL MEDICINE & REHABILITATION

## 2019-12-11 PROCEDURE — A9270 NON-COVERED ITEM OR SERVICE: HCPCS | Performed by: HOSPITALIST

## 2019-12-11 PROCEDURE — 700102 HCHG RX REV CODE 250 W/ 637 OVERRIDE(OP): Performed by: HOSPITALIST

## 2019-12-11 PROCEDURE — 700101 HCHG RX REV CODE 250: Performed by: PHYSICAL MEDICINE & REHABILITATION

## 2019-12-11 PROCEDURE — A9270 NON-COVERED ITEM OR SERVICE: HCPCS | Performed by: PHYSICAL MEDICINE & REHABILITATION

## 2019-12-11 RX ORDER — POLYETHYLENE GLYCOL 3350 17 G/17G
1 POWDER, FOR SOLUTION ORAL
Status: DISCONTINUED | OUTPATIENT
Start: 2019-12-11 | End: 2019-12-19 | Stop reason: HOSPADM

## 2019-12-11 RX ORDER — AMOXICILLIN 250 MG
2 CAPSULE ORAL 2 TIMES DAILY PRN
Status: DISCONTINUED | OUTPATIENT
Start: 2019-12-11 | End: 2019-12-19 | Stop reason: HOSPADM

## 2019-12-11 RX ORDER — BISACODYL 10 MG
10 SUPPOSITORY, RECTAL RECTAL
Status: DISCONTINUED | OUTPATIENT
Start: 2019-12-11 | End: 2019-12-19 | Stop reason: HOSPADM

## 2019-12-11 RX ADMIN — LISINOPRIL 10 MG: 5 TABLET ORAL at 08:28

## 2019-12-11 RX ADMIN — OMEPRAZOLE 20 MG: 20 CAPSULE, DELAYED RELEASE ORAL at 08:28

## 2019-12-11 RX ADMIN — FLUOXETINE HYDROCHLORIDE 10 MG: 20 TABLET, FILM COATED ORAL at 08:28

## 2019-12-11 RX ADMIN — ENOXAPARIN SODIUM 40 MG: 40 INJECTION, SOLUTION INTRAVENOUS; SUBCUTANEOUS at 08:27

## 2019-12-11 RX ADMIN — OXYCODONE HYDROCHLORIDE 5 MG: 5 TABLET ORAL at 14:44

## 2019-12-11 RX ADMIN — ATORVASTATIN CALCIUM 20 MG: 10 TABLET, FILM COATED ORAL at 08:28

## 2019-12-11 RX ADMIN — CYCLOBENZAPRINE 10 MG: 10 TABLET, FILM COATED ORAL at 20:13

## 2019-12-11 RX ADMIN — METFORMIN HYDROCHLORIDE 500 MG: 500 TABLET ORAL at 08:28

## 2019-12-11 RX ADMIN — METFORMIN HYDROCHLORIDE 500 MG: 500 TABLET ORAL at 17:45

## 2019-12-11 RX ADMIN — LIDOCAINE 2 PATCH: 50 PATCH TOPICAL at 08:27

## 2019-12-11 RX ADMIN — TBO-FILGRASTIM 480 MCG: 480 INJECTION, SOLUTION SUBCUTANEOUS at 15:00

## 2019-12-11 RX ADMIN — CYCLOBENZAPRINE 10 MG: 10 TABLET, FILM COATED ORAL at 08:28

## 2019-12-11 RX ADMIN — CYCLOBENZAPRINE 10 MG: 10 TABLET, FILM COATED ORAL at 15:06

## 2019-12-11 ASSESSMENT — ENCOUNTER SYMPTOMS
VOMITING: 0
SHORTNESS OF BREATH: 0
EYES NEGATIVE: 1
FEVER: 0
NAUSEA: 0
COUGH: 0
BRUISES/BLEEDS EASILY: 0
PALPITATIONS: 0
CHILLS: 0
ABDOMINAL PAIN: 0

## 2019-12-11 NOTE — THERAPY
"Speech Language Pathology  Daily Treatment     Patient Name: Olga Lockhart  Age:  69 y.o., Sex:  female  Medical Record #: [id number]  Today's Date: 12/11/2019     Subjective    Pt was pleasant and cooperative during this ST session      Objective       12/11/19 1331   SLP Total Time Spent   SLP Individual Total Time Spent (Mins) 30   Charge Group   SLP Cognitive Skill Development 2       Assessment    Pt completed a medication sorting task independently with 85% accuracy.  Pt's only error was sorting a medication scheduled for breakfast and dinner at breakfast and lunch (meds too close together).  Pt was able to identify this error with min cues.  Pt stated throughout this task that she frequently became confused because the pill sorter she was using is different than the one she has at home.  Pt stated \"I just need to concentrate more so I put them in the right boxes\".      Plan    Complete a financial management task     "

## 2019-12-11 NOTE — PROGRESS NOTES
Patient participated in Diabetic Education class. Reviewed concepts of blood sugar monitoring, hypoglycemia, hyperglycemia, diabetes medications, and wound monitoring. Referred patient to outpatient  Diabetes Program at [facility].
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
## 2019-12-11 NOTE — THERAPY
"Occupational Therapy  Daily Treatment     Patient Name: Olga Lockhart  Age:  69 y.o., Sex:  female  Medical Record #: [id number]  Today's Date: 12/11/2019     Precautions  Precautions: Cervical Collar  , Spinal / Back Precautions , Fall Risk  Comments: C-collar on at all times         Subjective       Objective     12/11/19 1031   Precautions   Precautions Cervical Collar  ;Spinal / Back Precautions ;Fall Risk   Comments C-collar on at all times   Vitals   O2 Delivery None (Room Air)   Non Verbal Descriptors   Non Verbal Scale  Calm   Cognition    Level of Consciousness Alert   Sitting Upper Body Exercises   Other Exercise Rickshaw, Forward 1x10@25#, 2x10@30#'s; Reverse 3x10@25#'s   Standing Upper Body Exercises   Chest Press 3 sets of 10  (Standing on 3\" foeam pad w/ CGA via gait belt, 4# wand)   Bicep Curls 3 sets of 10  (Standing on 3\" foam pad w/ CGA via gait belt, 10# wand)   Other Exercises Standing at FluidoBike, level 2, 15 mins, seated rest break x 2, 2.377km   Comments LOB x 1 w/ standing on 3' foam pad for chest press - therapist assisted correction via gait belt.   Interdisciplinary Plan of Care Collaboration   Patient Position at End of Therapy Seated;Self Releasing Lap Belt Applied   Collaboration Comments Pt escorted to dining room for lunch.   OT Total Time Spent   OT Individual Total Time Spent (Mins) 60   OT Charge Group   OT Therapeutic Exercise  4       Assessment    Pt was alert and cooperative w/ tx.  Limiters include impaired endurance, standing balance and generalized weakness.  Noted neuropathy spinal/back precautions/cervical collar.  LOB x 1 w/ standing there ex as reflected above.    Plan    ADL/IADLs , related mobility  , strength/endurance building  Incorporating C spine precautions, bilateral FMC  actiivty    "
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
## 2019-12-11 NOTE — THERAPY
Speech Language Pathology  Daily Treatment     Patient Name: Olga Lockhart  Age:  69 y.o., Sex:  female  Medical Record #: [id number]  Today's Date: 12/11/2019     Subjective    Pt c/o 8/10 right neck pain, pain meds administered during session. Pt expressed concerns with caring for dogs while in the hospital.      Objective     12/11/19 1434   Cognition   Functional Math / Financial Management Moderate (3)   Interdisciplinary Plan of Care Collaboration   IDT Collaboration with  Nursing   Patient Position at End of Therapy Seated   Collaboration Comments nursing administered pain meds during session   SLP Total Time Spent   SLP Individual Total Time Spent (Mins) 30   Charge Group   SLP Cognitive Skill Development 2       Assessment    Fxl math task (checkbook register) - pt completed 4 entries with MOD cues for placekeeping, frequently entering in wrong calculations, unable to self correct during session. Pt required verbal cues for organization and slowing rate to increase accuracy. Pt performance may have been limited by pain this session.     Plan    Continue fxl math task.

## 2019-12-11 NOTE — PROGRESS NOTES
Patient care assumed. Report received from [facility] ASHISH Lee. Patient is alert and calm, resting in bed. Call light and bedside table within reach. Will continue to monitor.

## 2019-12-11 NOTE — DISCHARGE PLANNING
Met w/ patient to review IDT conference team recommendations & targeted DC date 12.19.19. Patient plans to discharge home w/ in-laws to [place]. Contact information provided to therapies to set up family training prior to discharge date. Discussed HH referral for RN, PT/OT/SLP. Need physical address patient will be at post acute for referral. DIL will provided to me. Questions answered. Emotional support provided.
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
## 2019-12-11 NOTE — CARE PLAN
Problem: Safety  Goal: Will remain free from falls  Intervention: Implement fall precautions  Note:   Use of call light encouraged to make needs known.Fall precautions and frequent rounding in place for safety.Call light within reach.C collar on at all times.Will continue to monitor and assess needs and safety.     Problem: Bowel/Gastric:  Goal: Normal bowel function is maintained or improved  Intervention: Educate patient and significant other/support system about signs and symptoms of constipation and interventions to implement  Note:   Pt is continent of bowel per report.Scheduled senna given at hs.LBM 12/10.Will continue to monitor and assess for s/sx of constipation.     Problem: Pain Management  Goal: Pain level will decrease to patient's comfort goal  Intervention: Follow pain managment plan developed in collaboration with patient and Interdisciplinary Team  Note:   Medicated per request for pain.Repositioned with pillows for comfort.Will continue to monitor and assess pain level and medicate as needed.

## 2019-12-11 NOTE — THERAPY
"Recreational Therapy  Daily Treatment     Patient Name: Olga Lockhart  AGE:  69 y.o., SEX:  female  Medical Record #: [id number]  Today's Date: 12/11/2019       Subjective    \"I'm having a lot of gas.\"     Objective       12/11/19 0901   Functional Ability Status - Cognitive   Attention Span Remains on Task   Comprehension Follows Three Step Commands   Judgment Able to Make Independent Decisions   Functional Ability Status - Emotional    Affect Appropriate   Mood Appropriate   Behavior Appropriate   Skilled Intervention    Skilled Intervention Fine Motor Leisure;Cognitive Leisure   Skilled Intervention Comments Completing a puzzle from a prior session.    Interdisciplinary Plan of Care Collaboration   Patient Position at End of Therapy Edge of Bed;Call Light within Reach;Tray Table within Reach;Other (Comments)  (working on a puzzle. )   Treatment Time   Total Time Spent (mins) 15   Total Time Missed 15   Reasons for Time Missed Medical-Patient Has Bowel Issues   Procedural Tracking   Procedural Tracking Cognitive Skills Training;Fine Motor Functional Leisure Skills       Assessment    Pt had to use the restroom x2 during the 30 minute session. She was able to complete the 35 piece puzzle with 50% assistance from this writer due to time constraints.     Plan    Standing balance and endurance during leisure activities.   "

## 2019-12-12 LAB
GLUCOSE BLD-MCNC: 127 MG/DL (ref 65–99)
GLUCOSE BLD-MCNC: 147 MG/DL (ref 65–99)
GLUCOSE BLD-MCNC: 151 MG/DL (ref 65–99)

## 2019-12-12 PROCEDURE — 700101 HCHG RX REV CODE 250: Performed by: PHYSICAL MEDICINE & REHABILITATION

## 2019-12-12 PROCEDURE — 97535 SELF CARE MNGMENT TRAINING: CPT

## 2019-12-12 PROCEDURE — 82962 GLUCOSE BLOOD TEST: CPT | Mod: 91

## 2019-12-12 PROCEDURE — 97116 GAIT TRAINING THERAPY: CPT

## 2019-12-12 PROCEDURE — 700111 HCHG RX REV CODE 636 W/ 250 OVERRIDE (IP): Performed by: PHYSICAL MEDICINE & REHABILITATION

## 2019-12-12 PROCEDURE — 97110 THERAPEUTIC EXERCISES: CPT

## 2019-12-12 PROCEDURE — 770010 HCHG ROOM/CARE - REHAB SEMI PRIVAT*

## 2019-12-12 PROCEDURE — 99232 SBSQ HOSP IP/OBS MODERATE 35: CPT | Performed by: PHYSICAL MEDICINE & REHABILITATION

## 2019-12-12 PROCEDURE — 97530 THERAPEUTIC ACTIVITIES: CPT

## 2019-12-12 PROCEDURE — A9270 NON-COVERED ITEM OR SERVICE: HCPCS | Performed by: HOSPITALIST

## 2019-12-12 PROCEDURE — 700102 HCHG RX REV CODE 250 W/ 637 OVERRIDE(OP): Performed by: HOSPITALIST

## 2019-12-12 PROCEDURE — G0515 COGNITIVE SKILLS DEVELOPMENT: HCPCS

## 2019-12-12 PROCEDURE — 700102 HCHG RX REV CODE 250 W/ 637 OVERRIDE(OP): Performed by: PHYSICAL MEDICINE & REHABILITATION

## 2019-12-12 PROCEDURE — A9270 NON-COVERED ITEM OR SERVICE: HCPCS | Performed by: PHYSICAL MEDICINE & REHABILITATION

## 2019-12-12 PROCEDURE — 99232 SBSQ HOSP IP/OBS MODERATE 35: CPT | Performed by: HOSPITALIST

## 2019-12-12 RX ORDER — TIZANIDINE 4 MG/1
4 TABLET ORAL 3 TIMES DAILY
Status: DISCONTINUED | OUTPATIENT
Start: 2019-12-12 | End: 2019-12-16

## 2019-12-12 RX ADMIN — METFORMIN HYDROCHLORIDE 500 MG: 500 TABLET ORAL at 08:15

## 2019-12-12 RX ADMIN — TIZANIDINE 4 MG: 4 TABLET ORAL at 14:47

## 2019-12-12 RX ADMIN — TIZANIDINE 4 MG: 4 TABLET ORAL at 21:00

## 2019-12-12 RX ADMIN — ATORVASTATIN CALCIUM 20 MG: 10 TABLET, FILM COATED ORAL at 08:14

## 2019-12-12 RX ADMIN — LIDOCAINE 2 PATCH: 50 PATCH TOPICAL at 08:39

## 2019-12-12 RX ADMIN — ENOXAPARIN SODIUM 40 MG: 40 INJECTION, SOLUTION INTRAVENOUS; SUBCUTANEOUS at 08:15

## 2019-12-12 RX ADMIN — OXYCODONE HYDROCHLORIDE 10 MG: 10 TABLET ORAL at 08:40

## 2019-12-12 RX ADMIN — FLUOXETINE HYDROCHLORIDE 10 MG: 20 TABLET, FILM COATED ORAL at 08:14

## 2019-12-12 RX ADMIN — METFORMIN HYDROCHLORIDE 500 MG: 500 TABLET ORAL at 17:25

## 2019-12-12 RX ADMIN — CYCLOBENZAPRINE 10 MG: 10 TABLET, FILM COATED ORAL at 08:15

## 2019-12-12 ASSESSMENT — ENCOUNTER SYMPTOMS
CHILLS: 0
BRUISES/BLEEDS EASILY: 0
SHORTNESS OF BREATH: 0
NAUSEA: 0
VOMITING: 0
PALPITATIONS: 0
FEVER: 0
EYES NEGATIVE: 1
COUGH: 0
ABDOMINAL PAIN: 0

## 2019-12-12 NOTE — THERAPY
Physical Therapy   Daily Treatment     Patient Name: Olga Lockhart  Age:  69 y.o., Sex:  female  Medical Record #: [id number]  Today's Date: 12/12/2019     Precautions  Precautions: Spinal / Back Precautions , Cervical Collar  , Fall Risk  Comments: C-collar on at all times, Impulsivity, Balance    Subjective    Patient agreeable to PT.     Objective       12/12/19 1231   Standing Lower Body Exercises   Standing Lower Body Exercises Yes  (// bars, SBA, gait belt, c/s collar)   Hamstring Curl 1 set of 10   Hip Abduction 2 sets of 15   Marching 2 sets of 15   Heel Rise 2 sets of 15   Toe Rise 2 sets of 15   Mini Squat 1 set of 10;Partial   Bed Mobility    Supine to Sit Supervised   Sit to Supine Modified Independent   Sit to Stand Stand by Assist   Scooting Modified Independent   Rolling Supervised   Interdisciplinary Plan of Care Collaboration   Collaboration Comments updated roomboard again   PT Total Time Spent   PT Individual Total Time Spent (Mins) 30   PT Charge Group   PT Gait Training 1   PT Therapeutic Exercise 1       FIM Bed/Chair/Wheelchair Transfers Score: 5 - Standby Prompting/Supervision or Set-up  Bed/Chair/Wheelchair Transfers Description:  (SBA, elevated HOB, increased time, c/s collar, gait belt)    FIM Walking Score:  5 - Standby Prompting/Supervision or Set-up  Walking Description:  (SBA, c/s collar, gait belt, 2x 200 feet (to/from gym), mild bradykinesia, mild Trendelenburg)    FIM Wheelchair Score:    Wheelchair Description:         Assessment    Patient has improving BLE strength and balance during ambulation; updated MD orders for ambulation.    Plan    Standing balance; progress ambulation safety and endurance, reinforce safe stair sequencing; begin family training and education towards pt's goals for mod I d/c.  D/C on Thursday 12/19/19.

## 2019-12-12 NOTE — THERAPY
Occupational Therapy  Daily Treatment     Patient Name: Olga Lockhart  Age:  69 y.o., Sex:  female  Medical Record #: [id number]  Today's Date: 12/12/2019     Precautions  Precautions: Spinal / Back Precautions , Cervical Collar  , Fall Risk  Comments: C-collar on at all times, Impulsivity, Balance    Safety   ADL Safety : Requires Supervision for Safety, Requires Cueing for Safety    Subjective       Objective       12/12/19 1401   Precautions   Precautions Spinal / Back Precautions ;Cervical Collar  ;Fall Risk   Comments C-collar on at all times, Impulsivity, Balance   Vitals   O2 Delivery None (Room Air)   Non Verbal Descriptors   Non Verbal Scale  Calm   Cognition    Level of Consciousness Alert   Standing Upper Body Exercises   Other Exercises Standing at FluidoBike, Level 3, CGA via gait belt, 15 mins, seated rest break x 2, 1.925km   IADL Treatments   Kitchen Mobility Education Sup/SBA functional mobility w/out device to navigate kitchen and retrieve items from fridge/high/low cabinets   Meal Preparation Sup/SBA for simple meal prep (scrammbled eggs) on stove top + clean up.  Noted WB oven door and door opened x 1, Pt presented w/ self recovery.  Pt attempted to clean off stove top while stove top still hot - VQ's for safety awareness.   Interdisciplinary Plan of Care Collaboration   IDT Collaboration with  Nursing   Patient Position at End of Therapy Seated   Collaboration Comments CLOF, administration of meds.   OT Total Time Spent   OT Individual Total Time Spent (Mins) 60   OT Charge Group   OT Therapy Activity 2   OT Therapeutic Exercise  2     SBA functional mobility from room (32-02) to back and main gyms.   Assessment    Pt was alert and cooperative w/ tx.  Limiters include generalized weakness, impaired endurance and safety awareness.    Plan    ADL/IADLs , related mobility  , strength/endurance building  Incorporating C spine precautions, bilateral FMC  actiivty

## 2019-12-12 NOTE — CARE PLAN
Problem: Safety  Goal: Will remain free from falls  Note:   Patient is AOx4 is aware of safety precautions in place and still transfers self and then calls for assistance. Patient educated regarding safety and safety precautions that are in place, patient has alarms set on bed and w/c. Will continue to monitor and educate as needed.     Problem: Pain Management  Goal: Pain level will decrease to patient's comfort goal  Intervention: Follow pain managment plan developed in collaboration with patient and Interdisciplinary Team  Note:   Patient able to verbalize pain level and verbalize an acceptable level of pain.

## 2019-12-12 NOTE — CARE PLAN
Problem: Safety  Goal: Will remain free from falls  Intervention: Implement fall precautions  Note:   Use of call light reinforced to make needs known.Fall precautions and frequent rounding in place for safety.Call light within reach.Will continue to monitor and assess needs and safety.     Problem: Metabolic:  Goal: Ability to maintain appropriate glucose levels will improve  Intervention: Manage blood glucose measurement  Note:   FSBS 134 at hs, coverage not needed.HS snack given.Will continue to monitor.

## 2019-12-12 NOTE — THERAPY
Physical Therapy   Daily Treatment     Patient Name: Olga Lockhart  Age:  69 y.o., Sex:  female  Medical Record #: [id number]  Today's Date: 12/12/2019     Precautions  Precautions: Spinal / Back Precautions , Cervical Collar  , Fall Risk  Comments: C-collar on at all times, Impulsivity, Balance    Subjective    Patient agreeable to PT.     Objective       12/10/19 0901   Vitals   O2 (LPM) 0   O2 Delivery None (Room Air)   Bed Mobility    Sit to Stand Contact Guard Assist  (gait belt, c/s collar, cueing)   Neuro-Muscular Treatments   Comments 2 sets of standing balance (20 min total), in // bars, Min A to CGA: static standing with narrow REBECCA, tandem stance, and SLS with firm vs Airex pad using 1-2 UE support; occasional eyes opened/closed variation.  Only one short seated break between sets.   PT Total Time Spent   PT Individual Total Time Spent (Mins) 30   PT Charge Group   Charges Yes   PT Neuromuscular Re-Education / Balance 1   PT Therapeutic Activities 1     Reviewed call light usage, c/s collar and precautions, safety, and treatment goals.    FIM Walking Score:  5 - Standby Prompting/Supervision or Set-up  Walking Description:  (Min A-CGA at gait belt, c/s collar, mildy increased time, 2x 250 feet (to/from gym), cueing)    FIM Wheelchair Score:  1 - Total Assistance  Wheelchair Description:         Assessment    Patient requires cueing and reinforcement of both safety in standing & call light usage; slowly improving balance; good participation and motivation; improving endurance.    Plan    Continue endurance work; progress ambulation distance, reinforce safe stair sequencing; begin family training or review goals for mod I d/c

## 2019-12-12 NOTE — PROGRESS NOTES
20 staples dc'd from pt's scalp lacerations per dr's order. Sutures on right frontal area intact, will verify with MD if okay to dc. Pt tolerated procedure well. No concerns at this time.

## 2019-12-12 NOTE — THERAPY
Physical Therapy   Daily Treatment     Patient Name: Olga Lockhart  Age:  69 y.o., Sex:  female  Medical Record #: [id number]  Today's Date: 12/12/2019     Precautions  Precautions: Spinal / Back Precautions , Cervical Collar  , Fall Risk  Comments: C-collar on at all times, Impulsivity, Balance    Subjective    Patient agreeable to PT.     Objective       12/12/19 1001   Vitals   O2 (LPM) 0   O2 Delivery None (Room Air)   Bed Mobility    Sit to Stand Contact Guard Assist  (SBA-CGA, gait belt, c/s collar)   Interdisciplinary Plan of Care Collaboration   IDT Collaboration with  Nursing;Speech Therapist;Physician   Collaboration Comments Ambulation status, roomboard updated; handoff to SLP; also discussed pt's NOC pain with pt and MD x5 min   PT Total Time Spent   PT Individual Total Time Spent (Mins) 30   PT Charge Group   PT Gait Training 2     Discussed treatment goals, reinforced call light usage, reviewed cervical precautions and c/s collar use.    FIM Walking Score:  4 - Minimal Assistance  Walking Description:  (SBA with rare CGA, gait belt, c/s collar, no UE support.  3 reps indoors from 350-500 feet.  Mild Trendelenburg, mildly increased time, improving trunk rotations at turns while maintaining balance.)    FIM Wheelchair Score:    Wheelchair Description:         Assessment    Patient requires A for balance occasionally but improving today with decreased LOB noted; improving trunk rotations with turning; pt was able to hold a cup of water for the last 2 long ambulation reps successfully.    Plan    Standing balance; progress ambulation safety and endurance, reinforce safe stair sequencing; begin family training and education towards pt's goals for mod I d/c.  D/C on Thursday 12/19/19.

## 2019-12-12 NOTE — PROGRESS NOTES
Patient care assumed. Report received from [facility] ASHISH Lee. Patient is alert and calm, resting in bed. Call light and bedside table within reach. Will continue to monitor.

## 2019-12-12 NOTE — THERAPY
Physical Therapy   Daily Treatment     Patient Name: Olga Lockhart  Age:  69 y.o., Sex:  female  Medical Record #: [id number]  Today's Date: 12/12/2019     Precautions  Precautions: Spinal / Back Precautions , Cervical Collar  , Fall Risk  Comments: C-collar on at all times, Impulsivity, Balance    Subjective    Attempted to see patient at 1500 but pt requiring more time with Dr. Arredondo.  Patient agreeable to PT at 1515.     Objective       12/10/19 1515   Vitals   O2 (LPM) 0   O2 Delivery None (Room Air)   Bed Mobility    Sit to Stand Contact Guard Assist  (gait belt, c/s collar)   IDT Conference   IDT Conference I have reviewed and discussed the POC and barriers to discharge for this patient.  I am knowledgeable of the patient's needs and have attended the IDT Conference.   Interdisciplinary Plan of Care Collaboration   IDT Collaboration with    (IDT team)   Collaboration Comments IDT conference at 1350: family training with pt's brother-in-law next week, anticipate d/c closer to 12/19, CLOF, strengths & barriers discussed.   PT Total Time Spent   PT Individual Total Time Spent (Mins) 30   PT Charge Group   PT Gait Training 2     Reviewed precautions, safety, and treatment goals.    FIM Walking Score:  5 - Standby Prompting/Supervision or Set-up  Walking Description:  (SBA, c/s collar, gait belt, 2x 200 feet (to/from gym), mild bradykinesia, mild Trendelenburg)      Assessment    Patient has improving endurance and ambulation; mildly impaired task attention and safety.    Plan    Continue endurance work; progress ambulation distance, reinforce safe stair sequencing; begin family training or review goals for mod I d/c

## 2019-12-12 NOTE — THERAPY
"Occupational Therapy  Daily Treatment     Patient Name: Olga Lockhart  Age:  69 y.o., Sex:  female  Medical Record #: [id number]  Today's Date: 12/12/2019     Precautions  Precautions: Spinal / Back Precautions , Cervical Collar  , Fall Risk  Comments: C-collar on at all times, Impulsivity, Balance    Safety   ADL Safety : (P) Requires Supervision for Safety, Requires Cueing for Safety    Subjective    \"I got a fortune cookie a few weeks ago stating that change was going to happen and I need to be prepared for it and accept it. 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 It put so much mileage on my car.. and on me! I'm in my 70s and I deserve to retire now. I will make it work.\"     Objective       12/12/19 0931   Safety    ADL Safety  Requires Supervision for Safety;Requires Cueing for Safety   Cognition    Orientation Level Oriented x 4   Level of Consciousness Alert   Safety Awareness Impaired   Comments Discussed challenges with med sorting activity with SLP. Pt came to the realization that the med box having 4 columns instead of 3 (morning, afternoon, and night) really challenged her. She also said her days of the week go the opposite direction. This is good insight in to things that may be challenging for her.    IADL Treatments   Comments OTR began grocery shop with pt when she needed to get to the restroom quickly for a bowel movement.    Balance   Sitting Balance (Static) Good   Sitting Balance (Dynamic) Good   Standing Balance (Static) Fair   Standing Balance (Dynamic) Fair   Interdisciplinary Plan of Care Collaboration   Patient Position at End of Therapy Seated;Call Light within Reach;Tray Table within Reach;Phone within Reach;Self Releasing Lap Belt Applied   OT Total Time Spent   OT Individual Total Time Spent (Mins) 30   OT Charge Group   OT Self Care / ADL 1   OT Therapy Activity 1     Pt buttoned and unbuttoned jemima garsia " "x3 for FMC.       FIM Upper Body Dressin - Standby Prompting/Supervision or Set-up  Upper Body Dressing Description:  (don/doff jemima jacket with buttons)    FIM Lower Body Dressing Score:  5 - Standby Prompting/Supervsion or Set-up  Lower Body Dressing Description:  (donning socks)    FIM Toileting Body Dressin - Standby Prompting/Supervision or Set-up  Toileting Description:  Grab bar, Increased time, Supervision for safety    FIM Bed/Chair/Wheelchair Transfers Score: 5 - Standby Prompting/Supervision or Set-up  Bed/Chair/Wheelchair Transfers Description:  Increased time, Supervision for safety    FIM Toilet Transfer Score:  5 - Standby Prompting/Supervision or Set-up  Toilet Transfer Description:  Grab bar, Increased time, Supervision for safety        Assessment    Pt had somewhat depressed affect but stated \"I'm not going to cry. I haven't cried yet.\" several times. Pt processed her feelings adjusting to disability and changes that have occurred with it. OTR educated pt on coping skills that may be helpful for her during this time. Pt had good insight regarding reasons why med sort activity was so challenging for her based on type of med box in clinic vs the one she uses at home. Pt had complaints of poor FMC, however, did well with buttoning and unbuttoning jemima jacket. Pt progressing with ADL safety.    Plan    ADL/IADLs , related mobility  , strength/endurance building  Incorporating C spine precautions, bilateral FMC  actiivty    "

## 2019-12-12 NOTE — THERAPY
Speech Language Pathology  Daily Treatment     Patient Name: Olga Lockhart  Age:  69 y.o., Sex:  female  Medical Record #: [id number]  Today's Date: 12/12/2019     Subjective    Pt was pleasant and cooperative during this ST session     Objective       12/12/19 1031   SLP Total Time Spent   SLP Individual Total Time Spent (Mins) 60   Charge Group   SLP Cognitive Skill Development 4       Assessment    Pt continued a checkbook management task from yesterdays ST session and required mod cues for attention to achieve 100% accuracy.  Pt frequently lost her place on this task and required extra time to locate which transaction she was subtracting.  Pt also required min A to correctly use the calculator (pt continuously forgot to enter decimal point).  Pt reported that she had recently taken a muscle relaxer and a 10 mg oxycodone and felt like she was unable to focus and remain awake.      Plan    Continue targeting attention tasks

## 2019-12-12 NOTE — THERAPY
"Physical Therapy   Daily Treatment     Patient Name: Olga Lockhart  Age:  69 y.o., Sex:  female  Medical Record #: [id number]  Today's Date: 12/11/2019     Precautions  Precautions: Spinal / Back Precautions , Cervical Collar  , Fall Risk  Comments: C-collar on at all times, Impulsivity, Balance    Subjective    Patient agreeable to PT.     Objective       12/11/19 1231   Precautions   Precautions Spinal / Back Precautions ;Cervical Collar  ;Fall Risk   Comments C-collar on at all times, Impulsivity, Balance   Vitals   O2 (LPM) 0   O2 Delivery None (Room Air)   Standing Lower Body Exercises   Standing Lower Body Exercises Yes  (// bars, c/s collar, CGA at gait belt, cueing)   Hip Abduction 2 sets of 10   Heel Rise 2 sets of 10   Toe Rise 2 sets of 10   Bed Mobility    Supine to Sit Supervised   Sit to Supine Supervised   Sit to Stand Contact Guard Assist   Scooting Supervised   Rolling Supervised   Neuro-Muscular Treatments   Comments Standing balance in // bars with focus on no BUE support: firm surface with narrow and standard REBECCA, eyes opened vs closed; also, Airex pad with narrow vs standard REBECCA, and eyes opened vs closed; SBA-Min A at gait belt; c/s collar donned as usual.  x35 min   Interdisciplinary Plan of Care Collaboration   IDT Collaboration with     Collaboration Comments Per CM email, \"Olga’s brother in law for family training is [place] with phone #[phone].\"   PT Total Time Spent   PT Individual Total Time Spent (Mins) 60   PT Charge Group   PT Therapeutic Exercise 1   PT Neuromuscular Re-Education / Balance 2   PT Therapeutic Activities 1     Discussed pt's balance limitations and call light usage for patient safety, including at NOC.  Also worked on ambulation to/from bathroom at SBA level until impaired task attention required CGA at pt's gait belt for improved safety; c/s collar as usual.    FIM Bed/Chair/Wheelchair Transfers Score: 5 - Standby Prompting/Supervision or " Set-up  Bed/Chair/Wheelchair Transfers Description:  (SBA, c/s collar, gait belt, bed rail, and elevated HOB per pt preference (at 19 deg), mildly increased time, SPV bed mobility)    FIM Toilet Transfer Score:  4 - Minimal Assistance  Toilet Transfer Description:  (Min A for one LOB, CGA otherwise, c/s collar, gait belt, grab bar, increased time)      Assessment    Patient is still most limited by standing balance, then safety, then BLE strength/endurance.  Slow improvement continues but requires external cueing or environmental setup for improved quality and safety.  Repeated education to improve carryover functionally.    Plan    Standing balance; progress ambulation safety and endurance, reinforce safe stair sequencing; begin family training and education towards pt's goals for mod I d/c
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
## 2019-12-13 LAB
BASOPHILS # BLD AUTO: 0.3 % (ref 0–1.8)
BASOPHILS # BLD: 0.02 K/UL (ref 0–0.12)
EOSINOPHIL # BLD AUTO: 0.05 K/UL (ref 0–0.51)
EOSINOPHIL NFR BLD: 0.7 % (ref 0–6.9)
ERYTHROCYTE [DISTWIDTH] IN BLOOD BY AUTOMATED COUNT: 60.4 FL (ref 35.9–50)
HCT VFR BLD AUTO: 30.1 % (ref 37–47)
HGB BLD-MCNC: 9.5 G/DL (ref 12–16)
IMM GRANULOCYTES # BLD AUTO: 0.07 K/UL (ref 0–0.11)
IMM GRANULOCYTES NFR BLD AUTO: 1 % (ref 0–0.9)
LYMPHOCYTES # BLD AUTO: 1.78 K/UL (ref 1–4.8)
LYMPHOCYTES NFR BLD: 25.4 % (ref 22–41)
MCH RBC QN AUTO: 30.3 PG (ref 27–33)
MCHC RBC AUTO-ENTMCNC: 31.6 G/DL (ref 33.6–35)
MCV RBC AUTO: 95.9 FL (ref 81.4–97.8)
MONOCYTES # BLD AUTO: 0.38 K/UL (ref 0–0.85)
MONOCYTES NFR BLD AUTO: 5.4 % (ref 0–13.4)
NEUTROPHILS # BLD AUTO: 4.7 K/UL (ref 2–7.15)
NEUTROPHILS NFR BLD: 67.2 % (ref 44–72)
NRBC # BLD AUTO: 0.02 K/UL
NRBC BLD-RTO: 0.3 /100 WBC
PLATELET # BLD AUTO: 70 K/UL (ref 164–446)
PMV BLD AUTO: 10.9 FL (ref 9–12.9)
RBC # BLD AUTO: 3.14 M/UL (ref 4.2–5.4)
WBC # BLD AUTO: 7 K/UL (ref 4.8–10.8)

## 2019-12-13 PROCEDURE — G0515 COGNITIVE SKILLS DEVELOPMENT: HCPCS

## 2019-12-13 PROCEDURE — 700102 HCHG RX REV CODE 250 W/ 637 OVERRIDE(OP): Performed by: PHYSICAL MEDICINE & REHABILITATION

## 2019-12-13 PROCEDURE — 97116 GAIT TRAINING THERAPY: CPT

## 2019-12-13 PROCEDURE — A9270 NON-COVERED ITEM OR SERVICE: HCPCS | Performed by: HOSPITALIST

## 2019-12-13 PROCEDURE — 700102 HCHG RX REV CODE 250 W/ 637 OVERRIDE(OP): Performed by: HOSPITALIST

## 2019-12-13 PROCEDURE — 97535 SELF CARE MNGMENT TRAINING: CPT

## 2019-12-13 PROCEDURE — A9270 NON-COVERED ITEM OR SERVICE: HCPCS | Performed by: PHYSICAL MEDICINE & REHABILITATION

## 2019-12-13 PROCEDURE — 36415 COLL VENOUS BLD VENIPUNCTURE: CPT

## 2019-12-13 PROCEDURE — 85025 COMPLETE CBC W/AUTO DIFF WBC: CPT

## 2019-12-13 PROCEDURE — 97112 NEUROMUSCULAR REEDUCATION: CPT

## 2019-12-13 PROCEDURE — 700101 HCHG RX REV CODE 250: Performed by: PHYSICAL MEDICINE & REHABILITATION

## 2019-12-13 PROCEDURE — 770010 HCHG ROOM/CARE - REHAB SEMI PRIVAT*

## 2019-12-13 PROCEDURE — 97530 THERAPEUTIC ACTIVITIES: CPT

## 2019-12-13 RX ADMIN — LISINOPRIL 10 MG: 5 TABLET ORAL at 09:00

## 2019-12-13 RX ADMIN — LIDOCAINE 2 PATCH: 50 PATCH TOPICAL at 09:00

## 2019-12-13 RX ADMIN — ATORVASTATIN CALCIUM 20 MG: 10 TABLET, FILM COATED ORAL at 09:00

## 2019-12-13 RX ADMIN — OXYCODONE HYDROCHLORIDE 5 MG: 5 TABLET ORAL at 19:59

## 2019-12-13 RX ADMIN — TIZANIDINE 4 MG: 4 TABLET ORAL at 19:56

## 2019-12-13 RX ADMIN — ACETAMINOPHEN 650 MG: 325 TABLET ORAL at 08:51

## 2019-12-13 RX ADMIN — METFORMIN HYDROCHLORIDE 500 MG: 500 TABLET ORAL at 08:00

## 2019-12-13 RX ADMIN — FLUOXETINE HYDROCHLORIDE 10 MG: 20 TABLET, FILM COATED ORAL at 09:00

## 2019-12-13 RX ADMIN — METFORMIN HYDROCHLORIDE 500 MG: 500 TABLET ORAL at 17:55

## 2019-12-13 RX ADMIN — TIZANIDINE 4 MG: 4 TABLET ORAL at 09:00

## 2019-12-13 NOTE — THERAPY
Occupational Therapy  Daily Treatment     Patient Name: Olga Lockhart  Age:  69 y.o., Sex:  female  Medical Record #: [id number]  Today's Date: 12/13/2019     Precautions  Precautions: Spinal / Back Precautions , Cervical Collar  , Fall Risk  Comments: C-collar on at all times, Impulsivity, Balance    Safety   ADL Safety : Requires Supervision for Safety, Requires Cueing for Safety    Subjective     Objective    Assessment    Plan    Please refer to note of scanned documentation in media tab

## 2019-12-13 NOTE — THERAPY
"Recreational Therapy  Daily Treatment     Patient Name: Olga Lockhart  AGE:  69 y.o., SEX:  female  Medical Record #: [id number]  Today's Date: 12/12/2019       Subjective    \"I've had a busy day.\" Pt was ready and willing for session. \"I liked that game.\"     Objective       12/12/19 1501   Functional Ability Status - Cognitive   Attention Span Remains on Task   Comprehension Follows Three Step Commands   Judgment Able to Make Independent Decisions   Functional Ability Status - Emotional    Affect Appropriate   Mood Appropriate   Behavior Appropriate   Skilled Intervention    Skilled Intervention Cognitive Leisure   Skilled Intervention Comments [place] maze and new card game.    Interdisciplinary Plan of Care Collaboration   IDT Collaboration with  Nursing   Patient Position at End of Therapy Edge of Bed   Collaboration Comments Nursing attending to Pt.    Treatment Time   Total Time Spent (mins) 30   Procedural Tracking   Procedural Tracking Cognitive Skills Training       Assessment    Pt was taught a new card game involving addition. She required mod verbal cues for reminders of the rules. She was able to set up the game on her own without cues the second time.     Plan    Continue cognitive leisure.   "

## 2019-12-13 NOTE — THERAPY
Speech Language Pathology  Daily Treatment     Patient Name: Olga Lockhart  Age:  69 y.o., Sex:  female  Medical Record #: [id number]  Today's Date: 12/13/2019     Subjective    Pt was pleasant and cooperative during this ST session      Objective       12/13/19 1501   SLP Total Time Spent   SLP Individual Total Time Spent (Mins) 30   Charge Group   SLP Cognitive Skill Development 2       Assessment    Pt completed a problem solving task requiring her to locate errors in medication organizers.  Pt was able to locate errors in medication organizers with 2-4 medications independently with 100% accuracy.  Pt was able to locate errors with 6-8 medications independently with approximately 80% accuracy, min A was required for attention to locate 1-2 remaining errors on each task.      Plan    Continue targeting complex attention tasks

## 2019-12-13 NOTE — THERAPY
Speech Language Pathology  Daily Treatment     Patient Name: Olga Lockhart  Age:  69 y.o., Sex:  female  Medical Record #: [id number]  Today's Date: 12/13/2019     Subjective    Pt was pleasant and cooperative during this ST session      Objective       12/13/19 1301   SLP Total Time Spent   SLP Individual Total Time Spent (Mins) 30   Charge Group   SLP Cognitive Skill Development 2       Assessment    Pt participated in a ST session targeting dual-tasking.  Pt completed complex alternating attention tasks while walking and required min cues to continue walking when working on a difficult task.  Pt frequently slowed down her gait speed or stopped all together when she could not think of the correct answer, but with min cues was able to continue both tasks.       Plan    Continue targeting complex attention

## 2019-12-14 PROCEDURE — 99232 SBSQ HOSP IP/OBS MODERATE 35: CPT | Performed by: HOSPITALIST

## 2019-12-14 PROCEDURE — 770010 HCHG ROOM/CARE - REHAB SEMI PRIVAT*

## 2019-12-14 PROCEDURE — 700101 HCHG RX REV CODE 250: Performed by: PHYSICAL MEDICINE & REHABILITATION

## 2019-12-14 PROCEDURE — 700102 HCHG RX REV CODE 250 W/ 637 OVERRIDE(OP): Performed by: HOSPITALIST

## 2019-12-14 PROCEDURE — 700102 HCHG RX REV CODE 250 W/ 637 OVERRIDE(OP): Performed by: PHYSICAL MEDICINE & REHABILITATION

## 2019-12-14 PROCEDURE — A9270 NON-COVERED ITEM OR SERVICE: HCPCS | Performed by: HOSPITALIST

## 2019-12-14 PROCEDURE — A9270 NON-COVERED ITEM OR SERVICE: HCPCS | Performed by: PHYSICAL MEDICINE & REHABILITATION

## 2019-12-14 RX ADMIN — LIDOCAINE 2 PATCH: 50 PATCH TOPICAL at 08:52

## 2019-12-14 RX ADMIN — ATORVASTATIN CALCIUM 20 MG: 10 TABLET, FILM COATED ORAL at 08:33

## 2019-12-14 RX ADMIN — TIZANIDINE 4 MG: 4 TABLET ORAL at 15:32

## 2019-12-14 RX ADMIN — METFORMIN HYDROCHLORIDE 500 MG: 500 TABLET ORAL at 08:32

## 2019-12-14 RX ADMIN — TIZANIDINE 4 MG: 4 TABLET ORAL at 21:39

## 2019-12-14 RX ADMIN — METFORMIN HYDROCHLORIDE 500 MG: 500 TABLET ORAL at 18:26

## 2019-12-14 RX ADMIN — TIZANIDINE 4 MG: 4 TABLET ORAL at 08:33

## 2019-12-14 RX ADMIN — OXYCODONE HYDROCHLORIDE 5 MG: 5 TABLET ORAL at 21:50

## 2019-12-14 RX ADMIN — LISINOPRIL 10 MG: 5 TABLET ORAL at 09:25

## 2019-12-14 RX ADMIN — FLUOXETINE HYDROCHLORIDE 10 MG: 20 TABLET, FILM COATED ORAL at 08:32

## 2019-12-14 RX ADMIN — OXYCODONE HYDROCHLORIDE 10 MG: 10 TABLET ORAL at 08:55

## 2019-12-14 ASSESSMENT — ENCOUNTER SYMPTOMS
SHORTNESS OF BREATH: 0
NAUSEA: 0
VOMITING: 0
ABDOMINAL PAIN: 0
EYES NEGATIVE: 1
CHILLS: 0
FEVER: 0
BRUISES/BLEEDS EASILY: 0
COUGH: 0
PALPITATIONS: 0
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
## 2019-12-14 NOTE — THERAPY
12/13/19 1001   Interdisciplinary Plan of Care Collaboration   Collaboration Comments Due to our electronic downtime today, the PT daily treatment note can be found uploaded under the Media tab.   PT Total Time Spent   PT Individual Total Time Spent (Mins) 75   PT Charge Group   PT Gait Training 3   PT Neuromuscular Re-Education / Balance 1   PT Therapeutic Activities 1

## 2019-12-15 PROCEDURE — 97110 THERAPEUTIC EXERCISES: CPT

## 2019-12-15 PROCEDURE — 700102 HCHG RX REV CODE 250 W/ 637 OVERRIDE(OP): Performed by: HOSPITALIST

## 2019-12-15 PROCEDURE — A9270 NON-COVERED ITEM OR SERVICE: HCPCS | Performed by: PHYSICAL MEDICINE & REHABILITATION

## 2019-12-15 PROCEDURE — 97530 THERAPEUTIC ACTIVITIES: CPT

## 2019-12-15 PROCEDURE — G0515 COGNITIVE SKILLS DEVELOPMENT: HCPCS

## 2019-12-15 PROCEDURE — 700102 HCHG RX REV CODE 250 W/ 637 OVERRIDE(OP): Performed by: PHYSICAL MEDICINE & REHABILITATION

## 2019-12-15 PROCEDURE — 99232 SBSQ HOSP IP/OBS MODERATE 35: CPT | Performed by: HOSPITALIST

## 2019-12-15 PROCEDURE — 97116 GAIT TRAINING THERAPY: CPT

## 2019-12-15 PROCEDURE — A9270 NON-COVERED ITEM OR SERVICE: HCPCS | Performed by: HOSPITALIST

## 2019-12-15 PROCEDURE — 770010 HCHG ROOM/CARE - REHAB SEMI PRIVAT*

## 2019-12-15 PROCEDURE — 700101 HCHG RX REV CODE 250: Performed by: PHYSICAL MEDICINE & REHABILITATION

## 2019-12-15 RX ADMIN — TRAZODONE HYDROCHLORIDE 50 MG: 50 TABLET ORAL at 22:19

## 2019-12-15 RX ADMIN — METFORMIN HYDROCHLORIDE 500 MG: 500 TABLET ORAL at 17:30

## 2019-12-15 RX ADMIN — OXYCODONE HYDROCHLORIDE 5 MG: 5 TABLET ORAL at 19:53

## 2019-12-15 RX ADMIN — METFORMIN HYDROCHLORIDE 500 MG: 500 TABLET ORAL at 08:15

## 2019-12-15 RX ADMIN — ATORVASTATIN CALCIUM 20 MG: 10 TABLET, FILM COATED ORAL at 08:15

## 2019-12-15 RX ADMIN — LISINOPRIL 10 MG: 5 TABLET ORAL at 08:15

## 2019-12-15 RX ADMIN — TIZANIDINE 4 MG: 4 TABLET ORAL at 19:53

## 2019-12-15 RX ADMIN — LIDOCAINE 2 PATCH: 50 PATCH TOPICAL at 09:45

## 2019-12-15 RX ADMIN — FLUOXETINE HYDROCHLORIDE 10 MG: 20 TABLET, FILM COATED ORAL at 08:16

## 2019-12-15 RX ADMIN — TIZANIDINE 4 MG: 4 TABLET ORAL at 08:16

## 2019-12-15 RX ADMIN — TIZANIDINE 4 MG: 4 TABLET ORAL at 15:23

## 2019-12-15 ASSESSMENT — ENCOUNTER SYMPTOMS
ABDOMINAL PAIN: 0
FEVER: 0
BRUISES/BLEEDS EASILY: 0
SHORTNESS OF BREATH: 0
COUGH: 0
PALPITATIONS: 0
EYES NEGATIVE: 1
CHILLS: 0
NAUSEA: 0
VOMITING: 0

## 2019-12-15 NOTE — THERAPY
"Occupational Therapy  Daily Treatment     Patient Name: Olga Lockhart  Age:  69 y.o., Sex:  female  Medical Record #: [id number]  Today's Date: 12/15/2019     Precautions  Precautions: (P) Spinal / Back Precautions , Fall Risk  Comments: (P) C-collar on at all times, Impulsivity, Balance    Safety   ADL Safety : Requires Supervision for Safety, Requires Cueing for Safety    Subjective    \"  I feel good now .\" stated at end of session      Objective       12/15/19 1031   Precautions   Precautions Spinal / Back Precautions ;Fall Risk   Comments C-collar on at all times, Impulsivity, Balance   Sitting Upper Body Exercises   Upper Extremity Bike Level 3 Resistance  (x 5 minutes x 2   hydro cycle )   Fine Motor / Dexterity    Fine Motor / Dexterity Interventions  seated at table  graded FMC tasks  focus on indiviudal finger to thumb pinch picking up dice and small beaded pegs and placing on table top /  dropping into cup. inhand manipluation   using dice , small beaded peg and coins,  picking up dropping into palm   placing on table top  pitching into dish and  pushing coins through slotted lid into container.    Balance   Standing Balance (Dynamic) Fair   Comments  ring toss activity  reciprocal step and toss  x 14 reps x 2  reciproal setp and toss    with no LOB.  no hand on solid surface for support.   standing in  parallel bars  on foam pad  static stand  ring toss x 14 reps x 2   one slight LOB with no assist to recover.  patient used parallel bar for recovery    Interdisciplinary Plan of Care Collaboration   Patient Position at End of Therapy Seated;Edge of Bed       FIM Lower Body Dressing Score:  7 - Independent  Lower Body Dressing Description:  (doff /don slip on shoes )    FIM Bed/Chair/Wheelchair Transfers Score: 5 - Standby Prompting/Supervision or Set-up  Bed/Chair/Wheelchair Transfers Description:       FIM Walking Score:  5 - Standby Prompting/Supervision or Set-up  Walking Description:  (no device  room " <-> gym with supervision /SBA)      Assessment     mild difficulty with FMC using  Coins for in hand manipulation. Participates to best of her ability       Plan  ADL/IADLs , related mobility  , strength/endurance building  Incorporating C spine precautions, bilateral FMC  actiivty

## 2019-12-15 NOTE — THERAPY
Physical Therapy   Daily Treatment     Patient Name: Olga Lockhart  Age:  69 y.o., Sex:  female  Medical Record #: [id number]  Today's Date: 12/15/2019     Precautions  Precautions: (P) Spinal / Back Precautions   Comments: (P) C-collar on at all times    Subjective    Patient asking to be able to go to the bathroom; reports a day off of therapy results in excessive back stiffness     Objective       12/15/19 1401   Precautions   Precautions Spinal / Back Precautions    Comments C-collar on at all times   Sitting Lower Body Exercises   Other Exercises Nu-Step LE only 8min for endurance   PT Total Time Spent   PT Individual Total Time Spent (Mins) 60   PT Charge Group   PT Gait Training 2   PT Therapeutic Exercise 1   PT Therapeutic Activities 1       FIM Bed/Chair/Wheelchair Transfers Score: 6 - Modified Independent  Bed/Chair/Wheelchair Transfers Description:  (mod I SPT)    FIM Walking Score:  5 - Standby Prompting/Supervision or Set-up  Walking Description:  (amb 1000ft no AD SBA for wayfinding)    Assessment    Able to complete scavenger hunt activity in 10min and reach and carry 6 objects with no LOB, min assist for wayfinding; patient reports she could have walked further during activity    Able to perform 6 4in stairs x 2 with one rail SBA    Plan  10MWT for endurance, outdoor ambulation with SPC?; continue dual task ambulation; review stairs
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
## 2019-12-15 NOTE — CARE PLAN
Problem: Communication  Goal: The ability to communicate needs accurately and effectively will improve  Outcome: PROGRESSING AS EXPECTED  Patient remains alert and oriented x 4.      Problem: Safety  Goal: Will remain free from injury  Outcome: PROGRESSING AS EXPECTED  Pt uses call light consistently and appropriately. Waits for assistance does not attempt self transfer this shift. Able to verbalize needs.

## 2019-12-15 NOTE — CARE PLAN
Problem: Safety  Goal: Will remain free from injury  Outcome: PROGRESSING AS EXPECTED   Patient demonstrates good safety technique this shift.  Asks for assistance when needed and does not attempt self transfer.  Able to verbalize needs.  Will continue to monitor.   Problem: Pain Management  Goal: Pain level will decrease to patient's comfort goal  Outcome: PROGRESSING AS EXPECTED   Patient able to verbalize pain level and verbalize an acceptable level of pain.

## 2019-12-15 NOTE — THERAPY
Speech Language Pathology  Daily Treatment     Patient Name: Olga Lockhart  Age:  69 y.o., Sex:  female  Medical Record #: [id number]  Today's Date: 12/15/2019     Subjective    Pt pleasant and cooperative during tx.       Objective       12/15/19 0831   Cognition   Complex Attention Supervision (5)   Complex Reasoning  / Problem Solving Minimal (4)   SLP Total Time Spent   SLP Individual Total Time Spent (Mins) 60   Charge Group   SLP Cognitive Skill Development 4       FIM Comprehension Score:  7 - Independent  Comprehension Description:       FIM Expression Score:  7 - Independent  Expression Description:       FIM Social Interaction Score:  6 - Modified Independent  Social Interaction Description:  Medication    FIM Problem Solving Score:  5 - Standby Prompting/Supervision or Set-up  Problem Solving Description:  Verbal cueing, Increased time    FIM Memory Score:  4 - Minimal Assistance  Memory Description:  Verbal cueing, Therapy schedule      Assessment    Alternating attn: pt was required to state the name of place and a person as she progressed through the alphabet: 92% independent; 100% Cricket.  Logical schedulin% supervision.  Deductive reasoning (3x6): 75% independent; 100% given min-mod verbal cues to attend to written details.      Plan    Target high level reasoning and attn.

## 2019-12-16 PROCEDURE — 700102 HCHG RX REV CODE 250 W/ 637 OVERRIDE(OP): Performed by: PHYSICAL MEDICINE & REHABILITATION

## 2019-12-16 PROCEDURE — 97112 NEUROMUSCULAR REEDUCATION: CPT

## 2019-12-16 PROCEDURE — 97535 SELF CARE MNGMENT TRAINING: CPT

## 2019-12-16 PROCEDURE — G0515 COGNITIVE SKILLS DEVELOPMENT: HCPCS

## 2019-12-16 PROCEDURE — 99232 SBSQ HOSP IP/OBS MODERATE 35: CPT | Performed by: PHYSICAL MEDICINE & REHABILITATION

## 2019-12-16 PROCEDURE — 99231 SBSQ HOSP IP/OBS SF/LOW 25: CPT | Performed by: HOSPITALIST

## 2019-12-16 PROCEDURE — 700101 HCHG RX REV CODE 250: Performed by: PHYSICAL MEDICINE & REHABILITATION

## 2019-12-16 PROCEDURE — 97530 THERAPEUTIC ACTIVITIES: CPT

## 2019-12-16 PROCEDURE — A9270 NON-COVERED ITEM OR SERVICE: HCPCS | Performed by: PHYSICAL MEDICINE & REHABILITATION

## 2019-12-16 PROCEDURE — A9270 NON-COVERED ITEM OR SERVICE: HCPCS | Performed by: HOSPITALIST

## 2019-12-16 PROCEDURE — 700102 HCHG RX REV CODE 250 W/ 637 OVERRIDE(OP): Performed by: HOSPITALIST

## 2019-12-16 PROCEDURE — 97110 THERAPEUTIC EXERCISES: CPT

## 2019-12-16 PROCEDURE — 770010 HCHG ROOM/CARE - REHAB SEMI PRIVAT*

## 2019-12-16 RX ORDER — CYCLOBENZAPRINE HCL 10 MG
5 TABLET ORAL 3 TIMES DAILY PRN
Status: DISCONTINUED | OUTPATIENT
Start: 2019-12-16 | End: 2019-12-19 | Stop reason: HOSPADM

## 2019-12-16 RX ADMIN — LIDOCAINE 2 PATCH: 50 PATCH TOPICAL at 08:02

## 2019-12-16 RX ADMIN — METFORMIN HYDROCHLORIDE 500 MG: 500 TABLET ORAL at 08:00

## 2019-12-16 RX ADMIN — LISINOPRIL 10 MG: 5 TABLET ORAL at 07:59

## 2019-12-16 RX ADMIN — ACETAMINOPHEN 650 MG: 325 TABLET ORAL at 20:35

## 2019-12-16 RX ADMIN — ACETAMINOPHEN 650 MG: 325 TABLET ORAL at 10:01

## 2019-12-16 RX ADMIN — CYCLOBENZAPRINE HYDROCHLORIDE 5 MG: 10 TABLET, FILM COATED ORAL at 20:35

## 2019-12-16 RX ADMIN — ATORVASTATIN CALCIUM 20 MG: 10 TABLET, FILM COATED ORAL at 08:00

## 2019-12-16 RX ADMIN — TIZANIDINE 4 MG: 4 TABLET ORAL at 07:59

## 2019-12-16 RX ADMIN — FLUOXETINE HYDROCHLORIDE 10 MG: 20 TABLET, FILM COATED ORAL at 07:59

## 2019-12-16 RX ADMIN — METFORMIN HYDROCHLORIDE 500 MG: 500 TABLET ORAL at 17:47

## 2019-12-16 ASSESSMENT — ENCOUNTER SYMPTOMS
BRUISES/BLEEDS EASILY: 0
SHORTNESS OF BREATH: 0
ABDOMINAL PAIN: 0
VOMITING: 0
NAUSEA: 0
CHILLS: 0
FEVER: 0
COUGH: 0
PALPITATIONS: 0
EYES NEGATIVE: 1

## 2019-12-16 ASSESSMENT — 6 MINUTE WALK TEST (6MWT)
TOTAL DISTANCE WALKED (FT): 1325
LEVEL OF ASSISTANCE: SUPERVISION
NUMBER OF RESTS: 0
GAIT SPEED - METERS PER SECOND: 1.12

## 2019-12-16 ASSESSMENT — PATIENT HEALTH QUESTIONNAIRE - PHQ9
2. FEELING DOWN, DEPRESSED, IRRITABLE, OR HOPELESS: NOT AT ALL
1. LITTLE INTEREST OR PLEASURE IN DOING THINGS: NOT AT ALL
SUM OF ALL RESPONSES TO PHQ9 QUESTIONS 1 AND 2: 0
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
## 2019-12-16 NOTE — THERAPY
Recreational Therapy  Daily Treatment     Patient Name: Olga Lockhart  AGE:  69 y.o., SEX:  female  Medical Record #: [id number]  Today's Date: 12/16/2019       Subjective    Ready and willing for session.      Objective       12/16/19 1301   Functional Ability Status - Cognitive   Attention Span Remains on Task   Comprehension Follows Two Step Commands   Judgment Needs Assistance   Cognitive Comments Min to Mod verbal cues to complete cognitive task.    Functional Ability Status - Emotional    Affect Appropriate   Mood Appropriate   Behavior Appropriate   Skilled Intervention    Skilled Intervention Cognitive Leisure   Skilled Intervention Comments Newton holder   Interdisciplinary Plan of Care Collaboration   Patient Position at End of Therapy In Bed;Call Light within Reach;Tray Table within Reach   Treatment Time   Total Time Spent (mins) 30   Procedural Tracking   Procedural Tracking Cognitive Skills Training       Assessment    Pt was given a cognitive task and required Min to Mod verbal cues to solve the tasks. She spoke that cognitive activities are what she needs to be working on while remaining at rehab.     Plan  Cognitive leisure activities.

## 2019-12-16 NOTE — THERAPY
"Physical Therapy   Daily Treatment     Patient Name: Olga Lockhart  Age:  69 y.o., Sex:  female  Medical Record #: [id number]  Today's Date: 12/16/2019     Precautions  Precautions: Fall Risk, Cervical Collar  , Spinal / Back Precautions   Comments: C-collar on at all times, Mod I in room    Subjective    Pt standing in room (mod I), agreeable to PT      Objective       12/16/19 1401   Precautions   Precautions Fall Risk;Cervical Collar  ;Spinal / Back Precautions    Comments C-collar on at all times, Mod I in room   Sitting Lower Body Exercises   Nustep Resistance Level 4  (10 min, 472 steps)   6 Minute Walk Test   Distance (feet) 1325   Gait Speed (meters per second) 1.12   Number of Rests 0   Level of Assistance 5   Interdisciplinary Plan of Care Collaboration   Patient Position at End of Therapy Seated;Call Light within Reach;Tray Table within Reach   PT Total Time Spent   PT Individual Total Time Spent (Mins) 60   PT Charge Group   PT Therapeutic Exercise 1   PT Neuromuscular Re-Education / Balance 1   PT Therapeutic Activities 2     \"obstacle course\" for balance and coordination: 2 bolsters, 1 5\" step, agility ladder with varying patterns, ladderball while standing on air-ex, 3x each direction, no AD, SBA.     FIM Walking Score:  5 - Standby Prompting/Supervision or Set-up  Walking Description:  Supervision for safety(1529 ft and room <> gym, spv )    FIM Stairs Score:  5 - Standby Prompting/Supervision or Set-up  Stairs Description:  Hand rails, Supervision for safety(12 4\" steps with single HR and distant spv )      Assessment    Pt with only one mild LOB and independent recovery during obstacle course activity. Results of 6 minute walk test show walking speed below average speed for age group, however safe and no LOB.     Plan    Outdoor ambulation with SPC?; continue dual task ambulation; review stairs, high level dynamic balance activities, overall strength and endurance.        "

## 2019-12-16 NOTE — THERAPY
Speech Language Pathology  Daily Treatment     Patient Name: Olga Lockhart  Age:  69 y.o., Sex:  female  Medical Record #: [id number]  Today's Date: 12/16/2019     Subjective    Pt was pleasant and cooperative during this  session, c/o back pain and was given a hot pack during this session      Objective       12/16/19 1001   SLP Total Time Spent   SLP Individual Total Time Spent (Mins) 60   Charge Group   SLP Cognitive Skill Development 4       Assessment    Pt required min cues to recall tasks that she completed over the weekend in .  Pt completed an organization task requiring her to sort through a stack of bills/receipts and write down debits, credits and dates of the expenses.  Pt was able to write down credits/debits independently with 100% accuracy.  Pt was then asked to total the debits and credits and required mod cues to problem solve strategies for use of the calculator and how to avoid having to start over when adding large lists of numbers (breaking numbers down into groups of 3-4).  Pt required several breaks during this task d/t increased frustration and difficulty maintaining attention.    Plan    Continue credit/debit calculation task, target complex attention, STM

## 2019-12-16 NOTE — PROGRESS NOTES
DATE OF SERVICE:  12/10/2019    The patient was seen for followup visit.  The patient is doing well overall.    She reports improvements in her strength and endurance.  Her mood has been   upbeat.  Patient says she is taking everything in stride.  She says she is   looking forward to her discharge.  She was spoken to at length about her   various goals.       ____________________________________     BINDU MERINO, PHD    SINTIA / SYBIL    DD:  12/15/2019 12:12:30  DT:  12/16/2019 00:32:04    D#:  [id number]  Job#:  [id number]

## 2019-12-16 NOTE — CARE PLAN
Problem: Safety  Goal: Will remain free from injury  Outcome: PROGRESSING AS EXPECTED  Intervention: Educate patient and significant other/support system about adaptive mobility strategies and safe transfers  Note:   Pt transfers mod I in the room without using assistive device. Calls appropriately when in need of assistance.      Problem: Pain Management  Goal: Pain level will decrease to patient's comfort goal  Intervention: Follow pain managment plan developed in collaboration with patient and Interdisciplinary Team  Note:   Roxicodone 5mg given PRN per MAR for c/o back pain with adequate relief. Pt sleeps good with requested trazodone. Will continue to monitor.
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
## 2019-12-16 NOTE — PROGRESS NOTES
DATE OF SERVICE:  12/13/2019    The patient continues to do well at followup as reported previously.  She is   upbeat and positive.  She says she is working on her balance, coordination,   and endurance.  She had no complaints.  She talked about some of her long-term   goals after she returns home.       ____________________________________     BINDU MERINO, PHD    SINTIA / SYBIL    DD:  12/15/2019 12:49:38  DT:  12/16/2019 00:30:02    D#:  [id number]  Job#:  [id number]

## 2019-12-16 NOTE — REHAB-PHARMACY IDT TEAM NOTE
Pharmacy   Pharmacy  Antibiotics/Antifungals/Antivirals:  Medication:      Active Orders (From admission, onward)    None        Route:        NA  Stop Date:  NA  Reason:      NA  Antihypertensives/Cardiac:  Medication:    Orders (72h ago, onward)     Start     Ordered    12/07/19 0900  lisinopril (PRINIVIL) tablet 10 mg  DAILY      12/06/19 1703    12/06/19 0900  atorvastatin (LIPITOR) tablet 20 mg  DAILY      12/05/19 1531    12/05/19 1531  hydrALAZINE (APRESOLINE) tablet 25 mg  EVERY 8 HOURS PRN      12/05/19 1531              Patient Vitals for the past 24 hrs:   BP Pulse   12/16/19 1253 102/67 85   12/16/19 0700 132/74 84   12/15/19 1855 112/75 98   12/15/19 1615 118/50 85     Anticoagulation:  Medication: None                                     Other key medications: A review of the medication list reveals no issues at this time. Patient is currently on antihypertensive(s). Recommend home blood pressure monitoring/recording if antihypertensive(s) regimen(s) continue. Patient is currently on diabetic medication(s) and/or Insulin(s). Recommend home blood glucose monitoring/recording if these regimen(s) continue.    Section completed by: Milo Marti [facility]

## 2019-12-16 NOTE — THERAPY
"Occupational Therapy  Daily Treatment     Patient Name: Olga Lockhart  Age:  69 y.o., Sex:  female  Medical Record #: [id number]  Today's Date: 2019     Precautions  Precautions: Fall Risk, Cervical Collar  , Spinal / Back Precautions   Comments: C-collar on at all times, Mod I in room    Safety   ADL Safety : Requires Supervision for Safety, Requires Cueing for Safety    Subjective    \"I'd love to shower this morning\"     Objective       19 0831   Precautions   Precautions Fall Risk;Cervical Collar  ;Spinal / Back Precautions    Comments C-collar on at all times, Mod I in room   Vitals   O2 Delivery None (Room Air)   Pain   Intervention Repositioned   Pain 0 - 10 Group   Location Back;Neck   Location Orientation Mid   Description Aching   Therapist Pain Assessment Post Activity Pain Same as Prior to Activity  (Nursing had placed lidocaine patch traps prior to OT tx  )   Non Verbal Descriptors   Non Verbal Scale  Calm   Cognition    Level of Consciousness Alert   Interdisciplinary Plan of Care Collaboration   IDT Collaboration with  Certified Nursing Assistant   Patient Position at End of Therapy In Bed;Call Light within Reach;Tray Table within Reach;Phone within Reach   Collaboration Comments Pt Mod I in room, CLOF   OT Total Time Spent   OT Individual Total Time Spent (Mins) 60   OT Charge Group   OT Self Care / ADL 4       FIM Eating Score:  6 - Modified Independent  Eating Description:  Increased time    FIM Grooming Score:  6 - Modified Independent  Grooming Description:  Increased time(Standing at sink side for groom/hygiene)    FIM Bathing Score:  5 - Standby Prompting/Supervision or Set-up  Bathing Description:       FIM Upper Body Dressin - Modified Independent  Upper Body Dressing Description:  Increased time(Mod I to don/doff pull over shirt and cervical collar)    FIM Lower Body Dressing Score:  7 - Independent  Lower Body Dressing Description:  (Indep to manage LB clothing)    FIM " Toiletin - Modified Independent  Toileting Description:  Increased time, Grab bar(Mod I for toileting task via grab bar (LB clothing management and toilet hygiene))    FIM Bed/Chair/Wheelchair Transfers Score: 6 - Modified Independent  Bed/Chair/Wheelchair Transfers Description:  Increased time    FIM Toilet Transfer Score:  6 - Modified Independent  Toilet Transfer Description:  Grab bar, Increased time(SPT to/from commode via grab bar)    FIM Tub/Shower Transfers Score:  6 - Modified Independent  Tub/Shower Transfers Description:  Increased time, Grab bar(SPT to/from shower bench via grab bar)      Assessment    Pt was alert and cooperative w/ tx.  Limiters include spinal/cervical precautions, generalized weakness and limited endurance.  Pt cleared for Mod I in room w/out device - reviewed w/ nursing.    Plan    ADL/IADLs , related mobility  , strength/endurance building  Incorporating C spine precautions, bilateral FMC  actiivty
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
## 2019-12-17 LAB
ANION GAP SERPL CALC-SCNC: 8 MMOL/L (ref 0–11.9)
BASOPHILS # BLD AUTO: 1.1 % (ref 0–1.8)
BASOPHILS # BLD: 0.03 K/UL (ref 0–0.12)
BUN SERPL-MCNC: 19 MG/DL (ref 8–22)
CALCIUM SERPL-MCNC: 9.5 MG/DL (ref 8.5–10.5)
CHLORIDE SERPL-SCNC: 110 MMOL/L (ref 96–112)
CO2 SERPL-SCNC: 23 MMOL/L (ref 20–33)
CREAT SERPL-MCNC: 0.88 MG/DL (ref 0.5–1.4)
EOSINOPHIL # BLD AUTO: 0.08 K/UL (ref 0–0.51)
EOSINOPHIL NFR BLD: 2.9 % (ref 0–6.9)
ERYTHROCYTE [DISTWIDTH] IN BLOOD BY AUTOMATED COUNT: 56.4 FL (ref 35.9–50)
GLUCOSE SERPL-MCNC: 157 MG/DL (ref 65–99)
HCT VFR BLD AUTO: 30 % (ref 37–47)
HGB BLD-MCNC: 9.5 G/DL (ref 12–16)
IMM GRANULOCYTES # BLD AUTO: 0.01 K/UL (ref 0–0.11)
IMM GRANULOCYTES NFR BLD AUTO: 0.4 % (ref 0–0.9)
LYMPHOCYTES # BLD AUTO: 1.27 K/UL (ref 1–4.8)
LYMPHOCYTES NFR BLD: 46 % (ref 22–41)
MCH RBC QN AUTO: 29.9 PG (ref 27–33)
MCHC RBC AUTO-ENTMCNC: 31.7 G/DL (ref 33.6–35)
MCV RBC AUTO: 94.3 FL (ref 81.4–97.8)
MONOCYTES # BLD AUTO: 0.16 K/UL (ref 0–0.85)
MONOCYTES NFR BLD AUTO: 5.8 % (ref 0–13.4)
NEUTROPHILS # BLD AUTO: 1.21 K/UL (ref 2–7.15)
NEUTROPHILS NFR BLD: 43.8 % (ref 44–72)
NRBC # BLD AUTO: 0 K/UL
NRBC BLD-RTO: 0 /100 WBC
PLATELET # BLD AUTO: 79 K/UL (ref 164–446)
PMV BLD AUTO: 10.2 FL (ref 9–12.9)
POTASSIUM SERPL-SCNC: 4.4 MMOL/L (ref 3.6–5.5)
RBC # BLD AUTO: 3.18 M/UL (ref 4.2–5.4)
SODIUM SERPL-SCNC: 141 MMOL/L (ref 135–145)
WBC # BLD AUTO: 2.8 K/UL (ref 4.8–10.8)

## 2019-12-17 PROCEDURE — 97530 THERAPEUTIC ACTIVITIES: CPT

## 2019-12-17 PROCEDURE — G0515 COGNITIVE SKILLS DEVELOPMENT: HCPCS

## 2019-12-17 PROCEDURE — 85025 COMPLETE CBC W/AUTO DIFF WBC: CPT

## 2019-12-17 PROCEDURE — 80048 BASIC METABOLIC PNL TOTAL CA: CPT

## 2019-12-17 PROCEDURE — 700102 HCHG RX REV CODE 250 W/ 637 OVERRIDE(OP): Performed by: PHYSICAL MEDICINE & REHABILITATION

## 2019-12-17 PROCEDURE — 700102 HCHG RX REV CODE 250 W/ 637 OVERRIDE(OP): Performed by: HOSPITALIST

## 2019-12-17 PROCEDURE — A9270 NON-COVERED ITEM OR SERVICE: HCPCS | Performed by: PHYSICAL MEDICINE & REHABILITATION

## 2019-12-17 PROCEDURE — 97110 THERAPEUTIC EXERCISES: CPT

## 2019-12-17 PROCEDURE — 700101 HCHG RX REV CODE 250: Performed by: PHYSICAL MEDICINE & REHABILITATION

## 2019-12-17 PROCEDURE — 770010 HCHG ROOM/CARE - REHAB SEMI PRIVAT*

## 2019-12-17 PROCEDURE — A9270 NON-COVERED ITEM OR SERVICE: HCPCS | Performed by: HOSPITALIST

## 2019-12-17 PROCEDURE — 99232 SBSQ HOSP IP/OBS MODERATE 35: CPT | Performed by: HOSPITALIST

## 2019-12-17 PROCEDURE — 36415 COLL VENOUS BLD VENIPUNCTURE: CPT

## 2019-12-17 PROCEDURE — 99233 SBSQ HOSP IP/OBS HIGH 50: CPT | Performed by: PHYSICAL MEDICINE & REHABILITATION

## 2019-12-17 PROCEDURE — 97116 GAIT TRAINING THERAPY: CPT

## 2019-12-17 RX ADMIN — ACETAMINOPHEN 650 MG: 325 TABLET ORAL at 22:43

## 2019-12-17 RX ADMIN — ATORVASTATIN CALCIUM 20 MG: 10 TABLET, FILM COATED ORAL at 08:43

## 2019-12-17 RX ADMIN — TRAZODONE HYDROCHLORIDE 50 MG: 50 TABLET ORAL at 22:43

## 2019-12-17 RX ADMIN — METFORMIN HYDROCHLORIDE 500 MG: 500 TABLET ORAL at 17:42

## 2019-12-17 RX ADMIN — LIDOCAINE 2 PATCH: 50 PATCH TOPICAL at 08:44

## 2019-12-17 RX ADMIN — TRAZODONE HYDROCHLORIDE 50 MG: 50 TABLET ORAL at 00:12

## 2019-12-17 RX ADMIN — METFORMIN HYDROCHLORIDE 500 MG: 500 TABLET ORAL at 08:43

## 2019-12-17 RX ADMIN — FLUOXETINE HYDROCHLORIDE 10 MG: 20 TABLET, FILM COATED ORAL at 08:43

## 2019-12-17 RX ADMIN — LISINOPRIL 10 MG: 5 TABLET ORAL at 08:43

## 2019-12-17 ASSESSMENT — ENCOUNTER SYMPTOMS
ABDOMINAL PAIN: 0
SHORTNESS OF BREATH: 0
NERVOUS/ANXIOUS: 0
NAUSEA: 0
DIARRHEA: 0
FEVER: 0
VOMITING: 0
CHILLS: 0

## 2019-12-17 NOTE — THERAPY
"Recreational Therapy  Daily Treatment     Patient Name: Olga Lockhart  AGE:  69 y.o., SEX:  female  Medical Record #: [id number]  Today's Date: 12/17/2019       Subjective    \"I didn't sleep well last night.\"     Objective       12/17/19 0901   Functional Ability Status - Cognitive   Attention Span Remains on Task   Comprehension Follows Three Step Commands   Judgment Able to Make Independent Decisions   Functional Ability Status - Emotional    Affect Appropriate   Mood Appropriate   Behavior Appropriate   Skilled Intervention    Skilled Intervention Cognitive Leisure   Skilled Intervention Comments Newton holder   Interdisciplinary Plan of Care Collaboration   Patient Position at End of Therapy Edge of Bed;Tray Table within Reach;Call Light within Reach   Treatment Time   Total Time Spent (mins) 30   Procedural Tracking   Procedural Tracking Cognitive Skills Training       Assessment    Pt was provided the same activity and same levels as a means to see if she would remember how to accomplish the task. She asked if she had seen this writer yesterday for a session. She was seen by this writer for a session the prior day. She was unable to share the reasoning for how the cognitive activity worked without min verbal cues.     Plan    Cognitive leisure.   "

## 2019-12-17 NOTE — REHAB-COLLABORATIVE ONGOING IDT TEAM NOTE
Weekly Interdisciplinary Team Conference Note    Weekly Interdisciplinary Team Conference # 2  Date:  12/17/2019    Clinicians present and reporting at team conference include the following:   MD: Jean Carlos Brunson DO   RN:  Kenya Casper RN    PT:   Todd Preciado PT, DPT  OT:  Madi Wen MS, OTR/L    ST:  Almas Fleming MS, CCC-SLP  CM:  Cristine Roldan RN [facility]  REC:  Prateek Caceres CTRS  RT:  Ghazal Escobedo, RRT  RPh:  Sujey Mtz [facility]  All reporting clinicians have a working knowledge of this patient's plan of care.    Targeted DC Date:  12.19.19     Medical    Patient Active Problem List    Diagnosis Date Noted   • Discharge planning issues 12/05/2019     Priority: High   • Fracture of fifth cervical vertebra (HCC) 12/03/2019     Priority: High   • Encounter for geriatric assessment 12/05/2019     Priority: Medium   • Type 2 diabetes mellitus (HCC) 12/03/2019     Priority: Medium   • Hypertension 12/03/2019     Priority: Medium   • Scalp laceration, initial encounter 12/03/2019     Priority: Medium   • Trauma 12/03/2019     Priority: Low   • Hyperlipidemia 12/03/2019     Priority: Low   • Depression 12/10/2019   • Azotemia 12/10/2019   • Constipation 12/06/2019   • Pancytopenia (HCC) 12/06/2019   • Mild TBI (HCC) 12/05/2019     Results     Procedure Component Value Ref Range Date/Time    ESTIMATED GFR [[id number]] Collected:  12/17/19 0635    Order Status:  Completed Lab Status:  Final result Updated:  12/17/19 0718     GFR If African American >60 >60 mL/min/1.73 m 2      GFR If Non African American >60 >60 mL/min/1.73 m 2     Basic Metabolic Panel [[id number]]  (Abnormal) Collected:  12/17/19 0635    Order Status:  Completed Lab Status:  Final result Updated:  12/17/19 0718    Specimen:  Blood      Sodium 141 135 - 145 mmol/L      Potassium 4.4 3.6 - 5.5 mmol/L      Chloride 110 96 - 112 mmol/L      Co2 23 20 - 33 mmol/L      Glucose 157 65 - 99 mg/dL      Bun 19 8 - 22 mg/dL      Creatinine 0.88 0.50 -  1.40 mg/dL      Calcium 9.5 8.5 - 10.5 mg/dL      Anion Gap 8.0 0.0 - 11.9     CBC WITH DIFFERENTIAL [[id number]]  (Abnormal) Collected:  12/17/19 0635    Order Status:  Completed Lab Status:  Final result Updated:  12/17/19 0658    Specimen:  Blood      WBC 2.8 4.8 - 10.8 K/uL      RBC 3.18 4.20 - 5.40 M/uL      Hemoglobin 9.5 12.0 - 16.0 g/dL      Hematocrit 30.0 37.0 - 47.0 %      MCV 94.3 81.4 - 97.8 fL      MCH 29.9 27.0 - 33.0 pg      MCHC 31.7 33.6 - 35.0 g/dL      RDW 56.4 35.9 - 50.0 fL      Platelet Count 79 164 - 446 K/uL      MPV 10.2 9.0 - 12.9 fL      Neutrophils-Polys 43.80 44.00 - 72.00 %      Lymphocytes 46.00 22.00 - 41.00 %      Monocytes 5.80 0.00 - 13.40 %      Eosinophils 2.90 0.00 - 6.90 %      Basophils 1.10 0.00 - 1.80 %      Immature Granulocytes 0.40 0.00 - 0.90 %      Nucleated RBC 0.00 /100 WBC      Neutrophils (Absolute) 1.21 2.00 - 7.15 K/uL      Comment: Includes immature neutrophils, if present.        Lymphs (Absolute) 1.27 1.00 - 4.80 K/uL      Monos (Absolute) 0.16 0.00 - 0.85 K/uL      Eos (Absolute) 0.08 0.00 - 0.51 K/uL      Baso (Absolute) 0.03 0.00 - 0.12 K/uL      Immature Granulocytes (abs) 0.01 0.00 - 0.11 K/uL      NRBC (Absolute) 0.00 K/uL         Nursing  Diet/Nutrition:  Diabetic and Thin Liquids  Medication Administration:  Whole with Liquid Wash  % consumed at meals in last 24 hours:  Consumed % of meals per documentation.  Meal/Snack Consumption for the past 24 hrs:   Oral Nutrition   12/16/19 1900 Dinner;Between 25-50% Consumed   12/16/19 0831 Breakfast;Between % Consumed     Snack schedule:  HS  Supplement:  none  Appetite:  Good  Fluid Intake/Output in past 24 hours: In: 480 [P.O.:480]  Out: -   Admit Weight:  Weight: 85.5 kg (188 lb 7.9 oz)  Weight Last 7 Days   [85.1 kg (187 lb 9.8 oz)-86.6 kg (190 lb 14.4 oz)] 85.1 kg (187 lb 9.8 oz) (12/15 1035)    Pain Issues:    Location:  Back;Neck (12/16 2035)  Mid (12/16 2035)         Severity:  Mild    Scheduled pain medications:  lidoderm patch 2    PRN pain medications used in last 24 hours:  acetaminophen (TYLENOL)    Non Pharmacologic Interventions:  distraction, emotional support, food, relaxation and rest  Effectiveness of pain management plan:  good=patient states acceptable comfort after interventions    Bowel:    Bowel Assist Initial Score:  4 - Minimal Assistance  Bowel Assist Current Score:  6 - Modified Independent  Bowl Accidents in last 7 days:     Last bowel movement: 12/16/19(per pt)  Stool Description: Large, Soft     Usual bowel pattern:  daily  Scheduled bowel medications:  None  PRN bowel medications used in last 24 hours:  None  Nursing Interventions:  Increased time, PRN medication, Scheduled medication, Set-up  Effectiveness of bowel program:   good=regular, predictable, controlled emptying of bowel  Bladder:    Bladder Assist Initial Score:  4 - Minimal Assistance  Bladder Assist Current Score:  6 - Modified Independent  Bladder Accidents in last 7 days:     PVR range for past 24-48 hours: -- ()  Intermittent Catheterization:  0  Medications affecting bladder:  None    Time void schedule/voiding pattern:  Voiding every 2-4 hours  Interventions:  Increased time  Effectiveness of bladder training:  Good=regular, predictable, emptying of bladder, patient initiates time voiding  Wound:         Patient Lines/Drains/Airways Status    Active Current Wounds     Name: Placement date: Placement time: Site: Days:    Wound 12/04/19 Laceration Head  12/04/19   0000   no documentation  13                   Interventions: scabbing; open to air; clean, dry & intact  Effectiveness of intervention:  wound is improving       Sleep/wake cycle:   Average hours slept:  Sleeps 4-6 hours without waking  Sleep medication usage:  Other trazodone    Patient/Family Training/Education:  Diabetes Management, Diet/Nutrition, Fall Prevention, General Self Care, Medication Management, Pain Management, Safe Transfers,  Safety and Skin Care  Discharge Supply Recommendations:  Glucometer and Strips  Strengths: Alert and oriented, Willingly participates in therapeutic activities, Able to follow instructions, Supportive family, Pleasant and cooperative, Effective communication skills, Good carryover of learning, Motivated for self care and independence, Good endurance and Manages pain appropriately   Barriers:   Fatigue and Generalized weakness       Nursing Problems     Problem: Bowel/Gastric:     Description:     Goal: Normal bowel function is maintained or improved     Description:           Goal: Will not experience complications related to bowel motility     Description:                 Problem: Communication     Description:     Goal: The ability to communicate needs accurately and effectively will improve     Description:                 Problem: Discharge Barriers/Planning     Description:     Goal: Patient's continuum of care needs will be met     Description:                 Problem: Infection     Description:     Goal: Will remain free from infection     Description:                 Problem: Knowledge Deficit     Description:     Goal: Knowledge of disease process/condition, treatment plan, diagnostic tests, and medications will improve     Description:           Goal: Knowledge of the prescribed therapeutic regimen will improve     Description:                 Problem: Metabolic:     Description:     Goal: Ability to maintain appropriate glucose levels will improve     Description:                 Problem: Pain Management     Description:     Goal: Pain level will decrease to patient's comfort goal     Description:                 Problem: Safety     Description:     Goal: Will remain free from injury     Description:           Goal: Will remain free from falls     Description:                 Problem: Skin Integrity     Description:     Goal: Risk for impaired skin integrity will decrease     Description:                  "Problem: Venous Thromboembolism (VTW)/Deep Vein Thrombosis (DVT) Prevention:     Description:     Goal: Patient will participate in Venous Thrombosis (VTE)/Deep Vein Thrombosis (DVT)Prevention Measures     Description:     Flowsheet:     Taken at 12/07/19 9752    Pharmacologic Prophylaxis Used LMWH: Enoxaparin(Lovenox) by  Gretel Gary R.N.                             Long Term Goals:   At discharge patient will be able to function safely at home and in the community with support.    Section completed by:  Brooklyn Bustamante R.N.           Mobility  Bed mobility:  Mod I, c/s collar, increased time  Bed /Chair/Wheelchair Transfer Initial:  1 - Total Assistance  Bed /Chair/Wheelchair Transfer Current:  6 - Modified Independent   Bed/Chair/Wheelchair Transfer Description:  Increased time  Walk Initial:  2 - Max Assistance  Walk Current:  5 - Standby Prompting/Supervision or Set-up   Walk Description:  Supervision for safety(1529 ft and room <> gym, spv )  Wheelchair Initial:  1 - Total Assistance  Wheelchair Current:  1 - Total Assistance   Wheelchair Description:  (40ft using bilateral UE/LE; fatigues quickly; requires constant cues for w/c skills)  Stairs Initial:  4 - Minimal Assistance  Stairs Current: 5 - Standby Prompting/Supervision or Set-up   Stairs Description: Hand rails, Supervision for safety(12 4\" steps with single HR and distant spv )  Patient/Family Training/Education:  Ongoing patient education; family training not required  DME/DC Recommendations:  Intermittent distant SPV; no AD; clarification on c/s collar need; Outpatient PT as transportation allows  Strengths:  Able to follow instructions, Adequate strength, Alert and oriented, Effective communication skills, Good endurance, Independent PLOF, Making steady progress towards goals, Manages pain appropriately, Motivated for self care and independence, Pleasant and cooperative and Willingly participates in therapeutic activities  Barriers:   Other: " Cervical collar restricts AROM, Patient transportation  # of short term goals set= 5  # of short term goals met= 5; proceeding to work on LTG progress  Physical Therapy Problems     Problem: PT-Long Term Goals     Dates: Start: 12/06/19       Description:     Goal: LTG-By discharge, patient will ambulate     Dates: Start: 12/06/19       Description: 1) Individualized goal:  500 feet, c/s collar, SPC prn, Mod I level  2) Interventions:  PT Group Therapy, PT Gait Training, PT Therapeutic Exercises, PT TENS Application, PT Neuro Re-Ed/Balance, PT Aquatic Therapy, PT Therapeutic Activity and PT Manual Therapy             Goal: LTG-By discharge, patient will transfer one surface to another     Dates: Start: 12/06/19       Description: 1) Individualized goal:  c/s collar, Mod I level  2) Interventions:  PT Group Therapy, PT Gait Training, PT Therapeutic Exercises, PT TENS Application, PT Neuro Re-Ed/Balance, PT Aquatic Therapy, PT Therapeutic Activity and PT Manual Therapy               Goal: LTG-By discharge, patient will ambulate up/down 4-6 stairs     Dates: Start: 12/06/19       Description: 1) Individualized goal:  c/s collar, R railing, Mod I level  2) Interventions:  PT Group Therapy, PT Gait Training, PT Therapeutic Exercises, PT TENS Application, PT Neuro Re-Ed/Balance, PT Aquatic Therapy, PT Therapeutic Activity and PT Manual Therapy               Goal: LTG-By discharge, patient will transfer in/out of a car     Dates: Start: 12/06/19       Description: 1) Individualized goal: c/s collar, Mod I level  2) Interventions: PT Group Therapy, PT Gait Training, PT Therapeutic Exercises, PT TENS Application, PT Neuro Re-Ed/Balance, PT Aquatic Therapy, PT Therapeutic Activity and PT Manual Therapy                           Section completed by:  Todd Preciado, PT    Activities of Daily Living  Eating Initial:  6 - Modified Independent  Eating Current:  6 - Modified Independent   Eating Description:  Increased  time  Grooming Initial:  4 - Minimal Assistance  Grooming Current:  6 - Modified Independent   Grooming Description:  Increased time(Standing at sink side for groom/hygiene)  Bathing Initial:  3 - Moderate Assistance  Bathing Current:  5 - Standby Prompting/Supervision or Set-up   Bathing Description:  Tub bench, Long handled bath tool, Hand held shower, Increased time, Supervision for safety(Sup/SBA in stand via grab bar for kodi.  Mod I for remainder.)  Upper Body Dressing Initial:  4 - Minimal Assistance  Upper Body Dressing Current:  6 - Modified Independent   Upper Body Dressing Description:  Increased time(Mod I to don/doff pull over shirt and cervical collar)  Lower Body Dressing Initial:  4 - Minimal Assistance  Lower Body Dressing Current:  7 - Independent   Lower Body Dressing Description:  7 - Independent  Toileting Initial:  4 - Minimal Assistance  Toileting Current:  6 - Modified Independent   Toileting Description:  Increased time, Grab bar(Mod I for toileting task via grab bar (LB clothing management and toilet hygiene))  Toilet Transfer Initial:  4 - Minimal Assistance  Toilet Transfer Current:  6 - Modified Independent   Toilet Transfer Description:  6 - Modified Independent  Tub / Shower Transfer Initial:  4 - Minimal Assistance  Tub / Shower Transfer Current:  6 - Modified Independent   Tub / Shower Transfer Description:  Increased time, Grab bar(SPT to/from shower bench via grab bar)  IADL:  Ongoing   Family Training/Education:  TBD   DME/DC Recommendations:  TBD     Strengths:  Independent PLOF, Making steady progress towards goals, Motivated for self care and independence, Pleasant and cooperative and Willingly participates in therapeutic activities  Barriers:  Decreased endurance and Generalized weakness     # of short term goals set= 4    # of short term goals met= 3     Occupational Therapy Goals     Problem: Bathing     Dates: Start: 12/17/19       Description:     Goal: STG-Within one  week, patient will bathe     Dates: Start: 12/17/19       Description: 1) Individualized Goal:  Mod Indep for shower task w/ AE/DME PRN.  2) Interventions:  OT Self Care/ADL, OT Neuro Re-Ed/Balance, OT Therapeutic Activity, OT Evaluation and OT Therapeutic Exercise                   Problem: IADL's     Dates: Start: 12/06/19       Description:     Goal: STG-Within one week, patient will prepare a meal     Dates: Start: 12/06/19       Description: 1) Individualized Goal:  Mod I for simple meal prep via AE/DME pRN  2) Interventions:  OT Self Care/ADL, OT Neuro Re-Ed/Balance, OT Therapeutic Activity, OT Evaluation and OT Therapeutic Exercise       Note:     Goal Note filed on 12/17/19 0821 by Madi Wen MS,OTR/L    Pt at Sup/SBA for simple meal prep - safety awareness.                        Problem: OT Long Term Goals     Dates: Start: 12/06/19       Description:     Goal: LTG-By discharge, patient will complete basic self care tasks     Dates: Start: 12/06/19       Description: 1) Individualized Goal:  Mod I for BADL's via AE/DME PRN  2) Interventions:  OT Self Care/ADL, OT Neuro Re-Ed/Balance, OT Therapeutic Activity, OT Evaluation and OT Therapeutic Exercise             Goal: LTG-By discharge, patient will perform bathroom transfers     Dates: Start: 12/06/19       Description: 1) Individualized Goal:  Mod I for bathroom transfer via DME pRN  2) Interventions:  OT Self Care/ADL, OT Neuro Re-Ed/Balance, OT Therapeutic Activity, OT Evaluation and OT Therapeutic Exercise                         Section completed by:  Madi Wen MS,OTR/L    Cognitive Linquistic Functions  Comprehension Initial:  7 - Independent  Comprehension Current:  7 - Independent   Comprehension Description:     Expression Initial:  7 - Independent  Expression Current:  7 - Independent   Expression Description:     Social Interaction Initial:  6 - Modified Independent  Social Interaction Current:  6 - Modified Independent   Social Interaction  Description:  Medication  Problem Solving Initial:  6 - Modified Independent  Problem Solving Current:  6 - Modified Independent   Problem Solving Description:  Verbal cueing, Increased time  Memory Initial:  3 - Moderate Assistance  Memory Current:  5 - Standby Prompting/Supervision or Set-up   Memory Description:  Verbal cueing, Therapy schedule  Executive Functioning / Organization Initial:   NA  Executive Functioning / Organization Current:   Spv-Min A   Executive Functioning Desciption:  Increased time   Swallowing  Swallowing Status:  Not following for dysphagia management   Orders Placed This Encounter   Procedures   • Diet Order Diabetic     Standing Status:   Standing     Number of Occurrences:   1     Order Specific Question:   Diet:     Answer:   Diabetic [3]     Behavior Modification Plan  Analyze tasks (break down into smaller steps)  Assistive Technology  Memory aides: and Low tech: Calendar, planner, schedule, alarms/timers, pill organizer, post-it notes, lists  Family Training/Education:  Scheduled on 12/18 from 3-4:30  DC Recommendations:   Pt would benefit from outpatient ST   Strengths:  Able to follow instructions, Alert and oriented, Effective communication skills, Good insight into deficits/needs, Independent PLOF, Making steady progress towards goals, Motivated for self care and independence, Pleasant and cooperative, Supportive family and Willingly participates in therapeutic activities  Barriers:  Other: Complex attention and organization, STM   # of short term goals set=3  # of short term goals met=3  Speech Therapy Problems     Problem: Problem Solving STGs     Dates: Start: 12/17/19       Description:     Goal: STG-Within one week, patient will     Dates: Start: 12/17/19       Description: 1) Individualized goal:  Complete complex attention/organization tasks with spv to achieve 90% accuracy.    2) Interventions:  SLP Speech Language Treatment, SLP Self Care / ADL Training , SLP Cognitive  Skill Development and SLP Group Treatment                   Problem: Speech/Swallowing LTGs     Dates: Start: 12/06/19       Description:     Goal: LTG-By discharge, patient will solve complex problem     Dates: Start: 12/06/19       Description: 1) Individualized goal:  With modified independence for a safe discharge home   2) Interventions:  SLP Speech Language Treatment, SLP Self Care / ADL Training , SLP Cognitive Skill Development and SLP Group Treatment                          Section completed by:  Almas Fleming MS,[facility]-SLP    Recreation/Community     Leisure Competence Measure  Leisure Awareness: Independent  Leisure Attitude: Independent  Leisure Skills: Minimal Assist  Cultural / Social Behaviors: Independent  Interpersonal Skills: Independent  Community Integration Skills: Modified Independent  Social Contact: Independent  Community Participation: Independent    Strengths:  Motivated for self care and independence, Pleasant and cooperative and Willingly participates in therapeutic activities  Barriers:  Confused and Poor carryover of learning  # of short term goals set=2  # of short term goals met=1    Has been working on cognitive leisure and has poor carryover of learning. Has worked on the same problems two days in a row and did not remember working with this writer the prior day as well as could not repeat solving the cognitive activity without min verbal cues.     Recreation Therapy Problems     Problem: Recreation Therapy     Dates: Start: 12/10/19       Description:     Goal: STG-Within one week, patient will actively engage in planning of community re-integration outing     Dates: Start: 12/10/19       Description:           Goal: LTG-By discharge, patient will participate in a community re-integration outing     Dates: Start: 12/10/19       Description:                       Section completed by:  YURI Vincent       REHAB-Pharmacy IDT Team Note by Milo Marti [facility] at 12/16/2019   3:43 PM  Version 1 of 1    Author:  Mlio Marti RPH Service:  -- Author Type:  Pharmacist    Filed:  12/16/2019  3:44 PM Date of Service:  12/16/2019  3:43 PM Status:  Signed    :  Milo Marti RPH (Pharmacist)         Pharmacy   Pharmacy  Antibiotics/Antifungals/Antivirals:  Medication:      Active Orders (From admission, onward)    None        Route:        NA  Stop Date:  NA  Reason:      NA  Antihypertensives/Cardiac:  Medication:    Orders (72h ago, onward)     Start     Ordered    12/07/19 0900  lisinopril (PRINIVIL) tablet 10 mg  DAILY      12/06/19 1703    12/06/19 0900  atorvastatin (LIPITOR) tablet 20 mg  DAILY      12/05/19 1531    12/05/19 1531  hydrALAZINE (APRESOLINE) tablet 25 mg  EVERY 8 HOURS PRN      12/05/19 1531              Patient Vitals for the past 24 hrs:   BP Pulse   12/16/19 1253 102/67 85   12/16/19 0700 132/74 84   12/15/19 1855 112/75 98   12/15/19 1615 118/50 85     Anticoagulation:  Medication: None                                     Other key medications: A review of the medication list reveals no issues at this time. Patient is currently on antihypertensive(s). Recommend home blood pressure monitoring/recording if antihypertensive(s) regimen(s) continue. Patient is currently on diabetic medication(s) and/or Insulin(s). Recommend home blood glucose monitoring/recording if these regimen(s) continue.    Section completed by: Milo Marti [facility][[email or web address]]     Attribution Key     AW.1 - Milo Marti RP on 12/16/2019  3:43 PM                  DC Planning  DC destination/dispostion:  Home w/ family to [place]. Need to verify address & home status.  Referrals: HH vs OP therapies.  DC Needs: DME for patient safety & mobility. Family training. HH vs OP referral. MD f/u appts.  Barriers to discharge: Functional mobility deficits. Cognitive deficits. Lives alone in [facility] area w/ limited services avail.    Strengths: Motivation. Supportive family. PLOF  independent.    Section completed by:  Cristine Roldan R.N.    Summary: family training rescheduled for Thursday, improved safety awareness, mod independent indoors, ongoing attention & organization deficits. Recommend HH referral.    Physician Summary  Jean Carlos Brunson DO participated and led team conference discussion.
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
## 2019-12-17 NOTE — REHAB-OT IDT TEAM NOTE
Occupational Therapy   Activities of Daily Living  Eating Initial:  6 - Modified Independent  Eating Current:  6 - Modified Independent   Eating Description:  Increased time  Grooming Initial:  4 - Minimal Assistance  Grooming Current:  6 - Modified Independent   Grooming Description:  Increased time(Standing at sink side for groom/hygiene)  Bathing Initial:  3 - Moderate Assistance  Bathing Current:  5 - Standby Prompting/Supervision or Set-up   Bathing Description:  Tub bench, Long handled bath tool, Hand held shower, Increased time, Supervision for safety(Sup/SBA in stand via grab bar for kodi.  Mod I for remainder.)  Upper Body Dressing Initial:  4 - Minimal Assistance  Upper Body Dressing Current:  6 - Modified Independent   Upper Body Dressing Description:  Increased time(Mod I to don/doff pull over shirt and cervical collar)  Lower Body Dressing Initial:  4 - Minimal Assistance  Lower Body Dressing Current:  7 - Independent   Lower Body Dressing Description:  7 - Independent  Toileting Initial:  4 - Minimal Assistance  Toileting Current:  6 - Modified Independent   Toileting Description:  Increased time, Grab bar(Mod I for toileting task via grab bar (LB clothing management and toilet hygiene))  Toilet Transfer Initial:  4 - Minimal Assistance  Toilet Transfer Current:  6 - Modified Independent   Toilet Transfer Description:  6 - Modified Independent  Tub / Shower Transfer Initial:  4 - Minimal Assistance  Tub / Shower Transfer Current:  6 - Modified Independent   Tub / Shower Transfer Description:  Increased time, Grab bar(SPT to/from shower bench via grab bar)  IADL:  Ongoing   Family Training/Education:  TBD   DME/DC Recommendations:  TBD     Strengths:  Independent PLOF, Making steady progress towards goals, Motivated for self care and independence, Pleasant and cooperative and Willingly participates in therapeutic activities  Barriers:  Decreased endurance and Generalized weakness     # of short term  goals set= 4    # of short term goals met= 3     Occupational Therapy Goals     Problem: Bathing     Dates: Start: 12/17/19       Description:     Goal: STG-Within one week, patient will bathe     Dates: Start: 12/17/19       Description: 1) Individualized Goal:  Mod Indep for shower task w/ AE/DME PRN.  2) Interventions:  OT Self Care/ADL, OT Neuro Re-Ed/Balance, OT Therapeutic Activity, OT Evaluation and OT Therapeutic Exercise                   Problem: IADL's     Dates: Start: 12/06/19       Description:     Goal: STG-Within one week, patient will prepare a meal     Dates: Start: 12/06/19       Description: 1) Individualized Goal:  Mod I for simple meal prep via AE/DME pRN  2) Interventions:  OT Self Care/ADL, OT Neuro Re-Ed/Balance, OT Therapeutic Activity, OT Evaluation and OT Therapeutic Exercise       Note:     Goal Note filed on 12/17/19 0821 by Madi Wen MS,OTR/L    Pt at Sup/SBA for simple meal prep - safety awareness.                        Problem: OT Long Term Goals     Dates: Start: 12/06/19       Description:     Goal: LTG-By discharge, patient will complete basic self care tasks     Dates: Start: 12/06/19       Description: 1) Individualized Goal:  Mod I for BADL's via AE/DME PRN  2) Interventions:  OT Self Care/ADL, OT Neuro Re-Ed/Balance, OT Therapeutic Activity, OT Evaluation and OT Therapeutic Exercise             Goal: LTG-By discharge, patient will perform bathroom transfers     Dates: Start: 12/06/19       Description: 1) Individualized Goal:  Mod I for bathroom transfer via DME pRN  2) Interventions:  OT Self Care/ADL, OT Neuro Re-Ed/Balance, OT Therapeutic Activity, OT Evaluation and OT Therapeutic Exercise                         Section completed by:  Madi Wen MS,OTR/L

## 2019-12-17 NOTE — REHAB-NURSING IDT TEAM NOTE
Nursing   Nursing  Diet/Nutrition:  Diabetic and Thin Liquids  Medication Administration:  Whole with Liquid Wash  % consumed at meals in last 24 hours:  Consumed % of meals per documentation.  Meal/Snack Consumption for the past 24 hrs:   Oral Nutrition   12/16/19 1900 Dinner;Between 25-50% Consumed   12/16/19 0831 Breakfast;Between % Consumed     Snack schedule:  HS  Supplement:  none  Appetite:  Good  Fluid Intake/Output in past 24 hours: In: 480 [P.O.:480]  Out: -   Admit Weight:  Weight: 85.5 kg (188 lb 7.9 oz)  Weight Last 7 Days   [85.1 kg (187 lb 9.8 oz)-86.6 kg (190 lb 14.4 oz)] 85.1 kg (187 lb 9.8 oz) (12/15 1035)    Pain Issues:    Location:  Back;Neck (12/16 2035)  Mid (12/16 2035)         Severity:  Mild   Scheduled pain medications:  lidoderm patch 2    PRN pain medications used in last 24 hours:  acetaminophen (TYLENOL)    Non Pharmacologic Interventions:  distraction, emotional support, food, relaxation and rest  Effectiveness of pain management plan:  good=patient states acceptable comfort after interventions    Bowel:    Bowel Assist Initial Score:  4 - Minimal Assistance  Bowel Assist Current Score:  6 - Modified Independent  Bowl Accidents in last 7 days:     Last bowel movement: 12/16/19(per pt)  Stool Description: Large, Soft     Usual bowel pattern:  daily  Scheduled bowel medications:  None  PRN bowel medications used in last 24 hours:  None  Nursing Interventions:  Increased time, PRN medication, Scheduled medication, Set-up  Effectiveness of bowel program:   good=regular, predictable, controlled emptying of bowel  Bladder:    Bladder Assist Initial Score:  4 - Minimal Assistance  Bladder Assist Current Score:  6 - Modified Independent  Bladder Accidents in last 7 days:     PVR range for past 24-48 hours: -- ()  Intermittent Catheterization:  0  Medications affecting bladder:  None    Time void schedule/voiding pattern:  Voiding every 2-4 hours  Interventions:  Increased  time  Effectiveness of bladder training:  Good=regular, predictable, emptying of bladder, patient initiates time voiding  Wound:         Patient Lines/Drains/Airways Status    Active Current Wounds     Name: Placement date: Placement time: Site: Days:    Wound 12/04/19 Laceration Head  12/04/19   0000   no documentation  13                   Interventions: scabbing; open to air; clean, dry & intact  Effectiveness of intervention:  wound is improving       Sleep/wake cycle:   Average hours slept:  Sleeps 4-6 hours without waking  Sleep medication usage:  Other trazodone    Patient/Family Training/Education:  Diabetes Management, Diet/Nutrition, Fall Prevention, General Self Care, Medication Management, Pain Management, Safe Transfers, Safety and Skin Care  Discharge Supply Recommendations:  Glucometer and Strips  Strengths: Alert and oriented, Willingly participates in therapeutic activities, Able to follow instructions, Supportive family, Pleasant and cooperative, Effective communication skills, Good carryover of learning, Motivated for self care and independence, Good endurance and Manages pain appropriately   Barriers:   Fatigue and Generalized weakness       Nursing Problems     Problem: Bowel/Gastric:     Description:     Goal: Normal bowel function is maintained or improved     Description:           Goal: Will not experience complications related to bowel motility     Description:                 Problem: Communication     Description:     Goal: The ability to communicate needs accurately and effectively will improve     Description:                 Problem: Discharge Barriers/Planning     Description:     Goal: Patient's continuum of care needs will be met     Description:                 Problem: Infection     Description:     Goal: Will remain free from infection     Description:                 Problem: Knowledge Deficit     Description:     Goal: Knowledge of disease process/condition, treatment plan,  diagnostic tests, and medications will improve     Description:           Goal: Knowledge of the prescribed therapeutic regimen will improve     Description:                 Problem: Metabolic:     Description:     Goal: Ability to maintain appropriate glucose levels will improve     Description:                 Problem: Pain Management     Description:     Goal: Pain level will decrease to patient's comfort goal     Description:                 Problem: Safety     Description:     Goal: Will remain free from injury     Description:           Goal: Will remain free from falls     Description:                 Problem: Skin Integrity     Description:     Goal: Risk for impaired skin integrity will decrease     Description:                 Problem: Venous Thromboembolism (VTW)/Deep Vein Thrombosis (DVT) Prevention:     Description:     Goal: Patient will participate in Venous Thrombosis (VTE)/Deep Vein Thrombosis (DVT)Prevention Measures     Description:     Flowsheet:     Taken at 12/07/19 1774    Pharmacologic Prophylaxis Used LMWH: Enoxaparin(Lovenox) by  Gretel Gray R.N.                             Long Term Goals:   At discharge patient will be able to function safely at home and in the community with support.    Section completed by:  Brooklyn Bustamante R.N.
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
## 2019-12-17 NOTE — CARE PLAN
Problem: Mobility  Goal: STG-Within one week, patient will ambulate community distances  Description  1) Individualized goal: gait belt, SBA, 200 feet, c/s collar, cueing  2) Interventions: PT Group Therapy, PT Gait Training, PT Therapeutic Exercises, PT TENS Application, PT Neuro Re-Ed/Balance, PT Aquatic Therapy, PT Therapeutic Activity and PT Manual Therapy     Outcome: MET  Goal: STG-Within one week, patient will ascend and descend four to six stairs  Description  1) Individualized goal: gait belt, SPV, 1-2 railings, c/s collar  2) Interventions: PT Group Therapy, PT Gait Training, PT Therapeutic Exercises, PT TENS Application, PT Neuro Re-Ed/Balance, PT Aquatic Therapy, PT Therapeutic Activity and PT Manual Therapy       Outcome: MET     Problem: Mobility Transfers  Goal: STG-Within one week, patient will perform bed mobility  Description  1) Individualized goal: Sit to supine with SPV, c/s collar, cueing  2) Interventions: PT Group Therapy, PT Gait Training, PT Therapeutic Exercises, PT TENS Application, PT Neuro Re-Ed/Balance, PT Aquatic Therapy, PT Therapeutic Activity and PT Manual Therapy       Outcome: MET  Goal: STG-Within one week, patient will sit to stand  Description  1) Individualized goal: gait belt, SBA, c/s collar  2) Interventions: PT Group Therapy, PT Gait Training, PT Therapeutic Exercises, PT TENS Application, PT Neuro Re-Ed/Balance, PT Aquatic Therapy, PT Therapeutic Activity and PT Manual Therapy     Outcome: MET  Goal: STG-Within one week, patient will transfer bed to chair  Description  1) Individualized goal: c/s collar, SPV level, cueing  2) Interventions: PT Group Therapy, PT Gait Training, PT Therapeutic Exercises, PT TENS Application, PT Neuro Re-Ed/Balance, PT Aquatic Therapy, PT Therapeutic Activity and PT Manual Therapy        Outcome: MET

## 2019-12-17 NOTE — CARE PLAN
Problem: Pain Management  Goal: Pain level will decrease to patient's comfort goal  Outcome: PROGRESSING AS EXPECTED  Note:   Tylenol given at bedtime for c/o back pain with adequate relief. Pt sleeps good with requested trazodone. Will continue to monitor.     Problem: Bowel/Gastric:  Goal: Normal bowel function is maintained or improved  Outcome: PROGRESSING AS EXPECTED  Intervention: Educate patient and significant other/support system about diet, fluid intake, medications and activity to promote bowel function  Note:   Pt continent of bowel. She reports a regular daily bowel movement. LBM 12/16/19.

## 2019-12-17 NOTE — THERAPY
Occupational Therapy  Daily Treatment     Patient Name: Olga Lockhart  Age:  69 y.o., Sex:  female  Medical Record #: [id number]  Today's Date: 2019     Precautions  Precautions: Spinal / Back Precautions , Cervical Collar    Comments: C-collar on at all times, Mod I indoors trial    Safety   ADL Safety : Requires Supervision for Safety, Requires Cueing for Safety    Subjective       Objective       19 1401   Precautions   Precautions Spinal / Back Precautions ;Cervical Collar     Comments C-collar on at all times, Mod I indoors trial   Vitals   O2 Delivery None (Room Air)   Pain   Intervention Declines   Pain 0 - 10 Group   Therapist Pain Assessment 0   Non Verbal Descriptors   Non Verbal Scale  Calm   Cognition    Level of Consciousness Alert   Standing Upper Body Exercises   Other Exercises Standing at FluidoBike, Level 3, 20 mins, rest break x 3, 2.785km   IADL Treatments   Kitchen Mobility Education See Below   Meal Preparation Mod Indep for kitchen mobility accessing items from upper/lower cabinets and fridge.  Mod I for simple meal prep w/ stove top (eggs) + clean up post cooking activity (washing dishes putting items away, clean/clearing counter tops).   Interdisciplinary Plan of Care Collaboration   Patient Position at End of Therapy Edge of Bed;Call Light within Reach;Tray Table within Reach;Phone within Reach   OT Total Time Spent   OT Individual Total Time Spent (Mins) 60   OT Charge Group   OT Therapy Activity 2   OT Therapeutic Exercise  2       FIM Toiletin - Modified Independent  Toileting Description:  Increased time    FIM Toilet Transfer Score:  6 - Modified Independent  Toilet Transfer Description:  Grab bar, Increased time      Assessment    Pt was alert and cooperative w/ tx.  Limiters include generalized weakness, limited endurance and spinal/cervical precautions.  Pt at mod I (orange wrist band).  Pt to DC .    Plan    ADL/IADLs , related mobility  ,  strength/endurance building  Incorporating C spine precautions, bilateral FMC  actiivty

## 2019-12-17 NOTE — REHAB-PT IDT TEAM NOTE
"Physical Therapy   Mobility  Bed mobility:  Mod I, c/s collar, increased time  Bed /Chair/Wheelchair Transfer Initial:  1 - Total Assistance  Bed /Chair/Wheelchair Transfer Current:  6 - Modified Independent   Bed/Chair/Wheelchair Transfer Description:  Increased time  Walk Initial:  2 - Max Assistance  Walk Current:  5 - Standby Prompting/Supervision or Set-up   Walk Description:  Supervision for safety(1529 ft and room <> gym, spv )  Wheelchair Initial:  1 - Total Assistance  Wheelchair Current:  1 - Total Assistance   Wheelchair Description:  (40ft using bilateral UE/LE; fatigues quickly; requires constant cues for w/c skills)  Stairs Initial:  4 - Minimal Assistance  Stairs Current: 5 - Standby Prompting/Supervision or Set-up   Stairs Description: Hand rails, Supervision for safety(12 4\" steps with single HR and distant spv )  Patient/Family Training/Education:  Ongoing patient education; family training not required  DME/DC Recommendations:  Intermittent distant SPV; no AD; Outpatient PT as transportation allows  Strengths:  Able to follow instructions, Adequate strength, Alert and oriented, Effective communication skills, Good endurance, Independent PLOF, Making steady progress towards goals, Manages pain appropriately, Motivated for self care and independence, Pleasant and cooperative and Willingly participates in therapeutic activities  Barriers:   Other: Cervical collar restricts AROM, Patient transportation  # of short term goals set= 5  # of short term goals met= 5; proceeding to work on LTG progress  Physical Therapy Problems     Problem: PT-Long Term Goals     Dates: Start: 12/06/19       Description:     Goal: LTG-By discharge, patient will ambulate     Dates: Start: 12/06/19       Description: 1) Individualized goal:  500 feet, c/s collar, SPC prn, Mod I level  2) Interventions:  PT Group Therapy, PT Gait Training, PT Therapeutic Exercises, PT TENS Application, PT Neuro Re-Ed/Balance, PT Aquatic " Therapy, PT Therapeutic Activity and PT Manual Therapy             Goal: LTG-By discharge, patient will transfer one surface to another     Dates: Start: 12/06/19       Description: 1) Individualized goal:  c/s julián, Mod I level  2) Interventions:  PT Group Therapy, PT Gait Training, PT Therapeutic Exercises, PT TENS Application, PT Neuro Re-Ed/Balance, PT Aquatic Therapy, PT Therapeutic Activity and PT Manual Therapy               Goal: LTG-By discharge, patient will ambulate up/down 4-6 stairs     Dates: Start: 12/06/19       Description: 1) Individualized goal:  c/s collar, R vania, Mod I level  2) Interventions:  PT Group Therapy, PT Gait Training, PT Therapeutic Exercises, PT TENS Application, PT Neuro Re-Ed/Balance, PT Aquatic Therapy, PT Therapeutic Activity and PT Manual Therapy               Goal: LTG-By discharge, patient will transfer in/out of a car     Dates: Start: 12/06/19       Description: 1) Individualized goal: c/s julián, Mod I level  2) Interventions: PT Group Therapy, PT Gait Training, PT Therapeutic Exercises, PT TENS Application, PT Neuro Re-Ed/Balance, PT Aquatic Therapy, PT Therapeutic Activity and PT Manual Therapy                           Section completed by:  Todd Preciado, PT

## 2019-12-17 NOTE — THERAPY
Physical Therapy   Daily Treatment     Patient Name: Olga Lockhart  Age:  69 y.o., Sex:  female  Medical Record #: [id number]  Today's Date: 12/17/2019     Precautions  Precautions: Spinal / Back Precautions , Cervical Collar    Comments: C-collar on at all times, Mod I indoors trial    Subjective    Patient agreeable to PT.     Objective       12/17/19 1231   Precautions   Precautions Spinal / Back Precautions ;Cervical Collar     Comments C-collar on at all times, Mod I indoors trial   Vitals   O2 (LPM) 0   O2 Delivery None (Room Air)   Bed Mobility    Supine to Sit Modified Independent   Sit to Supine Modified Independent   Sit to Stand Modified Independent   Scooting Modified Independent   Rolling Modified Independent   IDT Conference   IDT Conference I have reviewed and discussed the POC and barriers to discharge for this patient.  I am knowledgeable of the patient's needs and have attended the IDT Conference.   Interdisciplinary Plan of Care Collaboration   IDT Collaboration with    (IDT team)   Collaboration Comments IDT conference: d/c to pt's ex-brother-in-law's home in [place] on Thursday 12/19/19; family training set up by SLP; no AD; reviewed cervical precautions; Home health PT/OT/SLP initially; variable cognition over past 2 days, possibly medication changes, but good PT session today   PT Total Time Spent   PT Individual Total Time Spent (Mins) 60   PT Charge Group   PT Gait Training 3   PT Therapeutic Activities 1     Reviewed pt's report from c/s spine imaging, current cervical collar usage and c/s precautions, reviewed safety with ambulation, initiating Mod I trial due to indoors ambulation status, SPV-Mod I outdoors ambulation without SPC or FWW, reviewed pt's goals and anxiety with return to driving in future.    FIM Bed/Chair/Wheelchair Transfers Score: 6 - Modified Independent  Bed/Chair/Wheelchair Transfers Description:  (c/s collar, elevated HOB, increased time)    FIM Walking Score:  5 -  Standby Prompting/Supervision or Set-up  Walking Description:  (SPV for way finding prn and over grass surface, but otherwise Mod I indoors/outdoors today for 2 reps x15 min each, mild SOB as fatigue increased, no LOB noted.  Will trial Mod I indoors (c/s collar but no other AD).)    FIM Wheelchair Score:    Wheelchair Description:       FIM Stairs Score:  5 - Standby Prompting/Supervision or Set-up  Stairs Description:  (SPV once but Mod I otherwise, 1 set of 16 standard stairs, c/s collar, R railing only, reciprocal pattern)      Assessment    Patient's balance continues to improve; good safety awareness; improving endurance with onset of fatigue and mild SOB after stairs or 15 minutes of ambulation; fair to good c/s adherence.    Plan    D/c FIMs, IRF-Johnie, in-home safety & Fall Facts handout.  Family training not required but is scheduled.  D/C on Thursday 12/19/19.

## 2019-12-17 NOTE — REHAB-ACTIVITY IDT TEAM NOTE
ACT   Recreation/Community     Leisure Competence Measure  Leisure Awareness: Independent  Leisure Attitude: Independent  Leisure Skills: Minimal Assist  Cultural / Social Behaviors: Independent  Interpersonal Skills: Independent  Community Integration Skills: Modified Independent  Social Contact: Independent  Community Participation: Independent    Strengths:  Motivated for self care and independence, Pleasant and cooperative and Willingly participates in therapeutic activities  Barriers:  Confused and Poor carryover of learning  # of short term goals set=2  # of short term goals met=1    Has been working on cognitive leisure and has poor carryover of learning. Has worked on the same problems two days in a row and did not remember working with this writer the prior day as well as could not repeat solving the cognitive activity without min verbal cues.     Recreation Therapy Problems     Problem: Recreation Therapy     Dates: Start: 12/10/19       Description:     Goal: STG-Within one week, patient will actively engage in planning of community re-integration outing     Dates: Start: 12/10/19       Description:           Goal: LTG-By discharge, patient will participate in a community re-integration outing     Dates: Start: 12/10/19       Description:                       Section completed by:  ALCON VincentS
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
## 2019-12-17 NOTE — THERAPY
Physical Therapy   Daily Treatment     Patient Name: Olga Lockhart  Age:  69 y.o., Sex:  female  Medical Record #: [id number]  Today's Date: 12/17/2019     Precautions  Precautions: Spinal / Back Precautions ;Cervical Collar  ;Fall Risk  Comments: safety, balance    Subjective    Late entry from electronic documentation down, see paper documentation for same note as this one.    Patient agreeable to PT.     Objective       12/13/19 1001   Precautions   Precautions Spinal / Back Precautions ;Cervical Collar  ;Fall Risk   Comments safety, balance   Vitals   O2 (LPM) 0   O2 Delivery None (Room Air)   Non Verbal Descriptors   Non Verbal Scale  Calm   Bed Mobility    Sit to Stand Supervised   Neuro-Muscular Treatments   Comments CGA for 2x 250' with Ball Bouncing.  x15 min.  Fair safety awareness.  Fair activity tolerance.   Interdisciplinary Plan of Care Collaboration   Collaboration Comments Due to our electronic downtime today, the PT daily treatment note can be found uploaded under the Media tab.   PT Total Time Spent   PT Individual Total Time Spent (Mins) 75   PT Charge Group   PT Gait Training 3   PT Neuromuscular Re-Education / Balance 1   PT Therapeutic Activities 1     Focus on increased gait and balance training per flowsheet. Repeated cueing for safety and call light usage. Improving activty tolerance. Unable to schedule family training today secondary to electronic systems down. Discussed pt's reported missing boots (from [facility]) with CM.    FIM Walking Score:  5 - Standby Prompting/Supervision or Set-up  Walking Description:  (SBA, c/s collar, gait belt, 3x 300-400 ft, mild bradykinesia, decreased step length B, SBA with 180 degree turns. x45 min.)    FIM Toilet Transfer Score:  5 - Standby Prompting/Supervision or Set-up  Toilet Transfer Description:  (SPV, grab bar)      Assessment    Patient is still mildly impulsive but has improving activity tolerance and gait quality.    Plan    Continue to work on  balance, stairs, gait, and transfers.

## 2019-12-17 NOTE — CARE PLAN
Problem: Safety  Goal: Will remain free from injury  Outcome: PROGRESSING AS EXPECTED   Patient demonstrates good safety technique this shift.  Asks for assistance when needed and does not attempt self transfer.  Able to verbalize needs.  Will continue to monitor.   Problem: Infection  Goal: Will remain free from infection  Outcome: PROGRESSING AS EXPECTED   Patient remains free of infection as evidenced by normal vital signs and breath sounds. Will continue monitoring.   Problem: Pain Management  Goal: Pain level will decrease to patient's comfort goal  Outcome: PROGRESSING AS EXPECTED   Patient able to verbalize pain level and verbalize an acceptable level of pain.

## 2019-12-17 NOTE — REHAB-SLP IDT TEAM NOTE
Speech Therapy   Cognitive Linquistic Functions  Comprehension Initial:  7 - Independent  Comprehension Current:  7 - Independent   Comprehension Description:     Expression Initial:  7 - Independent  Expression Current:  7 - Independent   Expression Description:     Social Interaction Initial:  6 - Modified Independent  Social Interaction Current:  6 - Modified Independent   Social Interaction Description:  Medication  Problem Solving Initial:  6 - Modified Independent  Problem Solving Current:  6 - Modified Independent   Problem Solving Description:  Verbal cueing, Increased time  Memory Initial:  3 - Moderate Assistance  Memory Current:  5 - Standby Prompting/Supervision or Set-up   Memory Description:  Verbal cueing, Therapy schedule  Executive Functioning / Organization Initial:   NA  Executive Functioning / Organization Current:   Spv-Min A   Executive Functioning Desciption:  Increased time   Swallowing  Swallowing Status:  Not following for dysphagia management   Orders Placed This Encounter   Procedures   • Diet Order Diabetic     Standing Status:   Standing     Number of Occurrences:   1     Order Specific Question:   Diet:     Answer:   Diabetic [3]     Behavior Modification Plan  Analyze tasks (break down into smaller steps)  Assistive Technology  Memory aides: and Low tech: Calendar, planner, schedule, alarms/timers, pill organizer, post-it notes, lists  Family Training/Education:  Scheduled on 12/18 from 3-4:30  DC Recommendations:   Pt would benefit from outpatient ST   Strengths:  Able to follow instructions, Alert and oriented, Effective communication skills, Good insight into deficits/needs, Independent PLOF, Making steady progress towards goals, Motivated for self care and independence, Pleasant and cooperative, Supportive family and Willingly participates in therapeutic activities  Barriers:  Other: Complex attention and organization, STM   # of short term goals set=3  # of short term goals  met=3  Speech Therapy Problems     Problem: Problem Solving STGs     Dates: Start: 12/17/19       Description:     Goal: STG-Within one week, patient will     Dates: Start: 12/17/19       Description: 1) Individualized goal:  Complete complex attention/organization tasks with spv to achieve 90% accuracy.    2) Interventions:  SLP Speech Language Treatment, SLP Self Care / ADL Training , SLP Cognitive Skill Development and SLP Group Treatment                   Problem: Speech/Swallowing LTGs     Dates: Start: 12/06/19       Description:     Goal: LTG-By discharge, patient will solve complex problem     Dates: Start: 12/06/19       Description: 1) Individualized goal:  With modified independence for a safe discharge home   2) Interventions:  SLP Speech Language Treatment, SLP Self Care / ADL Training , SLP Cognitive Skill Development and SLP Group Treatment                          Section completed by:  Almas Fleming MS,CCC-SLP

## 2019-12-17 NOTE — CARE PLAN
Problem: Problem Solving STGs  Goal: STG-Within one week, patient will  Description  1) Individualized goal:  Complete a medication sorting task with modified independence to achieve 100% accuracy in 2/2 opportunities.    2) Interventions:  SLP Speech Language Treatment, SLP Self Care / ADL Training , SLP Cognitive Skill Development and SLP Group Treatment     Outcome: MET  Note:   Pt is able to complete medication sorting tasks mod I      Problem: Memory STGs  Goal: STG-Within one week, patient will  Description  1) Individualized goal:  Write daily therapy notes in her memory notebook with min cues required in 3/3 opportunities.    2) Interventions:  SLP Speech Language Treatment, SLP Self Care / ADL Training , SLP Cognitive Skill Development and SLP Group Treatment     Outcome: MET  Note:   Pt able to recall daily therapy sessions without the use of a memory notebook   Goal: STG-Within one week, patient will  Description  1) Individualized goal:  Recall concussion/mild TBI education given min A with 80% accuracy.    2) Interventions:  SLP Speech Language Treatment, SLP Self Care / ADL Training , SLP Cognitive Skill Development and SLP Group Treatment     Outcome: MET  Note:   Pt able to recall TBI education

## 2019-12-17 NOTE — THERAPY
Speech Language Pathology  Daily Treatment     Patient Name: Olga Lockhart  Age:  69 y.o., Sex:  female  Medical Record #: [id number]  Today's Date: 12/17/2019     Subjective    Pt was pleasant and cooperative during this ST session, reported that she was much more alert than yesterday because she took her muscle relaxant last night instead of this morning.       Objective       12/17/19 1001   SLP Total Time Spent   SLP Individual Total Time Spent (Mins) 60   Charge Group   SLP Cognitive Skill Development 4       Assessment    Pt completed the balance calculating task from yesterday and was able to solve the totals of the credits and debits independently with 100% accuracy with extra time required to complete this task.  Discussed the importance of organization as pt became confused several times during this task by numbers she had written down but wasn't sure what they were for.     Plan    Continue targeting complex attention tasks

## 2019-12-17 NOTE — REHAB-CM IDT TEAM NOTE
Case Management    DC Planning  DC destination/dispostion:  Home w/ family to [place]. Need to verify address & home status.  Referrals: HH vs OP therapies.  DC Needs: DME for patient safety & mobility. Family training. HH vs OP referral. MD f/u appts.  Barriers to discharge: Functional mobility deficits. Cognitive deficits. Lives alone in [facility] w/ limited services avail.    Strengths: Motivation. Supportive family. PLOF independent.    Section completed by:  Cristine Roldan R.N.

## 2019-12-18 PROCEDURE — 770010 HCHG ROOM/CARE - REHAB SEMI PRIVAT*

## 2019-12-18 PROCEDURE — 700102 HCHG RX REV CODE 250 W/ 637 OVERRIDE(OP): Performed by: PHYSICAL MEDICINE & REHABILITATION

## 2019-12-18 PROCEDURE — 97116 GAIT TRAINING THERAPY: CPT

## 2019-12-18 PROCEDURE — 99232 SBSQ HOSP IP/OBS MODERATE 35: CPT | Performed by: PHYSICAL MEDICINE & REHABILITATION

## 2019-12-18 PROCEDURE — A9270 NON-COVERED ITEM OR SERVICE: HCPCS | Performed by: HOSPITALIST

## 2019-12-18 PROCEDURE — 700102 HCHG RX REV CODE 250 W/ 637 OVERRIDE(OP): Performed by: HOSPITALIST

## 2019-12-18 PROCEDURE — A9270 NON-COVERED ITEM OR SERVICE: HCPCS | Performed by: PHYSICAL MEDICINE & REHABILITATION

## 2019-12-18 PROCEDURE — 99232 SBSQ HOSP IP/OBS MODERATE 35: CPT | Performed by: HOSPITALIST

## 2019-12-18 PROCEDURE — 700111 HCHG RX REV CODE 636 W/ 250 OVERRIDE (IP): Performed by: HOSPITALIST

## 2019-12-18 PROCEDURE — 97530 THERAPEUTIC ACTIVITIES: CPT

## 2019-12-18 PROCEDURE — 97535 SELF CARE MNGMENT TRAINING: CPT

## 2019-12-18 PROCEDURE — G0515 COGNITIVE SKILLS DEVELOPMENT: HCPCS

## 2019-12-18 PROCEDURE — 700101 HCHG RX REV CODE 250: Performed by: PHYSICAL MEDICINE & REHABILITATION

## 2019-12-18 RX ADMIN — CYCLOBENZAPRINE HYDROCHLORIDE 5 MG: 10 TABLET, FILM COATED ORAL at 21:33

## 2019-12-18 RX ADMIN — METFORMIN HYDROCHLORIDE 500 MG: 500 TABLET ORAL at 09:16

## 2019-12-18 RX ADMIN — LIDOCAINE 2 PATCH: 50 PATCH TOPICAL at 09:19

## 2019-12-18 RX ADMIN — FLUOXETINE HYDROCHLORIDE 10 MG: 20 TABLET, FILM COATED ORAL at 09:15

## 2019-12-18 RX ADMIN — TRAMADOL HYDROCHLORIDE 50 MG: 50 TABLET, COATED ORAL at 21:33

## 2019-12-18 RX ADMIN — LISINOPRIL 10 MG: 5 TABLET ORAL at 09:15

## 2019-12-18 RX ADMIN — ATORVASTATIN CALCIUM 20 MG: 10 TABLET, FILM COATED ORAL at 09:19

## 2019-12-18 RX ADMIN — TRAZODONE HYDROCHLORIDE 50 MG: 50 TABLET ORAL at 21:38

## 2019-12-18 RX ADMIN — ACETAMINOPHEN 650 MG: 325 TABLET ORAL at 16:06

## 2019-12-18 RX ADMIN — METFORMIN HYDROCHLORIDE 500 MG: 500 TABLET ORAL at 18:13

## 2019-12-18 RX ADMIN — TBO-FILGRASTIM 480 MCG: 480 INJECTION, SOLUTION SUBCUTANEOUS at 15:36

## 2019-12-18 ASSESSMENT — ENCOUNTER SYMPTOMS
FEVER: 0
HEADACHES: 0
NAUSEA: 0
HALLUCINATIONS: 0
SHORTNESS OF BREATH: 0
VOMITING: 0
PALPITATIONS: 0
BLURRED VISION: 0
DIZZINESS: 0

## 2019-12-18 ASSESSMENT — PATIENT HEALTH QUESTIONNAIRE - PHQ9
SUM OF ALL RESPONSES TO PHQ9 QUESTIONS 1 AND 2: 0
2. FEELING DOWN, DEPRESSED, IRRITABLE, OR HOPELESS: NOT AT ALL
1. LITTLE INTEREST OR PLEASURE IN DOING THINGS: NOT AT ALL

## 2019-12-18 NOTE — PROGRESS NOTES
Report received from previous shift. Patient lying quietly in bed, no complaints offered, denies pain. Patient was cooperative with assessment and medication administration. Will continue to monitor.

## 2019-12-18 NOTE — THERAPY
Physical Therapy   Daily Treatment     Patient Name: Olga Lockhart  Age:  69 y.o., Sex:  female  Medical Record #: [id number]  Today's Date: 12/18/2019     Precautions  Precautions: Spinal / Back Precautions , Cervical Collar    Comments: C-collar on at all times, Mod I indoors    Subjective    Patient agreeable to PT; reports is ready for d/c tomorrow; reports that her appt today is cancelled.     Objective       12/18/19 0901   Precautions   Precautions Spinal / Back Precautions ;Cervical Collar     Comments C-collar on at all times, Mod I indoors   Vitals   O2 (LPM) 0   O2 Delivery None (Room Air)   Bed Mobility    Supine to Sit Modified Independent   Sit to Supine Modified Independent   Sit to Stand Modified Independent   Scooting Modified Independent   Rolling Modified Independent   Interdisciplinary Plan of Care Collaboration   Collaboration Comments roomboard updated   PT Total Time Spent   PT Individual Total Time Spent (Mins) 60   PT Charge Group   PT Gait Training 2   PT Therapeutic Activities 2     Discussed POC including f/u therapies, d/c process, CLOF, strengths/barriers, c/s precautions, Fall Facts handout, demonstration of Fall recovery at Mod I level at EOM, and completed mobility FIMs and IRF-Johnie for d/c.  With pt and charge RN: discussed pt's cancelled appt.    FIM Bed/Chair/Wheelchair Transfers Score: 6 - Modified Independent  Bed/Chair/Wheelchair Transfers Description:  (c/s collar. 2 reps.)    FIM Walking Score:  5 - Standby Prompting/Supervision or Set-up  Walking Description:  (SPV-Mod I outdoors and community due to environmental scanning while donning c/s collar, Mod I indoors. c/s collar.  Focus on environmental scanning during walking with conversation.  2 reps over 1000 feet without fatigue.)    FIM Wheelchair Score:  0 - Not tested,unsafe activity  Wheelchair Description:       FIM Stairs Score:  6 - Modified Independent  Stairs Description:  (Mod I for 1 set of 20 standard stairs,  reciprocal pattern, uses 1-2 railings and c/s collar)    FIM Toiletin - Modified Independent  Toileting Description:  Increased time(c/s collar)    FIM Toilet Transfer Score:  6 - Modified Independent  Toilet Transfer Description:  (c/s collar)      Assessment    Patient is ready for next level of care; limitations include high level balance, outdoor environmental scanning.    Plan    D/c tomorrow to brother-in-law's home with intermittent distant SPV, c/s collar but no other mobility AD, Home health PT/OT/SLP due to lack of outpatient SLP services in [place].

## 2019-12-18 NOTE — THERAPY
"Speech Language Pathology  Daily Treatment     Patient Name: Olga Lockhart  Age:  69 y.o., Sex:  female  Medical Record #: [id number]  Today's Date: 12/18/2019     Subjective    Pt was pleasant and cooperative during this ST session      Objective       12/18/19 1401   SLP Total Time Spent   SLP Individual Total Time Spent (Mins) 30   Charge Group   SLP Cognitive Skill Development 2       Assessment    Pt participated in high level problem solving and attention tasks using the \"Flex your brain\" cards.  Pt required occasional cues for problem solving (min-spv) and extra time for processing, but was able to complete all tasks with 100% accuracy.      Plan    Continue targeting high level attention and problem solving, family training to be completed tomorrow     "
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
## 2019-12-18 NOTE — DISCHARGE PLANNING
Per Todd at [facility], referral declined.     referral sent to [facility] .  Awaiting response.

## 2019-12-18 NOTE — THERAPY
"Occupational Therapy  Daily Treatment     Patient Name: Olga Lockhart  Age:  69 y.o., Sex:  female  Medical Record #: [id number]  Today's Date: 2019     Precautions  Precautions: Spinal / Back Precautions , Cervical Collar    Comments: C-collar on at all times    Safety   ADL Safety : Modified Independent  Bathroom Safety: Modified Independent    Subjective  \"Good morning!  I'm looking forward to a hot shower\"     Objective       19 0701   Precautions   Precautions Spinal / Back Precautions ;Cervical Collar     Comments C-collar on at all times   Safety    ADL Safety  Modified Independent   Bathroom Safety Modified Independent   Vitals   O2 Delivery None (Room Air)   Pain   Intervention Declines   Pain 0 - 10 Group   Therapist Pain Assessment 0   Non Verbal Descriptors   Non Verbal Scale  Calm   Cognition    Level of Consciousness Alert   Interdisciplinary Plan of Care Collaboration   Patient Position at End of Therapy Edge of Bed;Call Light within Reach;Tray Table within Reach;Phone within Reach   OT Total Time Spent   OT Individual Total Time Spent (Mins) 60   OT Charge Group   OT Self Care / ADL 4       FIM Grooming Score:  6 - Modified Independent  Grooming Description:  Increased time(Mod I standing at sink side for groom/hygiene/oral/hair care.)    FIM Bathing Score:  6 - Modified Independent  Bathing Description:       FIM Upper Body Dressin - Modified Independent  Upper Body Dressing Description:  Increased time(Mod I for UB clothing management + cervical collar management)    FIM Lower Body Dressing Score:  7 - Independent  Lower Body Dressing Description:  (Indep for LB clothing management)    FIM Toiletin - Independent  Toileting Description:       FIM Toilet Transfer Score:  7 - Independent  Toilet Transfer Description:       FIM Tub/Shower Transfers Score:  7 - Independent  Tub/Shower Transfers Description:         Assessment    Pt was alert and cooperative w/ tx.  Pt at Mod I to " Indep for all BADL's and functional mobility w/out device.  Mod Indep to manage cervical collar.    Plan    Pt to DC tomorrow morning to friend/family's home in [place] and receive HH services.

## 2019-12-18 NOTE — CARE PLAN
Problem: PT-Long Term Goals  Goal: LTG-By discharge, patient will ambulate  Description  1) Individualized goal:  500 feet, c/s collar, SPC prn, Mod I level  2) Interventions:  PT Group Therapy, PT Gait Training, PT Therapeutic Exercises, PT TENS Application, PT Neuro Re-Ed/Balance, PT Aquatic Therapy, PT Therapeutic Activity and PT Manual Therapy     Outcome: MET  Goal: LTG-By discharge, patient will transfer one surface to another  Description  1) Individualized goal:  c/s collar, Mod I level  2) Interventions:  PT Group Therapy, PT Gait Training, PT Therapeutic Exercises, PT TENS Application, PT Neuro Re-Ed/Balance, PT Aquatic Therapy, PT Therapeutic Activity and PT Manual Therapy       Outcome: MET  Goal: LTG-By discharge, patient will ambulate up/down 4-6 stairs  Description  1) Individualized goal:  c/s collar, R railing, Mod I level  2) Interventions:  PT Group Therapy, PT Gait Training, PT Therapeutic Exercises, PT TENS Application, PT Neuro Re-Ed/Balance, PT Aquatic Therapy, PT Therapeutic Activity and PT Manual Therapy       Outcome: MET  Goal: LTG-By discharge, patient will transfer in/out of a car  Description  1) Individualized goal: c/s collar, Mod I level  2) Interventions: PT Group Therapy, PT Gait Training, PT Therapeutic Exercises, PT TENS Application, PT Neuro Re-Ed/Balance, PT Aquatic Therapy, PT Therapeutic Activity and PT Manual Therapy     Outcome: MET

## 2019-12-18 NOTE — DISCHARGE PLANNING
Met w/ patient after IDT conference to review team recommendations & on track for DC 12.19.19. Updated NSGY office canceled appt for Wednesday & rescheduled for 1.9.2020 post discharge. Patient will wear cervical collar until cleared by NSGY. Reviewed recommendation for HH: RN, PT/OT/SLP for ongoing therapies post acute. Patient will be staying w/ in-laws (TRISH & VICTORINA) in [place] until able to return home alone to [place]. Patient unable to stay w/ son & DIL in [place] at this time. Reviewed HH choice form. Requests referral to [facility]. No DME needed. Family training set up per therapies. Patient disappointed in not being able to return home alone, but understands need for care assistance. Concerned about her animals (2 dogs & 3 cats) at home. Neighbor has been taking care of them. Verbalizes agreement & understanding of HH referral for ongoing therapy & progress toward goals.    Discharge address for brother in law [place]: [address]. [place].

## 2019-12-18 NOTE — DISCHARGE PLANNING
CASE MANAGEMENT DISCHARGE INSTRUCTIONS  For  Olga Lockhart                         On  Thursday, December 19th, 2019       Discharge Location Home with Home Health temporarily to family's home at   [address] [place] [address]. in [facility]       Agency Name / Phone [facility] - Phone [phone]       Home Health Services Registered Nurse; Occupational Therapist; Physical Therapist; Speech Therapist        Medical Equipment Ordered No medical equipment ordered, patient has 4 Wheeled Walker and Single Point Cane already.       Comments Patient to stay with [facility]  -  Phone [phone]                Follow-up With  Details  Why  Contact Info            DARYL RossiBLolisS.  On 1/7/2020 Tuesday, check in at 1:45pm. We will fax records to office.  [address] [place] [id number] [phone]            Marty Peterson M.D.  On 1/9/2020 Thursday, check in at 12:00 noon for 12:30 pm appointment. Office will contact you for x-rays prior to appointment.  [address] [place] [address] [phone]            [facility]-On[facility]/Hematology    Referral to Hematology for pancytopenia in process per Dr. Morrison's order, records have been faxed.  [address] [facility]  [place] [address]  Phone [phone]  Fax [phone]

## 2019-12-18 NOTE — CARE PLAN
Problem: Communication  Goal: The ability to communicate needs accurately and effectively will improve  Outcome: PROGRESSING AS EXPECTED     Problem: Safety  Goal: Will remain free from injury  Outcome: PROGRESSING AS EXPECTED  Goal: Will remain free from falls  Outcome: PROGRESSING AS EXPECTED     Problem: Infection  Goal: Will remain free from infection  Outcome: PROGRESSING AS EXPECTED     Problem: Venous Thromboembolism (VTW)/Deep Vein Thrombosis (DVT) Prevention:  Goal: Patient will participate in Venous Thrombosis (VTE)/Deep Vein Thrombosis (DVT)Prevention Measures  Outcome: PROGRESSING AS EXPECTED     Problem: Bowel/Gastric:  Goal: Normal bowel function is maintained or improved  Outcome: PROGRESSING AS EXPECTED  Goal: Will not experience complications related to bowel motility  Outcome: PROGRESSING AS EXPECTED     Problem: Knowledge Deficit  Goal: Knowledge of disease process/condition, treatment plan, diagnostic tests, and medications will improve  Outcome: PROGRESSING AS EXPECTED  Goal: Knowledge of the prescribed therapeutic regimen will improve  Outcome: PROGRESSING AS EXPECTED     Problem: Discharge Barriers/Planning  Goal: Patient's continuum of care needs will be met  Outcome: PROGRESSING AS EXPECTED     Problem: Pain Management  Goal: Pain level will decrease to patient's comfort goal  Outcome: PROGRESSING AS EXPECTED     Problem: Skin Integrity  Goal: Risk for impaired skin integrity will decrease  Outcome: PROGRESSING AS EXPECTED     Problem: Metabolic:  Goal: Ability to maintain appropriate glucose levels will improve  Outcome: PROGRESSING AS EXPECTED

## 2019-12-19 VITALS
OXYGEN SATURATION: 94 % | RESPIRATION RATE: 18 BRPM | DIASTOLIC BLOOD PRESSURE: 70 MMHG | TEMPERATURE: 97.9 F | HEART RATE: 82 BPM | BODY MASS INDEX: 31.41 KG/M2 | WEIGHT: 184 LBS | SYSTOLIC BLOOD PRESSURE: 124 MMHG | HEIGHT: 64 IN

## 2019-12-19 PROBLEM — D61.818 PANCYTOPENIA (HCC): Status: RESOLVED | Noted: 2019-12-06 | Resolved: 2019-12-19

## 2019-12-19 PROBLEM — S01.01XA SCALP LACERATION, INITIAL ENCOUNTER: Status: RESOLVED | Noted: 2019-12-03 | Resolved: 2019-12-19

## 2019-12-19 PROBLEM — G47.01 INSOMNIA DUE TO MEDICAL CONDITION: Status: ACTIVE | Noted: 2019-12-19

## 2019-12-19 LAB
ANISOCYTOSIS BLD QL SMEAR: ABNORMAL
BASOPHILS # BLD AUTO: 0 % (ref 0–1.8)
BASOPHILS # BLD: 0 K/UL (ref 0–0.12)
DACRYOCYTES BLD QL SMEAR: NORMAL
EOSINOPHIL # BLD AUTO: 0 K/UL (ref 0–0.51)
EOSINOPHIL NFR BLD: 0 % (ref 0–6.9)
ERYTHROCYTE [DISTWIDTH] IN BLOOD BY AUTOMATED COUNT: 56.4 FL (ref 35.9–50)
HCT VFR BLD AUTO: 30.5 % (ref 37–47)
HGB BLD-MCNC: 9.7 G/DL (ref 12–16)
LYMPHOCYTES # BLD AUTO: 1.11 K/UL (ref 1–4.8)
LYMPHOCYTES NFR BLD: 9.6 % (ref 22–41)
MANUAL DIFF BLD: NORMAL
MCH RBC QN AUTO: 29.9 PG (ref 27–33)
MCHC RBC AUTO-ENTMCNC: 31.8 G/DL (ref 33.6–35)
MCV RBC AUTO: 94.1 FL (ref 81.4–97.8)
MICROCYTES BLD QL SMEAR: ABNORMAL
MONOCYTES # BLD AUTO: 0 K/UL (ref 0–0.85)
MONOCYTES NFR BLD AUTO: 0 % (ref 0–13.4)
MORPHOLOGY BLD-IMP: NORMAL
NEUTROPHILS # BLD AUTO: 10.49 K/UL (ref 2–7.15)
NEUTROPHILS NFR BLD: 86.1 % (ref 44–72)
NEUTS BAND NFR BLD MANUAL: 4.3 % (ref 0–10)
NRBC # BLD AUTO: 0 K/UL
NRBC BLD-RTO: 0 /100 WBC
OVALOCYTES BLD QL SMEAR: NORMAL
PLATELET # BLD AUTO: 83 K/UL (ref 164–446)
PLATELET BLD QL SMEAR: NORMAL
PMV BLD AUTO: 10 FL (ref 9–12.9)
POIKILOCYTOSIS BLD QL SMEAR: NORMAL
RBC # BLD AUTO: 3.24 M/UL (ref 4.2–5.4)
RBC BLD AUTO: PRESENT
WBC # BLD AUTO: 11.6 K/UL (ref 4.8–10.8)

## 2019-12-19 PROCEDURE — 99239 HOSP IP/OBS DSCHRG MGMT >30: CPT | Performed by: PHYSICAL MEDICINE & REHABILITATION

## 2019-12-19 PROCEDURE — 700102 HCHG RX REV CODE 250 W/ 637 OVERRIDE(OP): Performed by: PHYSICAL MEDICINE & REHABILITATION

## 2019-12-19 PROCEDURE — A9270 NON-COVERED ITEM OR SERVICE: HCPCS | Performed by: HOSPITALIST

## 2019-12-19 PROCEDURE — 700102 HCHG RX REV CODE 250 W/ 637 OVERRIDE(OP): Performed by: HOSPITALIST

## 2019-12-19 PROCEDURE — 97530 THERAPEUTIC ACTIVITIES: CPT

## 2019-12-19 PROCEDURE — A9270 NON-COVERED ITEM OR SERVICE: HCPCS | Performed by: PHYSICAL MEDICINE & REHABILITATION

## 2019-12-19 PROCEDURE — G0515 COGNITIVE SKILLS DEVELOPMENT: HCPCS

## 2019-12-19 PROCEDURE — 99232 SBSQ HOSP IP/OBS MODERATE 35: CPT | Performed by: HOSPITALIST

## 2019-12-19 PROCEDURE — 700101 HCHG RX REV CODE 250: Performed by: PHYSICAL MEDICINE & REHABILITATION

## 2019-12-19 PROCEDURE — 36415 COLL VENOUS BLD VENIPUNCTURE: CPT

## 2019-12-19 PROCEDURE — 97116 GAIT TRAINING THERAPY: CPT

## 2019-12-19 PROCEDURE — 85027 COMPLETE CBC AUTOMATED: CPT

## 2019-12-19 PROCEDURE — 85007 BL SMEAR W/DIFF WBC COUNT: CPT

## 2019-12-19 RX ORDER — LIDOCAINE 50 MG/G
2 PATCH TOPICAL EVERY 24 HOURS
Qty: 30 PATCH | Refills: 3 | Status: SHIPPED | OUTPATIENT
Start: 2019-12-19

## 2019-12-19 RX ORDER — CYCLOBENZAPRINE HCL 5 MG
5 TABLET ORAL 3 TIMES DAILY PRN
Qty: 30 TAB | Refills: 0 | Status: SHIPPED | OUTPATIENT
Start: 2019-12-19

## 2019-12-19 RX ORDER — TRAMADOL HYDROCHLORIDE 50 MG/1
50 TABLET ORAL EVERY 4 HOURS PRN
Qty: 14 TAB | Refills: 0 | Status: SHIPPED | OUTPATIENT
Start: 2019-12-19 | End: 2019-12-26

## 2019-12-19 RX ORDER — FLUOXETINE 20 MG/1
10 TABLET, FILM COATED ORAL DAILY
Qty: 30 TAB | Refills: 3 | Status: SHIPPED | OUTPATIENT
Start: 2019-12-19

## 2019-12-19 RX ORDER — LISINOPRIL 10 MG/1
10 TABLET ORAL DAILY
Qty: 30 TAB | Refills: 3 | Status: SHIPPED | OUTPATIENT
Start: 2019-12-19

## 2019-12-19 RX ORDER — TRAZODONE HYDROCHLORIDE 50 MG/1
50 TABLET ORAL
Qty: 30 TAB | Refills: 3 | Status: SHIPPED | OUTPATIENT
Start: 2019-12-19

## 2019-12-19 RX ADMIN — ACETAMINOPHEN 650 MG: 325 TABLET ORAL at 12:32

## 2019-12-19 RX ADMIN — LISINOPRIL 10 MG: 5 TABLET ORAL at 08:39

## 2019-12-19 RX ADMIN — ATORVASTATIN CALCIUM 20 MG: 10 TABLET, FILM COATED ORAL at 08:39

## 2019-12-19 RX ADMIN — METFORMIN HYDROCHLORIDE 500 MG: 500 TABLET ORAL at 08:38

## 2019-12-19 RX ADMIN — FLUOXETINE HYDROCHLORIDE 10 MG: 20 TABLET, FILM COATED ORAL at 08:38

## 2019-12-19 RX ADMIN — LIDOCAINE 2 PATCH: 50 PATCH TOPICAL at 08:38

## 2019-12-19 ASSESSMENT — PATIENT HEALTH QUESTIONNAIRE - PHQ9
SUM OF ALL RESPONSES TO PHQ9 QUESTIONS 1 AND 2: 0
1. LITTLE INTEREST OR PLEASURE IN DOING THINGS: NOT AT ALL
2. FEELING DOWN, DEPRESSED, IRRITABLE, OR HOPELESS: NOT AT ALL

## 2019-12-19 ASSESSMENT — ACTIVITIES OF DAILY LIVING (ADL)
TOILET_TRANSFER_LEVEL_OF_ASSIST: ABLE TO COMPLETE TOILET TRANSFER WITHOUT ASSIST
SHOWER_TRANSFER_LEVEL_OF_ASSIST: ABLE TO COMPLETE SHOWER TRANSFER WITHOUT ASSIST
TOILETING_LEVEL_OF_ASSIST: ABLE TO COMPLETE TOILETING WITHOUT ASSIST

## 2019-12-19 ASSESSMENT — ENCOUNTER SYMPTOMS
BLURRED VISION: 0
DIARRHEA: 0
DIZZINESS: 0
FEVER: 0
COUGH: 0
NERVOUS/ANXIOUS: 0

## 2019-12-19 NOTE — THERAPY
Speech Language Pathology  Daily Treatment     Patient Name: Olga Lockhart  Age:  69 y.o., Sex:  female  Medical Record #: [id number]  Today's Date: 12/19/2019     Subjective    Family training completed with pt, pt's sister and brother-in-law      Objective       12/19/19 1331   SLP Total Time Spent   SLP Individual Total Time Spent (Mins) 15   Charge Group   SLP Cognitive Skill Development 1       Assessment    Reviewed basic TBI education including memory, attention, medication management, financial management and driving.  Pt was able to verbalize all strategies for attention and memory independently and her family reported that they would be able to assist pt will all medication, financial and driving tasks if needed.      Plan    Pt discharging home with family today.

## 2019-12-19 NOTE — PROGRESS NOTES
PATIENT DISCHARGE MEDICATION COUNSELING:  This patient received individualized medication counseling regarding the current regimen they are receiving in this facility. Potential adverse effects, monitoring parameters, and proper administration were covered in preparation for their discharge. The patient asked relevant questions regarding the medications for which answers were provided. Our pharmacy hours and phone number were provided for follow up questions should they arise.  Diabetic agents: Blood sugar monitoring was discussed as well as the signs and symptoms of hypoglycemia as the patient is currently on (Diabetic agent).  Antihypertensives: This patient is being treated for hypertension and it is recommended that they monitor and record with a blood pressure device. The patient has been instructed to contact their primary care physician if the HR or blood pressure is abnormal.   Pain medications: The patient will likely be taking pain medications upon discharge. The patient was warned regarding sedation, impairment, and constipation as side effects to such regimens. Further warning regarding operating motor vehicles, or dangerous equipment was delivered as well.    George Damian PharmD

## 2019-12-19 NOTE — THERAPY
Speech Language Pathology  Daily Treatment     Patient Name: Olga Lockhart  Age:  69 y.o., Sex:  female  Medical Record #: [id number]  Today's Date: 12/18/2019     Subjective    Patient pleasant and cooperative.     Objective       12/18/19 1501   SCCAN (Scales of Cognitive and Communicative Ability for Neurorehabilitation)   Oral Expression - Raw Score 18   Oral Expression - Scale Performance Score 95   Orientation - Raw Score 12   Orientation - Scale Performance Score 100   Memory - Raw Score 19   Memory - Scale Performance Score 100   Speech Comprehension - Raw Score 13   Speech Comprehension - Scale Performance Score 100   Reading Comprehension - Raw Score 11   Reading Comprehension - Scale Performance Score 92   Writing - Raw Score 7   Writing - Scale Performance Score 100   Attention - Raw Score 15   Attention - Scale Performance Score 94   Problem Solving - Raw Score 22   Problem Solving - Scale Performance Score 96   SCCAN Total Raw Score 92   SCCAN Degree of Severity Typical Functioning   SLP Total Time Spent   SLP Individual Total Time Spent (Mins) 30   Charge Group   SLP Cognitive Skill Development 2           Assessment    Patient participated in administration of out comes measures with SCCAN readministered.  Patient achieved a total raw score of 92 which is characteristic of typical functioning.  This represents and improvement from initiatial evaluation from a score of 81 ( mild).   Percentage scores improved on problem solving from 91 to 96,  attention from 88 to 94, and memory from 42 to 100.     Plan    Patient is planning to discharge home with son tomorrow.

## 2019-12-19 NOTE — THERAPY
Occupational Therapy  Daily Treatment     Patient Name: Olga Lockhart  Age:  69 y.o., Sex:  female  Medical Record #: [id number]  Today's Date: 12/19/2019     Precautions  Precautions: Spinal / Back Precautions , Cervical Collar    Comments: C-collar on at all times, Mod I indoors    Safety   ADL Safety : Independent, Modified Independent  Bathroom Safety: Independent, Modified Independent    Subjective       Objective       12/19/19 1301   Precautions   Precautions Spinal / Back Precautions ;Cervical Collar     Comments C-collar on at all times, Mod I indoors   Safety    ADL Safety  Independent;Modified Independent   Bathroom Safety Independent;Modified Independent   Vitals   O2 Delivery None (Room Air)   Pain   Intervention Declines   Pain 0 - 10 Group   Therapist Pain Assessment 0   Non Verbal Descriptors   Non Verbal Scale  Calm   Cognition    Level of Consciousness Alert   Interdisciplinary Plan of Care Collaboration   IDT Collaboration with  Family / Caregiver   Patient Position at End of Therapy Seated;Family / Friend in Room;Phone within Reach;Tray Table within Reach;Call Light within Reach   Collaboration Comments Family training - Reviewed ADL's with family - Pt at Mod Indep to Indep for BADL's and IADLs (simple meal prep w/ mircowave /stove, laundry taska and light house keeping).  Pt not cleared to drive.  Review of spinal/cervical precautions + wear/care cervical collar.  Pt Mod I fo cervical collar management.  Recommended shower chair for shower activity.  Mod I to Indep for commode, shower, tub/shower transfers.  Functional mobility Mod I w/out device.  Reviewed simple cog, memory and safety/environmental awareness.  Pt to recieve HH.   OT Total Time Spent   OT Individual Total Time Spent (Mins) 30   OT Charge Group   OT Therapy Activity 2       Assessment    Family education, review of BADL's/IADL's/bathroom transfers.  Recommended shower chair.  See notes above for family training.    Plan    DC to  brother-in-laws home in [place].

## 2019-12-19 NOTE — DISCHARGE INSTRUCTIONS
[facility] NURSING DISCHARGE INSTRUCTIONS    Blood Pressure : 124/70  Weight: 85.1 kg (187 lb 9.8 oz)  Nursing recommendations for Olga Lockhart at time of discharge are as follows:  Client verbalized understanding of all discharge instructions and prescriptions.     Review all your home medications and newly ordered medications with your doctor and/or pharmacist. Follow medication instructions as directed by your doctor and/or pharmacist.    Pain Management:   Discharge Pain Medication Instructions:  Comfort Goal: Comfort at Rest, Comfort with Movement, Perform Activity  Intervention: Medication (see MAR), Repositioned  Notify your primary care provider if pain is unrelieved with these measures, if the pain is new, or increased in intensity.    Discharge Skin Characteristics: Warm, Dry  Discharge Skin Exam: (healing head and forehead)  Wound 12/04/19 Laceration Head (Active)   Site Assessment Clean;Dry;Intact 12/19/2019  7:05 AM   Hlaey-wound Assessment Intact 12/19/2019  7:05 AM   Margins Attached edges 12/19/2019  7:05 AM   Closure Open to air 12/19/2019  7:05 AM   Drainage Amount None 12/19/2019  7:05 AM   Treatments Site care 12/10/2019  7:45 AM   Dressing Options Open to Air 12/19/2019  7:05 AM   NEXT Weekly Photo (Inpatient Only) 12/14/19 12/9/2019  7:40 AM   Sutures Removed Intact Yes 12/12/2019  3:52 PM   Number of Sutures Removed 7 12/12/2019  3:52 PM     Skin / Wound Care Instructions: Please contact your primary care physician for any change in skin integrity.    If You Have Surgical Incisions / Wounds:  Monitor surgical site(s) for signs of increased swelling, redness or symptoms of drainage from the site or fever as this could indicate signs and symptoms of infection. If these symptoms are noted, notifiy your primary care provider.      Discharge Safety Instructions: Should Not Be Left Alone In The House     Discharge Safety Concerns: Weakness  The interdisciplinary team has  made recommendation that you should have adult supervision in the house due to history of falls and weakness  Anti-embolic stockings are not required to increase circulation to the lower extremities.    Discharge Diet: ADA     Discharge Liquids: Thin  Discharge Bowel Function: Continent  Please contact your primary care physician for any changes in bowel habits.  Discharge Bowel Program:    Discharge Bladder Function: Continent  Discharge Urinary Devices: None      Nursing Discharge Plan:   Influenza Vaccine Indication: Not indicated: Previously immunized this influenza season and > 8 years of age  Influenza Vaccine Given - only chart on this line when given: Influenza Vaccine Given (See MAR)    Case Management Discharge Instructions:   Discharge Location: Home with Home Health  Agency Name/Address/Phone: [facility]  -  Phone [phone]  Home Health: Registered Nurse, Occupational Therapist, Physical Therapist, Speech Therapist  Outpatient Services:    DME Provider/Phone:    Medical Equipment Ordered:    Prescription Faxed to:        Discharge Medication Instructions:  Below are the medications your physician expects you to take upon discharge:Suicidal Feelings: How to Help Yourself    Suicide is the taking of one's own life. If you feel as though life is getting too tough to handle and are thinking about suicide, get help right away. To get help:  · Call your local emergency services (911 in the [place]  · Call a suicide hotline to speak with a trained counselor who understands how you are feeling. The following is a list of suicide hotlines in the [place]. For a list of hotlines in [place], visit www.suicide.org/hotlines/international/iolurl-leaedhj-dnwsvigt.html.  ¨ 3-099-146-TALK (1-404.951.4730).  ¨ 9-416-GFPOOMX (1-229.508.1535).  ¨ 1-256.918.5726. This is a hotline for Turkish speakers.  ¨ 6-802-473-4TTY (1-316.525.7815). This is a hotline for TTY users.  ¨ 5-774-6-U-ALONZO (1-788.861.3807). This  is a hotline for lesbian, syed, bisexual, transgender, or questioning youth.  · Contact a crisis center or a local suicide prevention center. To find a crisis center or suicide prevention center:  ¨ Call your local hospital, clinic, community service organization, mental health center, social service provider, or health department. Ask for assistance in connecting to a crisis center.  ¨ Visit www.suicidepreventionlifeline.org/getinvolved/ for a list of crisis centers in the [place], or visit www.suicideprevention.ca/glewofxk-yqyul-jptiwgs/find-a-crisis-centre for a list of centers in [place].  · Visit the following websites:  ¨ National Suicide Prevention Lifeline: www.suicidepreventionTalkdeskline.org  ¨ Hopeline: www.Just around Us.Border Stylo  ¨ American Foundation for Suicide Prevention: www.afsp.org  ¨ The Jonah Project (for lesbian, syed, bisexual, transgender, or questioning youth): www.thetrevorproject.org  How can I help myself feel better?  · Promise yourself that you will not do anything drastic when you have suicidal feelings. Remember, there is hope. Many people have gotten through suicidal thoughts and feelings, and you will, too. You may have gotten through them before, and this proves that you can get through them again.  · Let family, friends, teachers, or counselors know how you are feeling. Try not to isolate yourself from those who care about you. Remember, they will want to help you. Talk with someone every day, even if you do not feel sociable. Face-to-face conversation is best.  · Call a mental health professional and see one regularly.  · Visit your primary health care provider every year.  · Eat a well-balanced diet, and space your meals so you eat regularly.  · Get plenty of rest.  · Avoid alcohol and drugs, and remove them from your home. They will only make you feel worse.  · If you are thinking of taking a lot of medicine, give your medicine to someone who can give it to you one day at a time.  If you are on antidepressants and are concerned you will overdose, let your health care provider know so he or she can give you safer medicines. Ask your mental health professional about the possible side effects of any medicines you are taking.  · Remove weapons, poisons, knives, and anything else that could harm you from your home.  · Try to stick to routines. Follow a schedule every day. Put self-care on your schedule.  · Make a list of realistic goals, and cross them off when you achieve them. Accomplishments give a sense of worth.  · Wait until you are feeling better before doing the things you find difficult or unpleasant.  · Exercise if you are able. You will feel better if you exercise for even a half hour each day.  · Go out in the sun or into nature. This will help you recover from depression faster. If you have a favorite place to walk, go there.  · Do the things that have always given you pleasure. Play your favorite music, read a good book, paint a picture, play your favorite instrument, or do anything else that takes your mind off your depression if it is safe to do.  · Keep your living space well lit.  · When you are feeling well, write yourself a letter about tips and support that you can read when you are not feeling well.  · Remember that life’s difficulties can be sorted out with help. Conditions can be treated. You can work on thoughts and strategies that serve you well.  This information is not intended to replace advice given to you by your health care provider. Make sure you discuss any questions you have with your health care provider.  Document Released: 06/23/2004 Document Revised: 08/16/2017 Document Reviewed: 04/14/2015  Elsevier Interactive Patient Education © 2017 Elsevier Inc.    Fall Prevention in the Home    Introduction  Falls can cause injuries. They can happen to people of all ages. There are many things you can do to make your home safe and to help prevent falls.  What can I do  on the outside of my home?  · Regularly fix the edges of walkways and driveways and fix any cracks.  · Remove anything that might make you trip as you walk through a door, such as a raised step or threshold.  · Trim any bushes or trees on the path to your home.  · Use bright outdoor lighting.  · Clear any walking paths of anything that might make someone trip, such as rocks or tools.  · Regularly check to see if handrails are loose or broken. Make sure that both sides of any steps have handrails.  · Any raised decks and porches should have guardrails on the edges.  · Have any leaves, snow, or ice cleared regularly.  · Use sand or salt on walking paths during winter.  · Clean up any spills in your garage right away. This includes oil or grease spills.  What can I do in the bathroom?  · Use night lights.  · Install grab bars by the toilet and in the tub and shower. Do not use towel bars as grab bars.  · Use non-skid mats or decals in the tub or shower.  · If you need to sit down in the shower, use a plastic, non-slip stool.  · Keep the floor dry. Clean up any water that spills on the floor as soon as it happens.  · Remove soap buildup in the tub or shower regularly.  · Attach bath mats securely with double-sided non-slip rug tape.  · Do not have throw rugs and other things on the floor that can make you trip.  What can I do in the bedroom?  · Use night lights.  · Make sure that you have a light by your bed that is easy to reach.  · Do not use any sheets or blankets that are too big for your bed. They should not hang down onto the floor.  · Have a firm chair that has side arms. You can use this for support while you get dressed.  · Do not have throw rugs and other things on the floor that can make you trip.  What can I do in the kitchen?  · Clean up any spills right away.  · Avoid walking on wet floors.  · Keep items that you use a lot in easy-to-reach places.  · If you need to reach something above you, use a strong  step stool that has a grab bar.  · Keep electrical cords out of the way.  · Do not use floor polish or wax that makes floors slippery. If you must use wax, use non-skid floor wax.  · Do not have throw rugs and other things on the floor that can make you trip.  What can I do with my stairs?  · Do not leave any items on the stairs.  · Make sure that there are handrails on both sides of the stairs and use them. Fix handrails that are broken or loose. Make sure that handrails are as long as the stairways.  · Check any carpeting to make sure that it is firmly attached to the stairs. Fix any carpet that is loose or worn.  · Avoid having throw rugs at the top or bottom of the stairs. If you do have throw rugs, attach them to the floor with carpet tape.  · Make sure that you have a light switch at the top of the stairs and the bottom of the stairs. If you do not have them, ask someone to add them for you.  What else can I do to help prevent falls?  · Wear shoes that:  ¨ Do not have high heels.  ¨ Have rubber bottoms.  ¨ Are comfortable and fit you well.  ¨ Are closed at the toe. Do not wear sandals.  · If you use a stepladder:  ¨ Make sure that it is fully opened. Do not climb a closed stepladder.  ¨ Make sure that both sides of the stepladder are locked into place.  ¨ Ask someone to hold it for you, if possible.  · Clearly alona and make sure that you can see:  ¨ Any grab bars or handrails.  ¨ First and last steps.  ¨ Where the edge of each step is.  · Use tools that help you move around (mobility aids) if they are needed. These include:  ¨ Canes.  ¨ Walkers.  ¨ Scooters.  ¨ Crutches.  · Turn on the lights when you go into a dark area. Replace any light bulbs as soon as they burn out.  · Set up your furniture so you have a clear path. Avoid moving your furniture around.  · If any of your floors are uneven, fix them.  · If there are any pets around you, be aware of where they are.  · Review your medicines with your doctor.  Some medicines can make you feel dizzy. This can increase your chance of falling.  Ask your doctor what other things that you can do to help prevent falls.  This information is not intended to replace advice given to you by your health care provider. Make sure you discuss any questions you have with your health care provider.  Document Released: 10/14/2010 Document Revised: 05/25/2017 Document Reviewed: 01/22/2016  © 2017 [employer]  Diabetes, Keeping Your Heart and Blood Vessels Healthy  Too much glucose (sugar) in the blood for a long period of time can cause problems for those who have diabetes. This high blood glucose can damage many parts of the body, such as the heart, blood vessels, eyes, and kidneys. Heart and blood vessel disease can lead to heart attacks and strokes, which is the leading cause of death for people with diabetes. There are many things you can to do slow down or prevent the complications from diabetes.  WHAT DO MY HEART AND BLOOD VESSELS DO?  Your heart and blood vessels make up your circulatory system. Your heart is a big muscle that pumps blood through your body. Your heart pumps blood carrying oxygen to large blood vessels (arteries) and small blood vessels (capillaries). Other blood vessels, called veins, carry blood back to the heart.  HOW DO MY BLOOD VESSELS GET CLOGGED?  Several things, including having diabetes, can make your blood cholesterol level too high. Cholesterol is a substance that is made by the body and used for many important functions. It is also found in some food that comes from animals. When cholesterol is too high, the insides of large blood vessels become narrowed, even clogged. Narrowed and clogged blood vessels make it harder for enough blood to get to all parts of your body. This problem is called atherosclerosis.  WHAT CAN HAPPEN WHEN BLOOD VESSELS ARE CLOGGED?  When arteries become narrowed and clogged, you may have heart problems and are at increased risk for  heart attack and stroke:  · Chest pain (angina) causes pain in your chest, arms, shoulders, or back. You may feel the pain more when your heart beats faster, such as when you exercise. The pain may go away when you rest. You also may feel very weak and sweaty. If you do not get treatment, chest pain may happen more often. If diabetes has damaged the heart nerves, you may not feel the chest pain.   · Heart attack. A heart attack happens when a blood vessel in or near the heart becomes blocked. Not enough blood can get to that part of the heart muscle so the area becomes oxygen deprived and the heart muscle may be permanently damaged.   WHAT ARE THE WARNING SIGNS OF A HEART ATTACK?  You may have one or more of the following warning signs:  · Chest pain or discomfort.   · Pain or discomfort in your arms, back, jaw, or neck.   · Indigestion or stomach pain.   · Shortness of breath.   · Sweating.   · Nausea or vomiting.   · Light-headedness.   · You may have no warning signs at all, or they may come and go.   HOW DOES HEART DISEASE CAUSE HIGH BLOOD PRESSURE?  Narrowed blood vessels leave a smaller opening for blood to flow through. It is like turning on a garden hose and holding your thumb over the opening. The smaller opening makes the water shoot out with more pressure. In the same way, narrowed blood vessels lead to high blood pressure. Other factors, such as kidney problems and being overweight, can also lead to high blood pressure.  Many people with diabetes also have high blood pressure. If you have heart, eye, or kidney problems from diabetes, high blood pressure can make them worse.  If you have high blood pressure, ask your caregiver how to lower it. Your caregiver may be asked to take blood pressure medicine every day. Some types of blood pressure medicine can also help keep your kidneys healthy.  To lower your blood pressure, your may be asked to lose weight; eat more fruits and vegetables; eat less salt and  "high-sodium foods, such as canned soups, luncheon meats, salty snack foods, and fast foods; and drink less alcohol.  WHAT ARE THE WARNING SIGNS OF A STROKE?  A stroke happens when part of your brain is not getting enough blood and stops working. Depending on the part of the brain that is damaged, a stroke can cause:  · Sudden weakness or numbness of your face, arm, or leg on one side of your body.   · Sudden confusion, trouble talking, or trouble understanding.   · Sudden dizziness, loss of balance, or trouble walking.   · Sudden trouble seeing in one or both eyes or sudden double vision.   · Sudden severe headache.   Sometimes, one or more of these warning signs may happen and then disappear. You might be having a \"mini-stroke,\" also called a TIA (transient ischemic attack). If you have any of these warning signs, tell your caregiver right away.  HOW CAN CLOGGED BLOOD VESSELS HURT MY LEGS AND FEET?  Peripheral vascular disease can happen when the openings in your blood vessels become narrow and not enough blood gets to your legs and feet. You may feel pain in your buttocks, the back of your legs, or your thighs when you stand, walk, or exercise. Sometimes, surgery is necessary to treat this problem.  WHAT CAN I DO TO KEEP MY BLOOD VESSELS HEALTHY AND PREVENT HEART DISEASE AND STROKE?  · Keep your blood glucose under control. An A1c blood test will probably be ordered by your caregiver at least twice a year. The A1c test tells you your average blood glucose for the past 2 to 3 months and can give valuable information on the overall control of your diabetes.   · Keep your blood pressure under control. Have it checked at every health care visit. If you are on medication to control blood pressure, take it exactly as prescribed. The target for most people is below 130/80.   · Keep your cholesterol under control. Have it checked at least once a year. The targets for most people are:   · LDL (bad) cholesterol: below 100 " "mg/dl.   · HDL (good) cholesterol: above 40 mg/dl in men and above 50 mg/dl in women.   · Triglycerides (another type of fat in the blood): below 150 mg/dl.   · Make physical activity a part of your daily routine. Aim for at least 30 minutes of exercise most days of the week. Check with your caregiver to learn what activities are best for you.   · Make sure that the foods you eat are \"heart-healthy.\" Include foods high in fiber, such as oat bran, oatmeal, whole-grain breads and cereals, fruits, and vegetables. Cut back on foods high in saturated fat or cholesterol, such as meats, butter, dairy products with fat, eggs, shortening, lard, and foods with palm oil or coconut oil.   · Maintain a healthy weight. If you are overweight, try to exercise most days of the week. See a registered dietitian for help in planning meals and lowering the fat and calorie content.   · If you smoke, QUIT. Your caregiver can tell you about ways to help you quit smoking.   · Ask your caregiver whether you should take an aspirin every day. Studies have shown that taking a low dose of aspirin every day can help reduce your risk of heart disease and stroke.   · Take your medicines as directed.   SEEK MEDICAL CARE IF:   · You have any of the warning signs of a heart attack or stroke.   · If you have pain in your legs or feet when walking.   · If your feet and legs are cool or cold to the touch.   FOR MORE INFORMATION   · Diabetes educators (nurses, dietitians, pharmacists, and other health professionals).   · To find a diabetes educator near you, call the American Association of Diabetes Educators (AADE) toll-free at 7-653-XOMFGX8 (1-929.502.3573), or look on the Internet at www.diabeteseducator.org and click on \"Find a Diabetes Educator.\"   · Dietitians.   · To find a dietitian near you, call the American Dietetic Association toll-free at 1-375.509.9777, or look on the Internet at www.eatright.org and click on \"Find a Nutrition Professional.\" "   · Government.   · The National Heart, Lung, and Blood Marshall (NHLBI) is part of the National Institutes of Health. To learn more about heart and blood vessel problems, write or call NHLBI Information Center, [address] [address], [place] [address], (870) [phone]; or see www.nhlbi.nih.gov on the Internet.   · To get more information about taking care of diabetes, contact:   National Diabetes Information Clearinghouse  [address] [place] [address]  Phone: 1-645.206.3743 or [phone]  Fax: [phone]  Email: ndic@info.niddk.nih.gov  Internet: www.diabetes.niddk.nih.gov  National Diabetes Education Program  [address] [place] [address]  Phone: 1-352.198.4762  Fax: [phone]  Internet: http://[employer] [facility].nih.gov  American Diabetes Association  [address] [place] [address]  Phone: 1-151.574.8131  Internet: www.diabetes.org  Juvenile Diabetes Research Foundation Int'l  [address] [place] [address]  Phone: 1-260.717.9048  Internet: www.jdrf.org  Document Released: 12/20/2004 Document Revised: 03/11/2013 Document Reviewed: 05/28/2010  ExitCare® Patient Information ©2013 [employer].  Hypertension  Hypertension is another name for high blood pressure. High blood pressure forces your heart to work harder to pump blood. A blood pressure reading has two numbers, which includes a higher number over a lower number (example: 110/72).  Follow these instructions at home:  · Have your blood pressure rechecked by your doctor.  · Only take medicine as told by your doctor. Follow the directions carefully. The medicine does not work as well if you skip doses. Skipping doses also puts you at risk for problems.  · Do not smoke.  · Monitor your blood pressure at home as told by your doctor.  Contact a doctor if:  · You think you are having a reaction to the medicine you are taking.  · You have repeat headaches or feel dizzy.  · You have puffiness (swelling) in your  ankles.  · You have trouble with your vision.  Get help right away if:  · You get a very bad headache and are confused.  · You feel weak, numb, or faint.  · You get chest or belly (abdominal) pain.  · You throw up (vomit).  · You cannot breathe very well.  This information is not intended to replace advice given to you by your health care provider. Make sure you discuss any questions you have with your health care provider.  Document Released: 06/05/2009 Document Revised: 05/25/2017 Document Reviewed: 10/10/2014  [employer] Interactive Patient Education © 2017 [employer].    Cervical Spine Fracture, Stable  A cervical spine fracture is a break or crack in one of the bones of the neck. If there is a very low risk of problems happening during healing, the fracture is considered stable.  What are the causes?  This condition may be caused by:  · Motor vehicle accidents.  · Injuries from sports such as diving, football, biking, wrestling, or skiing.  · Severe osteoporosis or other bone diseases, such as cancers that spread to bone or metabolic abnormalities that cause bone weakness.  What are the signs or symptoms?  Symptoms of this condition include:  · Severe neck pain after an accident or fall. Pain may spread down the shoulders or arms.  · Bruising or swelling on the back of the neck.  · Numbness, tingling, sudden muscle tightening (spasms), or weakness in the arms, legs, or both.  How is this diagnosed?  This condition may be diagnosed based on:  · Your medical history.  · A physical exam of your neck, arms, and legs.  · Imaging studies of the neck, such as:  ¨ X-rays.  ¨ CT scan.  ¨ MRI.  How is this treated?  This condition is treated with a neck brace or cervical collar to keep your neck from moving during the healing process. A cervical collar is a device that supports your chin and the back of your head. You may also be given medicine to help relieve pain.  Follow these instructions at home:  If you have a neck  brace:  · Wear the brace as told by your health care provider. Remove it only as told by your health care provider.  · If you experience numbness or tingling, loosen the brace.  · Keep the brace clean.  · If the brace is not waterproof:  ¨ Do not let it get wet.  ¨ Cover it with a watertight covering when you take a bath or a shower.  If you have a cervical collar:  · Do not remove the collar unless your health care provider tells you to do this. If you are allowed to remove the collar for cleaning and bathing:  ¨ Follow your health care provider’s instructions about how to safely take off the collar.  ¨ Wash and thoroughly dry the skin on your neck. Check your skin for irritation or sores. If you see any, tell your health care provider.  · Ask your health care provider before making any adjustments to your collar. Small adjustments may be needed over time to improve comfort and reduce pressure on your chin or on the back of your head.  · Keep long hair outside of the collar.  · Keep your collar clean by wiping it with mild soap and water and letting it air-dry completely. The pads can be hand-washed with soap and water and air-dried completely.  Managing pain, stiffness, and swelling  · If directed, put ice on the injured area:  ¨ If you have a removable brace or cervical collar, remove it as told by your health care provider.  ¨ Put ice in a plastic bag.  ¨ Place a towel between your skin and the bag.  ¨ Leave the ice on for 20 minutes, 2-3 times a day.  Activity  · Do not drive a car until your health care provider approves.  · Do not drive or use heavy machinery while taking prescription pain medicine.  · Avoid physical activity for as long as directed. Ask your health care provider what activities are safe for you.  General instructions  · Take over-the-counter and prescription medicines only as told by your health care provider.  · Do not take baths, swim, or use a hot tub until your health care provider  approves. Ask your health care provider if you can take showers. You may only be allowed to take sponge baths for bathing.  · Do not use any products that contain nicotine or tobacco, such as cigarettes and e-cigarettes. These can delay bone healing. If you need help quitting, ask your health care provider.  · Keep all follow-up visits as told by your health care provider. This is important to help prevent long-term (chronic) or permanent injury, pain, and disability. You may need to have follow-up X-rays or MRI 1-3 weeks after your injury.  Contact a health care provider if:  · You have irritation or sores on your skin from your brace or cervical collar.  Get help right away if:  · You have neck pain that gets worse.  · You develop difficulties swallowing or breathing.  · You develop swelling in your neck.  · You have any of the following problems in your arms, legs, or both:  ¨ Numbness.  ¨ Weakness.  ¨ Burning pain.  ¨ Movement problems.  · You are unable to control when you urinate or have a bowel movement (incontinence).  · You have problems with coordination or difficulty walking.  This information is not intended to replace advice given to you by your health care provider. Make sure you discuss any questions you have with your health care provider.  Document Released: 11/04/2005 Document Revised: 09/21/2017 Document Reviewed: 09/21/2017  [employer] Interactive Patient Education © 2017 [employer]      Physical Therapy Discharge Instructions for Olga Lockhart    12/18/2019    Level of Assist Required for Ambulation: (Supervision when outdoors and/or when fatigued)  Distance Patient May Ambulate: community distances  Device Recommended for Ambulation: (neck collar only)  Level of Assist Required for Transfers: Requires No Assist  Device Recommended for Transfers: (neck collar only)  Home Exercise Program: None Issued(please focus on a safe transition home at this time)  Prosthesis / Orthosis Recommendation /  Location: (neck collar only)    Occupational Therapy Discharge Instructions for Olga Lockhart    12/19/2019    Level of Assist Required for Eating: Able to Complete Eating without Assist  Level of Assist Required for Grooming: Able to Complete Grooming without Assist  Level of Assist Required for Dressing: Able to Complete Dressing without Assist  Level of Assist Required for Toileting: Able to Complete Toileting without Assist  Level of Assist Required for Toilet Transfer: Able to Complete Toilet Transfer without Assist  Level of Assist Required for Bathing: Able to Complete Bathing without Assist  Equipment for Bathing: Shower Chair  Level of Assist Required for Shower Transfer: Able to Complete Shower Transfer without Assist  Level of Assist Required for Meal Prep: Able to Complete Meal Preparation without Assist  Driving: Please Contact Physician Prior to Driving  Home Exercise Program: Refer to Home Exercise Program Handout for Details    Olga,  It's been a pleasure working with you throughout your physical rehabilitation here at [facility].  Please continue to take your time, be aware of your environment and ask for help when necessary.  I'm recommending OT for home heal and use of a shower chair for bathing.  I wish you continued success in your physical recovery.    Sincerely,  Madi Wen, DAVIDR/L
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
## 2019-12-19 NOTE — PROGRESS NOTES
Patient discharged to home per order.  Discharge instructions reviewed with patient; she verbalizes understanding and signed copies placed in chart.  Patient has all belongings; signed copy of form in chart.  Patient left facility at 1424 via ambulating accompanied by rehab staff and brother-in-law.  Have enjoyed working with this pleasant patient.

## 2019-12-19 NOTE — DISCHARGE PLANNING
Case management Summary:   Met with patient prior to discharge.   Reviewed all follow up appointments.   [facility]to go to [facility] in [place]) and they are have accepted referral and are ready to follow.    During hospitalization, I have provided support and education and have been available for questions and information during hours of operation, communicated with therapy team and MD along with providing links/resources  to outside services.    Patient verbalizes agreement with all plans and has an understanding of the next steps within the post acute services.     Individualized Goals:   1. To be able to take care of myself  2. To drive myself  3. To live alone & independent  4. Verify home assessment & family support    Outcome:   1. Partial met- patient going home to live with brother in [place] until feeling better.   2. Not met- will be cleared by PCP.   3. Partial met- patient will be going home after a stay at [facility].   4. Met- patient going home with support of family.

## 2019-12-19 NOTE — PROGRESS NOTES
Patient care assumed. Report received from [facility] ASHISH Brandt. Patient is alert and calm, resting in bed. Call light and bedside table within reach. Will continue to monitor.

## 2019-12-20 NOTE — THERAPY
Physical Therapy   Daily Treatment     Patient Name: Olga Lockhart  Age:  69 y.o., Sex:  female  Medical Record #: [id number]  Today's Date: 12/19/2019     Precautions  Precautions: (P) Spinal / Back Precautions , Cervical Collar    Comments: (P) C-collar on at all times, Mod I indoors    Subjective    Patient agreeable to PT and family training.     Objective       12/19/19 1401   Precautions   Precautions Spinal / Back Precautions ;Cervical Collar     Comments C-collar on at all times, Mod I indoors   Vitals   O2 (LPM) 0   O2 Delivery None (Room Air)   Bed Mobility    Supine to Sit Modified Independent   Sit to Supine Modified Independent   Sit to Stand Modified Independent   Scooting Modified Independent   Rolling Modified Independent   Interdisciplinary Plan of Care Collaboration   IDT Collaboration with  Family / Caregiver   Collaboration Comments Performed many FIMs and IRF-Johnie at Mod I level so pt's brother-in-law and his SO can both see pt's CLOF; discussed POC, progress, and expectations; discussed being safe with curb steps with decreased UE support, especially if ice or snow present   PT Total Time Spent   PT Individual Total Time Spent (Mins) 30   PT Charge Group   PT Gait Training 1   PT Therapeutic Activities 1       FIM Bed/Chair/Wheelchair Transfers Score: 6 - Modified Independent  Bed/Chair/Wheelchair Transfers Description:  (c/s collar)    FIM Walking Score:  6 - Modified Independent  Walking Description:  (c/s collar, 2 reps indoors/outdoors up to 10 minutes, Mod I)    FIM Wheelchair Score:  0 - Not tested,unsafe activity  Wheelchair Description:       FIM Stairs Score:  6 - Modified Independent  Stairs Description:  Ascends/descends 12 to 14 steps(c/s collar, demonstrated Mod I with 1 and 2 railing apporaches)      Assessment    Patient is ready for next level of care; CNA and RN notified that patient is ready for d/c.    Plan    Pt to d/c after PT session completion.

## 2019-12-20 NOTE — THERAPY
Occupational Therapy  Daily Treatment     Patient Name: Olga Lockhart  Age:  69 y.o., Sex:  female  Medical Record #: [id number]  Today's Date: 2019     Precautions  Precautions: Spinal / Back Precautions , Cervical Collar    Comments: C-collar on at all times, Mod I indoors    Safety   ADL Safety : Independent, Modified Independent  Bathroom Safety: Independent, Modified Independent    Subjective       Objective       19 0831   Precautions   Precautions Spinal / Back Precautions ;Cervical Collar     Comments safety, balance   Vitals   O2 Delivery None (Room Air)   Non Verbal Descriptors   Non Verbal Scale  Calm   Interdisciplinary Plan of Care Collaboration   IDT Collaboration with  Certified Nursing Assistant   Patient Position at End of Therapy Seated;Self Releasing Lap Belt Applied;[place] Vest Applied;Call Light within Reach   Collaboration Comments Please refer to note of scanned documentation in media tab   OT Total Time Spent   OT Individual Total Time Spent (Mins) 60   OT Charge Group   OT Self Care / ADL 4       FIM Eating Score:  7 - Independent  Eating Description:       FIM Grooming Score:  4 -Min assist  Grooming Description:       FIM Bathing Score:  4 - Min assist  Bathing Description:       FIM Upper Body Dressin - Sup/SBA  Upper Body Dressing Description:       FIM Lower Body Dressing Score:  5 - Sup/SBAFIM Toiletin - Mod Independent  Toileting Description:       FIM Toilet Transfer Score:  6 - Mod I       FIM Tub/Shower Transfers Score:  4 - CGA  Tub/Shower Transfers Description:         Assessment    Pt was alert and cooperative w/ tx.  Refer to notes above    Plan    OT for strength, endurance, balance, safety awareness , AE/DME for safe ADL's and transfers.

## 2019-12-23 ENCOUNTER — TELEPHONE (OUTPATIENT)
Dept: HEMATOLOGY ONCOLOGY | Facility: MEDICAL CENTER | Age: 69
End: 2019-12-23

## 2019-12-23 NOTE — TELEPHONE ENCOUNTER
1st attempt to contact the patient.  Spoke with the patient to schedule a new patient hematology appointment. She stated she will call back with her calendar   NP/ Heidy/ Imelda/ Diego Morrison

## 2019-12-23 NOTE — PROGRESS NOTES
DATE OF SERVICE:  12/18/2019    The patient was seen for followup visit.  The patient will be discharged at   the end of this week.  She has done very well in her progress, she has made   very good gains.  Cognitively, she is alert and shows good concentration.  She   is making good decisions.  The patient is confident about being able to   return home and implement her home program long term.       ____________________________________     BINDU MERINO, PHD    SINTIA / SYBIL    DD:  12/22/2019 11:46:46  DT:  12/22/2019 20:15:21    D#:  [id number]  Job#:  [id number]

## 2020-01-08 ENCOUNTER — APPOINTMENT (OUTPATIENT)
Dept: URGENT CARE | Facility: PHYSICIAN GROUP | Age: 70
End: 2020-01-08
Payer: MEDICARE

## 2020-01-08 ENCOUNTER — APPOINTMENT (OUTPATIENT)
Dept: RADIOLOGY | Facility: IMAGING CENTER | Age: 70
End: 2020-01-08
Attending: NEUROLOGICAL SURGERY
Payer: MEDICARE

## 2020-01-08 DIAGNOSIS — S12.9XXS CLOSED FRACTURE OF CERVICAL VERTEBRA WITHOUT SPINAL CORD INJURY, UNSPECIFIED CERVICAL VERTEBRAL LEVEL, SEQUELA: ICD-10-CM

## 2020-01-08 PROCEDURE — 72040 X-RAY EXAM NECK SPINE 2-3 VW: CPT | Mod: TC,FY | Performed by: FAMILY MEDICINE

## 2020-01-09 ENCOUNTER — HOSPITAL ENCOUNTER (OUTPATIENT)
Dept: RADIOLOGY | Facility: MEDICAL CENTER | Age: 70
End: 2020-01-09
Attending: NEUROLOGICAL SURGERY
Payer: MEDICARE

## 2020-01-09 ENCOUNTER — APPOINTMENT (OUTPATIENT)
Dept: HEMATOLOGY ONCOLOGY | Facility: MEDICAL CENTER | Age: 70
End: 2020-01-09
Payer: MEDICARE

## 2020-01-09 DIAGNOSIS — S12.9XXA CLOSED FRACTURE OF CERVICAL VERTEBRA, UNSPECIFIED CERVICAL VERTEBRAL LEVEL, INITIAL ENCOUNTER (HCC): ICD-10-CM

## 2020-01-09 PROCEDURE — 72040 X-RAY EXAM NECK SPINE 2-3 VW: CPT

## 2020-02-05 ENCOUNTER — HOSPITAL ENCOUNTER (OUTPATIENT)
Dept: LAB | Facility: MEDICAL CENTER | Age: 70
End: 2020-02-05
Attending: INTERNAL MEDICINE
Payer: MEDICARE

## 2020-02-05 ENCOUNTER — OFFICE VISIT (OUTPATIENT)
Dept: HEMATOLOGY ONCOLOGY | Facility: MEDICAL CENTER | Age: 70
End: 2020-02-05
Payer: MEDICARE

## 2020-02-05 VITALS
HEIGHT: 65 IN | WEIGHT: 182.32 LBS | BODY MASS INDEX: 30.38 KG/M2 | RESPIRATION RATE: 16 BRPM | DIASTOLIC BLOOD PRESSURE: 76 MMHG | HEART RATE: 72 BPM | TEMPERATURE: 98 F | SYSTOLIC BLOOD PRESSURE: 124 MMHG | OXYGEN SATURATION: 99 %

## 2020-02-05 DIAGNOSIS — D61.818 PANCYTOPENIA (HCC): ICD-10-CM

## 2020-02-05 LAB
ALBUMIN SERPL BCP-MCNC: 4.6 G/DL (ref 3.2–4.9)
ALBUMIN/GLOB SERPL: 1.8 G/DL
ALP SERPL-CCNC: 78 U/L (ref 30–99)
ALT SERPL-CCNC: 26 U/L (ref 2–50)
ANION GAP SERPL CALC-SCNC: 8 MMOL/L (ref 0–11.9)
AST SERPL-CCNC: 21 U/L (ref 12–45)
BASOPHILS # BLD AUTO: 0.7 % (ref 0–1.8)
BASOPHILS # BLD: 0.03 K/UL (ref 0–0.12)
BILIRUB SERPL-MCNC: 1.2 MG/DL (ref 0.1–1.5)
BUN SERPL-MCNC: 25 MG/DL (ref 8–22)
CALCIUM SERPL-MCNC: 11 MG/DL (ref 8.5–10.5)
CHLORIDE SERPL-SCNC: 105 MMOL/L (ref 96–112)
CO2 SERPL-SCNC: 26 MMOL/L (ref 20–33)
CREAT SERPL-MCNC: 1 MG/DL (ref 0.5–1.4)
EOSINOPHIL # BLD AUTO: 0.07 K/UL (ref 0–0.51)
EOSINOPHIL NFR BLD: 1.7 % (ref 0–6.9)
ERYTHROCYTE [DISTWIDTH] IN BLOOD BY AUTOMATED COUNT: 42.8 FL (ref 35.9–50)
GLOBULIN SER CALC-MCNC: 2.6 G/DL (ref 1.9–3.5)
GLUCOSE SERPL-MCNC: 185 MG/DL (ref 65–99)
HCT VFR BLD AUTO: 39 % (ref 37–47)
HGB BLD-MCNC: 12.3 G/DL (ref 12–16)
IMM GRANULOCYTES # BLD AUTO: 0.01 K/UL (ref 0–0.11)
IMM GRANULOCYTES NFR BLD AUTO: 0.2 % (ref 0–0.9)
LYMPHOCYTES # BLD AUTO: 1.28 K/UL (ref 1–4.8)
LYMPHOCYTES NFR BLD: 30.6 % (ref 22–41)
MCH RBC QN AUTO: 27.2 PG (ref 27–33)
MCHC RBC AUTO-ENTMCNC: 31.5 G/DL (ref 33.6–35)
MCV RBC AUTO: 86.3 FL (ref 81.4–97.8)
MONOCYTES # BLD AUTO: 0.24 K/UL (ref 0–0.85)
MONOCYTES NFR BLD AUTO: 5.7 % (ref 0–13.4)
NEUTROPHILS # BLD AUTO: 2.55 K/UL (ref 2–7.15)
NEUTROPHILS NFR BLD: 61.1 % (ref 44–72)
NRBC # BLD AUTO: 0 K/UL
NRBC BLD-RTO: 0 /100 WBC
PLATELET # BLD AUTO: 104 K/UL (ref 164–446)
PMV BLD AUTO: 10.6 FL (ref 9–12.9)
POTASSIUM SERPL-SCNC: 4.4 MMOL/L (ref 3.6–5.5)
PROT SERPL-MCNC: 7.2 G/DL (ref 6–8.2)
RBC # BLD AUTO: 4.52 M/UL (ref 4.2–5.4)
SODIUM SERPL-SCNC: 139 MMOL/L (ref 135–145)
WBC # BLD AUTO: 4.2 K/UL (ref 4.8–10.8)

## 2020-02-05 PROCEDURE — 85025 COMPLETE CBC W/AUTO DIFF WBC: CPT

## 2020-02-05 PROCEDURE — 99204 OFFICE O/P NEW MOD 45 MIN: CPT | Performed by: INTERNAL MEDICINE

## 2020-02-05 PROCEDURE — G0475 HIV COMBINATION ASSAY: HCPCS | Mod: GA

## 2020-02-05 PROCEDURE — 80074 ACUTE HEPATITIS PANEL: CPT | Mod: GA

## 2020-02-05 PROCEDURE — 36415 COLL VENOUS BLD VENIPUNCTURE: CPT | Mod: GA

## 2020-02-05 PROCEDURE — 80053 COMPREHEN METABOLIC PANEL: CPT

## 2020-02-05 RX ORDER — OMEPRAZOLE 20 MG/1
CAPSULE, DELAYED RELEASE ORAL
COMMUNITY
Start: 2020-01-09

## 2020-02-05 RX ORDER — ALLOPURINOL 100 MG/1
TABLET ORAL
COMMUNITY
Start: 2020-01-27

## 2020-02-05 RX ORDER — FUROSEMIDE 20 MG/1
TABLET ORAL
COMMUNITY
Start: 2020-01-28

## 2020-02-05 RX ORDER — HYDROCODONE BITARTRATE AND ACETAMINOPHEN 7.5; 325 MG/1; MG/1
TABLET ORAL
Refills: 0 | COMMUNITY
Start: 2019-11-18

## 2020-02-05 RX ORDER — ATORVASTATIN CALCIUM 40 MG/1
TABLET, FILM COATED ORAL
COMMUNITY
Start: 2020-02-02

## 2020-02-05 ASSESSMENT — PAIN SCALES - GENERAL: PAINLEVEL: 4=SLIGHT-MODERATE PAIN
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
## 2020-02-06 LAB
HAV IGM SERPL QL IA: NEGATIVE
HBV CORE IGM SER QL: NEGATIVE
HBV SURFACE AG SER QL: NEGATIVE
HCV AB SER QL: NEGATIVE
HIV 1+2 AB+HIV1 P24 AG SERPL QL IA: NON REACTIVE

## 2020-02-07 ENCOUNTER — TELEPHONE (OUTPATIENT)
Dept: HEMATOLOGY ONCOLOGY | Facility: MEDICAL CENTER | Age: 70
End: 2020-02-07

## 2020-02-08 NOTE — TELEPHONE ENCOUNTER
Rec'vd a transferred call from Jaja DC. Pt called with concerns about recent lab results.  Pt states she has not been able to sleep at night because she's worried about her Hepatitis & HIV results.  Pt states she only needs to know those 2 results right now in order to feel less anxious, all other lab results can wait until next office visit.    Spoke with Dr. Elias regarding above concerns & was given permission to relay those results with pt.    Called pt back & gave her the results she requested (Hepatits panel negative & HIV non-reactive).  Pt verbalized understanding, stated relief & appreciated call.  Pt agreeable with reviewing remaining results with provider at next office visit.

## 2020-02-26 ENCOUNTER — HOSPITAL ENCOUNTER (OUTPATIENT)
Dept: LAB | Facility: MEDICAL CENTER | Age: 70
End: 2020-02-26
Attending: OPHTHALMOLOGY
Payer: MEDICARE

## 2020-02-26 LAB
CRP SERPL HS-MCNC: 0.05 MG/DL (ref 0–0.75)
ERYTHROCYTE [SEDIMENTATION RATE] IN BLOOD BY WESTERGREN METHOD: 2 MM/HOUR (ref 0–30)

## 2020-02-26 PROCEDURE — 36415 COLL VENOUS BLD VENIPUNCTURE: CPT

## 2020-02-26 PROCEDURE — 85652 RBC SED RATE AUTOMATED: CPT

## 2020-02-26 PROCEDURE — 86140 C-REACTIVE PROTEIN: CPT

## 2020-03-04 ENCOUNTER — OFFICE VISIT (OUTPATIENT)
Dept: HEMATOLOGY ONCOLOGY | Facility: MEDICAL CENTER | Age: 70
End: 2020-03-04
Payer: MEDICARE

## 2020-03-04 VITALS
WEIGHT: 181.44 LBS | TEMPERATURE: 98.4 F | HEIGHT: 65 IN | DIASTOLIC BLOOD PRESSURE: 70 MMHG | SYSTOLIC BLOOD PRESSURE: 120 MMHG | RESPIRATION RATE: 16 BRPM | OXYGEN SATURATION: 95 % | BODY MASS INDEX: 30.23 KG/M2 | HEART RATE: 88 BPM

## 2020-03-04 DIAGNOSIS — D69.6 THROMBOCYTOPENIA (HCC): ICD-10-CM

## 2020-03-04 PROCEDURE — 99213 OFFICE O/P EST LOW 20 MIN: CPT | Performed by: INTERNAL MEDICINE

## 2020-03-04 ASSESSMENT — FIBROSIS 4 INDEX: FIB4 SCORE: 2.77
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
## 2020-03-19 DIAGNOSIS — S06.9X1A TRAUMATIC BRAIN INJURY, WITH LOSS OF CONSCIOUSNESS OF 30 MINUTES OR LESS, INITIAL ENCOUNTER (HCC): ICD-10-CM

## 2020-03-26 ENCOUNTER — APPOINTMENT (OUTPATIENT)
Dept: PHYSICAL MEDICINE AND REHAB | Facility: REHABILITATION | Age: 70
End: 2020-03-26
Payer: MEDICARE

## 2020-06-24 ENCOUNTER — APPOINTMENT (OUTPATIENT)
Dept: HEMATOLOGY ONCOLOGY | Facility: MEDICAL CENTER | Age: 70
End: 2020-06-24
Payer: MEDICARE

## 2020-06-29 PROBLEM — R16.2 HEPATOMEGALY WITH SPLENOMEGALY, NOT ELSEWHERE CLASSIFIED: Status: ACTIVE | Noted: 2020-06-29

## 2020-06-29 PROBLEM — D61.818 PANCYTOPENIA (HCC): Status: ACTIVE | Noted: 2020-06-29

## 2020-06-29 NOTE — PROGRESS NOTES
Telephone Appointment Visit   As a means of avoiding spread of COVID-19, this visit is being conducted by telephone. This telephone visit was initiated by the patient and they verbally consented.    Time at start of call: 10:58    Reason for Call:  Lab Follow-up    HPI:    69 female initially seen by Dr. Elias. Work-up revealed US consistent with cirrchosis and marked splenomegaly with an 18 cm spleen. Recent lab from [facility] shows a WBC of 3.7, H/H 11.2/35 and platelet count of 88K.     Labs / Images Reviewed:   See above. Of note back in February the Calcium was elevated at 11.    Assessment and Plan:     1. Pancytopenia (HCC)    2. Hepatomegaly with splenomegaly, not elsewhere classified      Follow-up: We do not need to see again but her primary MD should repeat her Calcium (staff message sent). Hold Vit D until Calcium checked  RT prn    Time at end of call: 11:10  Total Time Spent: 11-20 minutes    Andrey Jennings M.D.

## 2020-06-30 ENCOUNTER — OFFICE VISIT (OUTPATIENT)
Dept: HEMATOLOGY ONCOLOGY | Facility: MEDICAL CENTER | Age: 70
End: 2020-06-30
Payer: MEDICARE

## 2020-06-30 DIAGNOSIS — D61.818 PANCYTOPENIA (HCC): ICD-10-CM

## 2020-06-30 DIAGNOSIS — R16.2 HEPATOMEGALY WITH SPLENOMEGALY, NOT ELSEWHERE CLASSIFIED: ICD-10-CM

## 2020-06-30 PROCEDURE — 99213 OFFICE O/P EST LOW 20 MIN: CPT | Performed by: INTERNAL MEDICINE

## 2020-06-30 RX ORDER — FLUOXETINE HYDROCHLORIDE 20 MG/1
20 CAPSULE ORAL
COMMUNITY
Start: 2020-06-04

## 2020-06-30 RX ORDER — GLIMEPIRIDE 1 MG/1
TABLET ORAL
COMMUNITY
Start: 2020-06-24

## 2020-11-24 ENCOUNTER — HOSPITAL ENCOUNTER (OUTPATIENT)
Facility: MEDICAL CENTER | Age: 70
End: 2020-11-24
Attending: FAMILY MEDICINE
Payer: MEDICARE

## 2020-11-24 ENCOUNTER — OFFICE VISIT (OUTPATIENT)
Dept: URGENT CARE | Facility: PHYSICIAN GROUP | Age: 70
End: 2020-11-24
Payer: MEDICARE

## 2020-11-24 VITALS
SYSTOLIC BLOOD PRESSURE: 122 MMHG | HEART RATE: 74 BPM | RESPIRATION RATE: 16 BRPM | DIASTOLIC BLOOD PRESSURE: 74 MMHG | OXYGEN SATURATION: 97 % | BODY MASS INDEX: 29.85 KG/M2 | TEMPERATURE: 98.9 F | HEIGHT: 65 IN | WEIGHT: 179.2 LBS

## 2020-11-24 DIAGNOSIS — J02.0 STREP THROAT: ICD-10-CM

## 2020-11-24 DIAGNOSIS — R53.83 OTHER FATIGUE: ICD-10-CM

## 2020-11-24 DIAGNOSIS — J34.89 RHINORRHEA: ICD-10-CM

## 2020-11-24 LAB
FLUAV+FLUBV AG SPEC QL IA: NORMAL
INT CON NEG: NEGATIVE
INT CON NEG: NEGATIVE
INT CON POS: POSITIVE
INT CON POS: POSITIVE
S PYO AG THROAT QL: POSITIVE

## 2020-11-24 PROCEDURE — 99214 OFFICE O/P EST MOD 30 MIN: CPT | Performed by: FAMILY MEDICINE

## 2020-11-24 PROCEDURE — 87804 INFLUENZA ASSAY W/OPTIC: CPT | Performed by: FAMILY MEDICINE

## 2020-11-24 PROCEDURE — U0003 INFECTIOUS AGENT DETECTION BY NUCLEIC ACID (DNA OR RNA); SEVERE ACUTE RESPIRATORY SYNDROME CORONAVIRUS 2 (SARS-COV-2) (CORONAVIRUS DISEASE [COVID-19]), AMPLIFIED PROBE TECHNIQUE, MAKING USE OF HIGH THROUGHPUT TECHNOLOGIES AS DESCRIBED BY CMS-2020-01-R: HCPCS

## 2020-11-24 PROCEDURE — 87880 STREP A ASSAY W/OPTIC: CPT | Performed by: FAMILY MEDICINE

## 2020-11-24 RX ORDER — AMOXICILLIN 875 MG/1
875 TABLET, COATED ORAL 2 TIMES DAILY
Qty: 20 TAB | Refills: 0 | Status: SHIPPED | OUTPATIENT
Start: 2020-11-24 | End: 2020-12-04

## 2020-11-24 ASSESSMENT — FIBROSIS 4 INDEX: FIB4 SCORE: 2.81

## 2020-11-25 DIAGNOSIS — J34.89 RHINORRHEA: ICD-10-CM

## 2020-11-25 DIAGNOSIS — R53.83 OTHER FATIGUE: ICD-10-CM

## 2020-11-25 LAB — COVID ORDER STATUS COVID19: NORMAL

## 2020-11-26 LAB
SARS-COV-2 RNA RESP QL NAA+PROBE: NOTDETECTED
SPECIMEN SOURCE: NORMAL

## 2021-03-07 NOTE — CARE PLAN
Problem: Communication  Goal: The ability to communicate needs accurately and effectively will improve  Outcome: PROGRESSING AS EXPECTED  Alert and oriented x 4.       Problem: Safety  Goal: Will remain free from injury  Outcome: PROGRESSING AS EXPECTED  Pt uses call light consistently and appropriately. Waits for assistance does not attempt self transfer this shift. Able to verbalize needs.       DIONNE

## 2021-12-13 NOTE — CARE PLAN
Problem: Pain Management  Goal: Pain level will decrease to patient's comfort goal  Outcome: PROGRESSING AS EXPECTED  No prn pain med today.      cloNIDine (CATAPRES) 0.1 MG tablet 180 tablet 0 12/13/2021     Sig - Route: Take 1 tablet by mouth nightly. Dose: 2 tablets (=0.2mg)      Dr. Barros, Please clarify dosage.

## 2022-12-18 ENCOUNTER — HOSPITAL ENCOUNTER (EMERGENCY)
Facility: MEDICAL CENTER | Age: 72
End: 2022-12-18
Attending: EMERGENCY MEDICINE
Payer: MEDICARE

## 2022-12-18 ENCOUNTER — APPOINTMENT (OUTPATIENT)
Dept: RADIOLOGY | Facility: MEDICAL CENTER | Age: 72
End: 2022-12-18
Attending: EMERGENCY MEDICINE
Payer: MEDICARE

## 2022-12-18 VITALS
OXYGEN SATURATION: 97 % | BODY MASS INDEX: 31.76 KG/M2 | HEIGHT: 64 IN | TEMPERATURE: 97.5 F | WEIGHT: 186 LBS | SYSTOLIC BLOOD PRESSURE: 139 MMHG | DIASTOLIC BLOOD PRESSURE: 81 MMHG | RESPIRATION RATE: 16 BRPM | HEART RATE: 75 BPM

## 2022-12-18 DIAGNOSIS — S09.90XA CLOSED HEAD INJURY, INITIAL ENCOUNTER: ICD-10-CM
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
## 2022-12-18 DIAGNOSIS — S60.032A CONTUSION OF LEFT MIDDLE FINGER WITHOUT DAMAGE TO NAIL, INITIAL ENCOUNTER: ICD-10-CM

## 2022-12-18 DIAGNOSIS — S01.01XA LACERATION OF SCALP, INITIAL ENCOUNTER: ICD-10-CM

## 2022-12-18 DIAGNOSIS — S13.9XXA NECK SPRAIN, INITIAL ENCOUNTER: ICD-10-CM

## 2022-12-18 PROCEDURE — 700102 HCHG RX REV CODE 250 W/ 637 OVERRIDE(OP): Performed by: EMERGENCY MEDICINE

## 2022-12-18 PROCEDURE — 305308 HCHG STAPLER,SKIN,DISP.

## 2022-12-18 PROCEDURE — 304999 HCHG REPAIR-SIMPLE/INTERMED LEVEL 1

## 2022-12-18 PROCEDURE — 700101 HCHG RX REV CODE 250: Performed by: EMERGENCY MEDICINE

## 2022-12-18 PROCEDURE — 73140 X-RAY EXAM OF FINGER(S): CPT | Mod: LT

## 2022-12-18 PROCEDURE — 304217 HCHG IRRIGATION SYSTEM

## 2022-12-18 PROCEDURE — 70450 CT HEAD/BRAIN W/O DYE: CPT

## 2022-12-18 PROCEDURE — 99285 EMERGENCY DEPT VISIT HI MDM: CPT

## 2022-12-18 PROCEDURE — 72125 CT NECK SPINE W/O DYE: CPT

## 2022-12-18 PROCEDURE — A9270 NON-COVERED ITEM OR SERVICE: HCPCS | Performed by: EMERGENCY MEDICINE

## 2022-12-18 RX ORDER — HYDROCODONE BITARTRATE AND ACETAMINOPHEN 5; 325 MG/1; MG/1
2 TABLET ORAL ONCE
Status: COMPLETED | OUTPATIENT
Start: 2022-12-18 | End: 2022-12-18

## 2022-12-18 RX ADMIN — LIDOCAINE HYDROCHLORIDE 10 ML: 10; .005 INJECTION, SOLUTION EPIDURAL; INFILTRATION; INTRACAUDAL; PERINEURAL at 11:21

## 2022-12-18 RX ADMIN — HYDROCODONE BITARTRATE AND ACETAMINOPHEN 2 TABLET: 5; 325 TABLET ORAL at 10:58

## 2022-12-18 ASSESSMENT — PAIN DESCRIPTION - PAIN TYPE: TYPE: ACUTE PAIN

## 2022-12-18 NOTE — ED NOTES
Pt to B17 from CT. Pt is A&Ox4 and awake in bed. Pt placed on pulse ox and automatic BP. VSS, saturating above 95% on RA, HR in the 70s. Call light within reach.

## 2022-12-18 NOTE — DISCHARGE INSTRUCTIONS
See your doctor for recheck if not better in 1 week.  Staples need to be removed in 1 week.  Return for any concerns

## 2022-12-18 NOTE — ED TRIAGE NOTES
.  Chief Complaint   Patient presents with    T-5000 FALL    Head Laceration    Neck Pain     [place]iba from home. Slip and fall on ice in driveway. Ambulatory post injury. C/o neck and back pain. Right fa skin tear, left 3rd finger contusion. Right head laceration bleeding controlled. A&O x 4 denies loc, no blood thinners.   Pt to imaging by grady
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
## 2022-12-18 NOTE — ED PROVIDER NOTES
"ED Provider Note    CHIEF COMPLAINT  Chief Complaint   Patient presents with    T-5000 FALL    Head Laceration    Neck Pain       HPI  Olga Lockhart is a 72 y.o. female who presents after head injury.  She slipped on ice on her driveway striking her head on the ground, suffering laceration.  She has associated neck pain from the fall.  Chronic back pain, states this is unchanged.  No acute numbness weakness of extremities.  She also complains of left finger pain, striking her hand on the ground.  No vision change, no acute numbness or weakness.  She denies taking blood thinners.  Patient has posttraumatic headache and neck pain.  Given age and mechanism, patient arrives as TBI protocol alert.  No associated chest or abdominal pain, no nausea or vomiting.    REVIEW OF SYSTEMS  Ear nose throat: No facial pain  Respiratory: No shortness of breath or pleurisy  Gastrointestinal: Nausea or vomiting, no abdominal pain  Musculoskeletal: Neck pain.  Chronic low back pain  Neurologic: Posttraumatic headache  Skin: Scalp laceration     All other systems are negative.       PAST MEDICAL HISTORY  Past Medical History:   Diagnosis Date    Arthritis     lower back    Diabetes (HCC) 2/2017    oral medication    Heart burn     \"controlled\"    High cholesterol     Hypertension     Pneumonia 1984    Psychiatric problem     depression    Renal disorder 2/2017    \"only one functioning kidney\"    Renal disorder     multiple kidney stones       FAMILY HISTORY  No family history on file.    SOCIAL HISTORY  Social History     Socioeconomic History    Marital status:    Tobacco Use    Smoking status: Never    Smokeless tobacco: Never   Vaping Use    Vaping Use: Never used   Substance and Sexual Activity    Alcohol use: No    Drug use: No       SURGICAL HISTORY  Past Surgical History:   Procedure Laterality Date    LUMBAR FUSION O-ARM  3/8/2017    Procedure: LUMBAR FUSION O-ARM L4-5 INSTRUMENTED;  Surgeon: Yamilet Wild M.D.;  " Location: SURGERY [facility];  Service:     LUMBAR LAMINECTOMY DISKECTOMY  3/8/2017    Procedure: LUMBAR L3-S1 DECOMPRESSIVE LAMINECTOMY RT L5-S1 MICRODISKECTOMY IF NEEDED;  Surgeon: Yamilet Wild M.D.;  Location: SURGERY [facility];  Service:     FORAMINOTOMY  3/8/2017    Procedure: FORAMINOTOMY;  Surgeon: Yamilet Wild M.D.;  Location: SURGERY [facility];  Service:     HYSTERECTOMY LAPAROSCOPY  2015    OTHER  2014    sepsis, lithotripsy       CURRENT MEDICATIONS  No current facility-administered medications on file prior to encounter.     Current Outpatient Medications on File Prior to Encounter   Medication Sig Dispense Refill    glimepiride (AMARYL) 1 MG tablet TAKE 1 TABLET BY MOUTH ONCE DAILY IN THE MORNING IF SUGARS ARE DROPPING TO LESS THAN 80 STOP TAKING      FLUoxetine (PROZAC) 20 MG Cap Take 20 mg by mouth.      omeprazole (PRILOSEC) 20 MG delayed-release capsule       metformin (GLUCOPHAGE) 1000 MG tablet       HYDROcodone-acetaminophen (NORCO) 7.5-325 MG per tablet TAKE 1 TABLET BY MOUTH TWICE DAILY AS NEEDED FOR 30 DAYS  0    furosemide (LASIX) 20 MG Tab       atorvastatin (LIPITOR) 40 MG Tab       allopurinol (ZYLOPRIM) 100 MG Tab       fluoxetine (PROZAC) 20 MG tablet Take 0.5 Tabs by mouth every day. 30 Tab 3    lisinopril (PRINIVIL) 10 MG Tab Take 1 Tab by mouth every day. 30 Tab 3    metFORMIN (GLUCOPHAGE) 500 MG Tab Take 1 Tab by mouth 2 times a day, with meals. (Patient not taking: Reported on 6/30/2020) 60 Tab 3    cyclobenzaprine (FLEXERIL) 5 MG tablet Take 1 Tab by mouth 3 times a day as needed for Muscle Spasms. (Patient not taking: Reported on 3/4/2020) 30 Tab 0    lidocaine (LIDODERM) 5 % Patch Apply 2 Patches to skin as directed every 24 hours. (Patient not taking: Reported on 3/4/2020) 30 Patch 3    traZODone (DESYREL) 50 MG Tab Take 1 Tab by mouth at bedtime as needed. 30 Tab 3    atorvastatin (LIPITOR) 20 MG Tab Take 20 mg by mouth every day.         ALLERGIES  No  "Known Allergies    PHYSICAL EXAM  VITAL SIGNS: Temp 36.5 °C (97.7 °F) (Temporal)   Ht 1.626 m (5' 4\")   Wt 84.4 kg (186 lb)   LMP  (LMP Unknown)   BMI 31.93 kg/m²    Constitutional: Well-nourished, no distress  HENT: Right frontal scalp laceration starting at the right upper hairline extending posteriorly for approximately 5 cm.  No exposed galea, no depressed skull fracture, no facial bony tenderness.  Eyes: Pupils are equal 3 millimeters, Conjunctiva normal, No discharge.   Neck: Midline tenderness, cervical collar in place  Cardiovascular: Normal heart rate, Normal rhythm   Pulmonary: Equal  breath sounds, No wheezing or rales.  Good bilateral air movement  GI: Soft and nontender, no guarding  Skin: Scalp laceration as noted above.  Abrasion right forearm  Vascular: Normal capillary refill all extremities  Musculoskeletal: Thoracic and lumbar spine nontender, ribs and pelvis nontender, extremities nontender  Neurologic: Station normal, strength normal, speech clear    RADIOLOGY/PROCEDURES  DX-FINGER(S) 2+ LEFT   Final Result      No fracture or dislocation. No radiographic findings of osteomyelitis.      CT-HEAD W/O   Final Result      1. No CT evidence of acute infarct, hemorrhage or mass.   2. Mild global parenchymal atrophy. Chronic small vessel ischemic changes.      CT-CSPINE WITHOUT PLUS RECONS   Final Result      No acute fracture or traumatic listhesis in the cervical spine.        Laceration Repair Procedure Note    Indication: Laceration    Procedure: The patient was placed in the appropriate position and anesthesia around the laceration was obtained by infiltration using 1% Lidocaine with epinephrine. The area was then cleansed using betadine.  High-pressure irrigated with saline.  The laceration was closed with staples. There were no additional lacerations requiring repair. The wound area was then dressed with bacitracin.      Total repaired wound length: 5 cm.     Other Items: Staple count: " 3    The patient tolerated the procedure well.    Complications: None          COURSE & MEDICAL DECISION MAKING  Pertinent Labs & Imaging studies reviewed. (See chart for details)  Patient with scalp laceration repaired with staples.  Given mechanism of injury, physical findings, CT scan of the head was obtained to rule out fracture or injury intracranial, this was a negative study.  CT scan of the spine was negative for acute fracture dislocation.  Patient had bruised left fingers, x-ray negative for acute fracture.  She was provided a finger splint at her request to help protect the area of bruising.  Patient is advised to follow-up with her doctor for recheck if not better in 1 week, she is advised to have staples removed in 1 week.  Patient was well-appearing upon discharge    FINAL IMPRESSION     1. Fall, initial encounter        2. Neck sprain, initial encounter        3. Laceration of scalp, initial encounter        4. Closed head injury, initial encounter        5. Contusion of left middle finger without damage to nail, initial encounter                  Electronically signed by: Diego Huggins M.D., 12/18/2022

## 2022-12-18 NOTE — ED NOTES
Pt complaining of finger pain on L hand third finger and requesting splint. OK with ERP and split applied by ED tech. Pt states she did not take her AM HTN medications today.
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
## 2023-12-28 NOTE — PROGRESS NOTES
"Cc:   Fatigue, sore throat, postnasal drip      HPI:      Pt c/o dry cough, postnasal drip, fatigue and sore throat x 4 d.    Also had fever, Tmax 102 on 11/22.   PND not improved with otc antihistamine.       Past Medical History:   Diagnosis Date   • Arthritis     lower back   • Diabetes (HCC) 2/2017    oral medication   • Heart burn     \"controlled\"   • High cholesterol    • Hypertension    • Pneumonia 1984   • Psychiatric problem     depression   • Renal disorder 2/2017    \"only one functioning kidney\"   • Renal disorder     multiple kidney stones     Social History     Tobacco Use   • Smoking status: Never Smoker   • Smokeless tobacco: Never Used   Substance Use Topics   • Alcohol use: No   • Drug use: No     Family history was reviewed and not pertinent           Review of Systems   Constitutional: Negative for fever, chills and malaise/fatigue.   Eyes: Negative for vision changes, d/c.    Respiratory: Negative for cough and sputum production.    Cardiovascular: Negative for chest pain and palpitations.   Gastrointestinal: Negative for nausea, vomiting, abdominal pain, diarrhea and constipation.   Genitourinary: Negative for dysuria, urgency and frequency.   Skin: Negative for rash or  itching.   Neurological: Negative for dizziness and tingling.   Psychiatric/Behavioral: Negative for depression.   Hematologic/lymphatic - denies bruising or excessive bleeding  All other systems reviewed and are negative.      OBJECTIVE  /74 (BP Location: Left arm, Patient Position: Sitting, BP Cuff Size: Adult)   Pulse 74   Temp 37.2 °C (98.9 °F) (Temporal)   Resp 16   Ht 1.651 m (5' 5\")   Wt 81.3 kg (179 lb 3.2 oz)   SpO2 97%   HEENT - PERRLA, EOMI  Posterior pharynx is erythematous, but no exudates  Nose: + boggy mucosa.     Neuro - alert and oriented x3. CN 2-12 grossly intact.  Lungs - CTA. No wheezes, rhonchi or rales.  Heart - regular rate and rhythm without murmur.  Abdomen - soft and non-tender, bowel " sounds active x4.  Musculoskeletal - No lower extremity edema noted.  Vini's sign negative bilaterally      A/P:    1.  Strep throat  Rapid strep positive.   - amoxicillin (AMOXIL) 875 MG tablet; Take 1 Tab by mouth 2 times a day for 10 days.  Dispense: 20 Tab; Refill: 0     COVID screen sent  Home isolation for now  Will call with results.     Follow up in one week if no improvement, sooner if symptoms worsen.         .   immune

## (undated) DEVICE — SYRINGE SAFETY 10 ML 18 GA X 1 1/2 BLUNT LL (100/BX 4BX/CA)

## (undated) DEVICE — TUBE E-T HI-LO CUFF 7.5MM (10EA/PK)

## (undated) DEVICE — TUBING CLEARLINK DUO-VENT - C-FLO (48EA/CA)

## (undated) DEVICE — KIT ROOM DECONTAMINATION

## (undated) DEVICE — DEVICE MONOPOLAR RF PEAK PLASMABLADE 3.0S

## (undated) DEVICE — MIDAS LUBRICATOR DIFFUSER PACK (4EA/CA)

## (undated) DEVICE — PACK NEURO - (2EA/CA)

## (undated) DEVICE — SET EXTENSION WITH 2 PORTS (48EA/CA) ***PART #2C8610 IS A SUBSTITUTE*****

## (undated) DEVICE — SODIUM CHL IRRIGATION 0.9% 1000ML (12EA/CA)

## (undated) DEVICE — SUTURE 1 VICRYL PLUS CT-1 - 18 INCH (12/BX)

## (undated) DEVICE — MASK ANESTHESIA ADULT  - (100/CA)

## (undated) DEVICE — SUTURE 2-0 VICRYL PLUS CT-1 - 8 X 18 INCH(12/BX)

## (undated) DEVICE — GLOVE BIOGEL ECLIPSE  PF LATEX SIZE 6.5 (50PR/BX)

## (undated) DEVICE — SET LEADWIRE 5 LEAD BEDSIDE DISPOSABLE ECG (1SET OF 5/EA)

## (undated) DEVICE — CHLORAPREP 26 ML APPLICATOR - ORANGE TINT(25/CA)

## (undated) DEVICE — DRAPE MAYO STAND - (30/CA)

## (undated) DEVICE — DERMABOND ADVANCED - (12EA/BX)

## (undated) DEVICE — DRAPE LAPAROTOMY T SHEET - (12EA/CA)

## (undated) DEVICE — GLOVE BIOGEL INDICATOR SZ 8 SURGICAL PF LTX - (50/BX 4BX/CA)

## (undated) DEVICE — DRAPE LARGE 3 QUARTER - (20/CA)

## (undated) DEVICE — TRAY CATHETER FOLEY URINE METER W/STATLOCK 350ML (10EA/CA)

## (undated) DEVICE — TOOL DISSECT MATCH HEAD

## (undated) DEVICE — ADHESIVE DERMABOND HVD MINI (12EA/BX)

## (undated) DEVICE — CANISTER SUCTION 3000ML MECHANICAL FILTER AUTO SHUTOFF MEDI-VAC NONSTERILE LF DISP  (40EA/CA)

## (undated) DEVICE — BONE MILL BM210

## (undated) DEVICE — DRAPE STRLE REG TOWEL 18X24 - (10/BX 4BX/CA)"

## (undated) DEVICE — PUMP ON-Q 270 DUAL 2ML FIXED RATE (5EA/CA)

## (undated) DEVICE — SEALER BIPOLAR 2.3 AQUAMANTYS

## (undated) DEVICE — GOWN WARMING STANDARD FLEX - (30/CA)

## (undated) DEVICE — SPONGE GAUZESTER 4 X 4 4PLY - (128PK/CA)

## (undated) DEVICE — DRESSING XEROFORM 1X8 - (50/BX 4BX/CA)

## (undated) DEVICE — DRAPE SURG STERI-DRAPE 7X11OD - (40EA/CA)

## (undated) DEVICE — PROTECTOR ULNA NERVE - (36PR/CA)

## (undated) DEVICE — TUBING C&T SET FLYING LEADS DRAIN TUBING (10EA/BX)

## (undated) DEVICE — ELECTRODE 850 FOAM ADHESIVE - HYDROGEL RADIOTRNSPRNT (50/PK)

## (undated) DEVICE — NEPTUNE 4 PORT MANIFOLD - (20/PK)

## (undated) DEVICE — INTRAOP NEURO IN OR 1:1 PER 15 MIN

## (undated) DEVICE — CONTROLLER  STERILE O ARM (10EA/PK)

## (undated) DEVICE — SUTURE GENERAL

## (undated) DEVICE — CATHETER ON-Q SILVER SOAKER 5IN  (5EA/CA)  - SUB ORDER #4428

## (undated) DEVICE — SODIUM CHL. INJ. 0.9% 500ML (24EA/CA 50CA/PF)

## (undated) DEVICE — SUCTION INSTRUMENT YANKAUER BULBOUS TIP W/O VENT (50EA/CA)

## (undated) DEVICE — GLOVE BIOGEL SZ 8 SURGICAL PF LTX - (50PR/BX 4BX/CA)

## (undated) DEVICE — SPHERE NAVIGATION STEALTH (5EA/TY 12TY/PK)

## (undated) DEVICE — SLEEVE, VASO, THIGH, MED

## (undated) DEVICE — ARMREST CRADLE FOAM - (2PR/PK 12PR/CA)

## (undated) DEVICE — LEAD SET 6 DISP. EKG NIHON KOHDEN (100EA/CA)

## (undated) DEVICE — ELECTRODE DUAL RETURN W/ CORD - (50/PK)

## (undated) DEVICE — PIN PERCUTANEOUS STERILE 150MM

## (undated) DEVICE — KIT EVACUATER 3 SPRING PVC LF 1/8 DRAIN SIZE (10EA/CA)"

## (undated) DEVICE — LACTATED RINGERS INJ 1000 ML - (14EA/CA 60CA/PF)

## (undated) DEVICE — KIT SURGIFLO W/OUT THROMBIN - (6EA/CA)

## (undated) DEVICE — GLOVE BIOGEL PI INDICATOR SZ 7.0 SURGICAL PF LF - (50/BX 4BX/CA)

## (undated) DEVICE — HEADREST PRONEVIEW LARGE - (10/CA)

## (undated) DEVICE — LACTATED RINGERS INJ. 500 ML - (24EA/CA)

## (undated) DEVICE — SENSOR SPO2 NEO LNCS ADHESIVE (20/BX) SEE USER NOTES

## (undated) DEVICE — KIT ANESTHESIA W/CIRCUIT & 3/LT BAG W/FILTER (20EA/CA)